# Patient Record
Sex: MALE | Race: WHITE | NOT HISPANIC OR LATINO | Employment: OTHER | URBAN - METROPOLITAN AREA
[De-identification: names, ages, dates, MRNs, and addresses within clinical notes are randomized per-mention and may not be internally consistent; named-entity substitution may affect disease eponyms.]

---

## 2020-06-11 ENCOUNTER — TRANSCRIBE ORDERS (OUTPATIENT)
Dept: ADMINISTRATIVE | Facility: HOSPITAL | Age: 85
End: 2020-06-11

## 2020-06-11 DIAGNOSIS — R29.818 TRANSIENT PARALYSIS OF LIMB: ICD-10-CM

## 2020-06-11 DIAGNOSIS — R26.9 ABNORMALITY OF GAIT: Primary | ICD-10-CM

## 2020-06-25 ENCOUNTER — HOSPITAL ENCOUNTER (OUTPATIENT)
Dept: MRI IMAGING | Facility: CLINIC | Age: 85
Discharge: HOME | End: 2020-06-25
Payer: COMMERCIAL

## 2020-06-25 DIAGNOSIS — R29.818 TRANSIENT PARALYSIS OF LIMB: ICD-10-CM

## 2020-06-25 DIAGNOSIS — R26.9 ABNORMALITY OF GAIT: ICD-10-CM

## 2020-06-25 PROCEDURE — 72148 MRI LUMBAR SPINE W/O DYE: CPT

## 2021-03-04 ENCOUNTER — TELEPHONE (OUTPATIENT)
Dept: UROLOGY | Facility: AMBULATORY SURGERY CENTER | Age: 86
End: 2021-03-04

## 2021-03-04 NOTE — TELEPHONE ENCOUNTER
Reason for appointment/Complaint/Diagnosis : gross hematuria      Insurance:[employer] medicare    History of Cancer? no         If yes, what kind?n/a    Previous urologist?  no              Records requested/where? Care everywhere  Outside testing/where? [facility]  Location Preference for office visit?  [place]

## 2021-03-04 NOTE — TELEPHONE ENCOUNTER
Call placed to patient, spoke with wife (patient in background on speaker phone)  Patient had one instance of light blood in urine  This subsided  UA was recently done by PCP and showed no evidence of hematuria  Patient has no urinary complaints at this time  Wife states that PCP did order CT scan as well  They declined to schedule at this time  They state that they will follow up with PCP first and if there is any reason to see urologist, they will call back to schedule

## 2021-03-11 NOTE — TELEPHONE ENCOUNTER
Florencio Schmitt from PCP Dr Zak Galvan office called in stating patient changed mind to schedule with a urologist  Florencio Schmitt stated she faxed over records to central scanning   Patient can be reached at [phone]

## 2021-03-11 NOTE — TELEPHONE ENCOUNTER
Patient scheduled for 3/24/21 at 230pm with [facility]QUE[facility]  Please confirm

## 2021-03-24 ENCOUNTER — CONSULT (OUTPATIENT)
Dept: UROLOGY | Facility: CLINIC | Age: 86
End: 2021-03-24
Payer: COMMERCIAL

## 2021-03-24 VITALS
HEIGHT: 72 IN | HEART RATE: 84 BPM | WEIGHT: 200 LBS | BODY MASS INDEX: 27.09 KG/M2 | SYSTOLIC BLOOD PRESSURE: 108 MMHG | DIASTOLIC BLOOD PRESSURE: 62 MMHG

## 2021-03-24 DIAGNOSIS — R31.0 GROSS HEMATURIA: Primary | ICD-10-CM

## 2021-03-24 DIAGNOSIS — N40.1 BENIGN PROSTATIC HYPERPLASIA WITH URINARY FREQUENCY: ICD-10-CM

## 2021-03-24 DIAGNOSIS — Z12.5 PROSTATE CANCER SCREENING: ICD-10-CM

## 2021-03-24 DIAGNOSIS — R35.0 BENIGN PROSTATIC HYPERPLASIA WITH URINARY FREQUENCY: ICD-10-CM

## 2021-03-24 LAB
SL AMB  POCT GLUCOSE, UA: NORMAL
SL AMB LEUKOCYTE ESTERASE,UA: NORMAL
SL AMB POCT BILIRUBIN,UA: NORMAL
SL AMB POCT BLOOD,UA: NORMAL
SL AMB POCT CLARITY,UA: CLEAR
SL AMB POCT COLOR,UA: YELLOW
SL AMB POCT KETONES,UA: NORMAL
SL AMB POCT NITRITE,UA: NORMAL
SL AMB POCT PH,UA: 5
SL AMB POCT SPECIFIC GRAVITY,UA: 1.03
SL AMB POCT URINE PROTEIN: NORMAL
SL AMB POCT UROBILINOGEN: NORMAL

## 2021-03-24 PROCEDURE — 88112 CYTOPATH CELL ENHANCE TECH: CPT | Performed by: PATHOLOGY

## 2021-03-24 PROCEDURE — 99204 OFFICE O/P NEW MOD 45 MIN: CPT | Performed by: PHYSICIAN ASSISTANT

## 2021-03-24 PROCEDURE — 87086 URINE CULTURE/COLONY COUNT: CPT | Performed by: PHYSICIAN ASSISTANT

## 2021-03-24 PROCEDURE — 81002 URINALYSIS NONAUTO W/O SCOPE: CPT | Performed by: PHYSICIAN ASSISTANT

## 2021-03-24 RX ORDER — AMOXICILLIN 500 MG/1
CAPSULE ORAL
COMMUNITY
Start: 2021-03-01 | End: 2021-08-06 | Stop reason: HOSPADM

## 2021-03-24 RX ORDER — LOSARTAN POTASSIUM 25 MG/1
25 TABLET ORAL DAILY
COMMUNITY

## 2021-03-24 RX ORDER — TERAZOSIN 10 MG/1
CAPSULE ORAL
COMMUNITY
Start: 2021-03-18 | End: 2021-08-06 | Stop reason: HOSPADM

## 2021-03-24 RX ORDER — PRAMIPEXOLE DIHYDROCHLORIDE 0.25 MG/1
0.25 TABLET ORAL 3 TIMES DAILY
COMMUNITY
End: 2021-07-21

## 2021-03-24 RX ORDER — TAMSULOSIN HYDROCHLORIDE 0.4 MG/1
0.4 CAPSULE ORAL
COMMUNITY
End: 2021-08-06 | Stop reason: HOSPADM

## 2021-03-24 RX ORDER — NAPROXEN 500 MG/1
500 TABLET ORAL 2 TIMES DAILY WITH MEALS
COMMUNITY

## 2021-03-24 NOTE — PROGRESS NOTES
3/24/2021      Chief Complaint   Patient presents with   Valdene Kimmy Hematuria       Assessment and Plan    82 y o  male new patient    1  Gross hematuria  - CT abdomen and pelvis with IV contrast from 03/09/2021 reveals bladder lithiasis with multiple large bladder stones, largest measuring 2 cm  Prostatomegaly was also noted  - Would recommend cystoscopy and TRUS evaluation  Consider surgical options such as Urolift versus TURP and open bladder stone removal   - Continue terazosin  - Last PSA 4 4 from 7/2020  Will obtain repeat PSA  DC at next office visit  - Urine sent out for cytology and culture  - F/u for cystoscopy and TRUS    History of Present Illness  Buffy Almaraz is a 82 y o  male here for evaluation of gross hematuria x 3 weeks ago  Reports this occurred 1 time, described urine as bright red  He has not had episode of hematuria since this occurrence  Denies any episodes of gross hematuria in the past    Associated symptoms include frequency, urgency, nocturia, weak stream, dribbling, and urge incontinence  Denies any flank pain  Past medical history significant for Parkinson's disease  Reports history of kidney infections and never required hospitalization  Denies history of kidney stones or other urologic issues  Denies personal or family history of cancer  Denies blood thinner use  Patient has been taking terazosin with improvements in nocturia  CT reveals bladder lithiasis with multiple large bladder stones, largest measuring 2 cm, as well as prostatomegaly  PSA from 07/2020 was 4 4  Patient denies any history of smoking  Reports only occasional alcohol use  Review of Systems   Constitutional: Negative for chills and fever  Respiratory: Negative for shortness of breath  Cardiovascular: Negative for chest pain  Gastrointestinal: Negative for abdominal pain, nausea and vomiting  Genitourinary: Positive for frequency, hematuria and urgency   Negative for difficulty urinating, dysuria and flank pain  Allergic/Immunologic: Negative for immunocompromised state  Neurological: Negative for dizziness  Hematological: Does not bruise/bleed easily  Past Medical History  History reviewed  No pertinent past medical history  Past Social History  History reviewed  No pertinent surgical history  Social History     Tobacco Use   Smoking Status Never Smoker   Smokeless Tobacco Never Used       Past Family History  History reviewed  No pertinent family history      Past Social history  Social History     Socioeconomic History    Marital status: /Civil Union     Spouse name: Not on file    Number of children: Not on file    Years of education: Not on file    Highest education level: Not on file   Occupational History    Not on file   Social Needs    Financial resource strain: Not on file    Food insecurity     Worry: Not on file     Inability: Not on file   [employer] needs     Medical: Not on file     Non-medical: Not on file   Tobacco Use    Smoking status: Never Smoker    Smokeless tobacco: Never Used   Substance and Sexual Activity    Alcohol use: Yes     Frequency: Monthly or less    Drug use: Never    Sexual activity: Not on file   Lifestyle    Physical activity     Days per week: Not on file     Minutes per session: Not on file    Stress: Not on file   Relationships    Social connections     Talks on phone: Not on file     Gets together: Not on file     Attends Rastafari service: Not on file     Active member of club or organization: Not on file     Attends meetings of clubs or organizations: Not on file     Relationship status: Not on file    Intimate partner violence     Fear of current or ex partner: Not on file     Emotionally abused: Not on file     Physically abused: Not on file     Forced sexual activity: Not on file   Other Topics Concern    Not on file   Social History Narrative    Not on file       Current Medications  Current Outpatient Medications   Medication Sig Dispense Refill    losartan (COZAAR) 25 mg tablet Take 25 mg by mouth daily      naproxen (NAPROSYN) 500 mg tablet Take 500 mg by mouth 2 (two) times a day with meals      pramipexole (MIRAPEX) 0 25 mg tablet Take 0 25 mg by mouth 3 (three) times a day      tamsulosin (Flomax) 0 4 mg Take 0 4 mg by mouth daily with dinner      terazosin (HYTRIN) 10 MG capsule       amoxicillin (AMOXIL) 500 mg capsule TAKE 1 CAPSULE BY MOUTH THREE TIMES A DAY FOR 10 DAYS       No current facility-administered medications for this visit  Allergies  No Known Allergies      The following portions of the patient's history were reviewed and updated as appropriate: allergies, current medications, past medical history, past social history, past surgical history and problem list       Vitals  Vitals:    03/24/21 1417   BP: 108/62   BP Location: Left arm   Patient Position: Sitting   Cuff Size: Standard   Pulse: 84   Weight: 90 7 kg (200 lb)   Height: 6' (1 829 m)           Physical Exam  Physical Exam  Constitutional:       Appearance: Normal appearance  He is normal weight  HENT:      Head: Normocephalic and atraumatic  Eyes:      General: No scleral icterus  Conjunctiva/sclera: Conjunctivae normal    Neck:      Musculoskeletal: Normal range of motion  Pulmonary:      Effort: Pulmonary effort is normal    Abdominal:      General: Abdomen is flat  There is no distension  Palpations: Abdomen is soft  There is no mass  Tenderness: There is no abdominal tenderness  There is no right CVA tenderness or left CVA tenderness  Musculoskeletal: Normal range of motion  Neurological:      General: No focal deficit present  Mental Status: He is alert and oriented to person, place, and time  Psychiatric:         Mood and Affect: Mood normal          Behavior: Behavior normal          Thought Content:  Thought content normal          Judgment: Judgment normal            Results  Recent Results (from the past 1 hour(s))   POCT urine dip    Collection Time: 03/24/21  2:18 PM   Result Value Ref Range    LEUKOCYTE ESTERASE,UA -     NITRITE,UA -     SL AMB POCT UROBILINOGEN -     POCT URINE PROTEIN ++      PH,UA 5 0     BLOOD,UA +++     SPECIFIC GRAVITY,UA 1 030     KETONES,UA -     BILIRUBIN,UA -     GLUCOSE, UA -      COLOR,UA yellow     CLARITY,UA clear    ]  No results found for: PSA  No results found for: GLUCOSE, CALCIUM, NA, K, CO2, CL, BUN, CREATININE  No results found for: WBC, HGB, HCT, MCV, PLT        Orders  Orders Placed This Encounter   Procedures    PSA, Total Screen     This is a patient instruction: This test is non-fasting  Please drink two glasses of water morning of bloodwork          Standing Status:   Future     Standing Expiration Date:   3/24/2022    POCT urine dip       Ayaz Horvath PA-C

## 2021-03-25 LAB — BACTERIA UR CULT: NORMAL

## 2021-04-09 ENCOUNTER — TELEPHONE (OUTPATIENT)
Dept: NEUROLOGY | Facility: CLINIC | Age: 86
End: 2021-04-09

## 2021-04-09 NOTE — TELEPHONE ENCOUNTER
LMOM to c/b to sched consult w Dr Aparna Bruner  Need to find next avail in [facility]  Nothing in [place]      appt notes would be:    Kimmie Dhillon ([facility] PCP) / JUAN FRANCISCO / Nic Hong

## 2021-04-09 NOTE — TELEPHONE ENCOUNTER
Best contact number for patient:        Confirmed    Emergency Contact name and number:    Referring provider and telephone number:    Primary Care Provider Name and if affiliated with   [facility]:        PCP Quita Curran     Reason for Appointment/Dx:      PD  Have you seen and followed up with a pediatric Neurologist for this disease in the past?         NO   (If yes ok to schedule with Dr Roseline Adkins)    Neurology Location patient would like to be seen:     [place] ONLY    Order received? Yes                                                Records Received? Not sure    Have you ever seen another Neurologist?         Not sure    Insurance Information    Insurance Name:  [employer] [facility]    ID/Policy #:    Secondary Insurance:    ID/Policy#: Workman's Comp/ Accident/ School  Information      Workman's Comp/Accident/School related?              NO    If yes name of Insurance company:    Date of Injury:    Type of Injury:    [address] Name and Telephone Number:    Notes:                   Appointment date:     Tuesday 7/13/21  10am  Stanley HALLMAN    Sent NP - PD packet    Put on [facility] for   [facility]

## 2021-05-25 PROBLEM — R31.0 GROSS HEMATURIA: Status: ACTIVE | Noted: 2021-05-25

## 2021-05-25 PROBLEM — N21.0 BLADDER STONES: Status: ACTIVE | Noted: 2021-05-25

## 2021-05-25 PROBLEM — Z71.2 ENCOUNTER TO DISCUSS TEST RESULTS: Status: ACTIVE | Noted: 2021-05-25

## 2021-05-25 PROBLEM — N40.1 ENLARGED PROSTATE WITH LOWER URINARY TRACT SYMPTOMS (LUTS): Status: ACTIVE | Noted: 2021-05-25

## 2021-05-25 NOTE — PROGRESS NOTES
Problem List Items Addressed This Visit        Genitourinary    Bladder stones - Primary    Relevant Orders    POCT urine dip (Completed)    Case request operating room: CYSTOLITHOTOMY OPEN, TRANSURETHRAL RESECTION OF PROSTATE (TURP) (Completed)    Gross hematuria    Enlarged prostate with lower urinary tract symptoms (LUTS)    Relevant Orders    Case request operating room: CYSTOLITHOTOMY OPEN, TRANSURETHRAL RESECTION OF PROSTATE (TURP) (Completed)       Other    Encounter to discuss test results          Discussion:    Raven Kelly and I had a productive consultation today  His prostate measures 68 82 grams  He has over 6 bladder stones some of which are larger than 3 centimeters  I spoke with him about the proposed surgery of transurethral resection of prostate with concomitant open cystolithotomy given at laser destruction of such a large stone burden is not prudent  He does understand the risk of bladder neck contracture, urethral stricture, need for Aviles catheter drainage of the bladder for 2 weeks after open bladder surgery, and the risks of infection, bleeding, pain, damage to surrounding structures, need for additional procedures, risk of failure of the procedure, risk of anesthesia, risk of positioning complications including neurapraxia, chronic pain, and paralysis as well as risk of rhabdomyolysis and compartment syndrome  Risk of deep venous thrombosis and venous thromboembolism as well as pneumonia and other potential unpredictable complications were also discussed with the patient       He has an excellent performance status, no history of heart attack or stroke, he is able to walk a number of blocks and climb stairs without troubles  We will proceed to transurethral resection of prostate, likely to be performed 1st, followed by open bladder stone removal       He does provide verbal informed consent, he will sign a written copy of informed consent on the day of surgery  Assessment and plan:       Please see problem oriented charting for the assessment plan of today's urological complaints    Mary Beach MD      Chief Complaint     Chief Complaint   Patient presents with    Cystoscopy     TRUS    Benign Prostatic Hypertrophy    Gross Hematuria    Bladder Stones         History of Present Illness     Juanjose Nelson is a 82 y o  Gentleman with gross hematuria and difficulty urinating, this is likely due to his large prostate and multiple bladder stones  Cystoscopy today does confirm bladder outlet obstruction and multiple bladder stones, transrectal ultrasound shows a 68 82 gram gland, please see separate procedure note  He is very motivated to undergo surgery, he would also like to  Eventually discontinue his medical therapies  No new urologic complaints  The following portions of the patient's history were reviewed and updated as appropriate: allergies, current medications, past family history, past medical history, past social history, past surgical history and problem list     Detailed Urologic History     - please refer to HPI    Review of Systems     Review of Systems   Constitutional: Negative  HENT: Negative  Eyes: Negative  Respiratory: Negative  Cardiovascular: Negative  Gastrointestinal: Negative  Endocrine: Negative  Genitourinary: Positive for difficulty urinating and hematuria  Musculoskeletal: Negative  Skin: Negative  Allergic/Immunologic: Negative  Neurological: Negative  Hematological: Negative  Psychiatric/Behavioral: Negative  Allergies     No Known Allergies    Physical Exam     Physical Exam  Vitals signs reviewed  Constitutional:       General: He is in acute distress  Appearance: Normal appearance  He is ill-appearing, toxic-appearing and diaphoretic  HENT:      Head: Normocephalic and atraumatic  Eyes:      General: No scleral icterus  Right eye: No discharge  Left eye: No discharge  Cardiovascular:      Pulses: Normal pulses  Pulmonary:      Effort: Pulmonary effort is normal    Abdominal:      General: There is no distension  Palpations: There is no mass  Tenderness: There is no abdominal tenderness  Hernia: No hernia is present  Genitourinary:     Penis: Normal     Musculoskeletal:         General: No swelling or tenderness  Skin:     General: Skin is warm  Neurological:      General: No focal deficit present  Mental Status: He is alert and oriented to person, place, and time  Cranial Nerves: No cranial nerve deficit  Sensory: No sensory deficit  Motor: No weakness  Coordination: Coordination normal    Psychiatric:         Mood and Affect: Mood normal          Behavior: Behavior normal          Thought Content: Thought content normal          Judgment: Judgment normal              Vital Signs  Vitals:    05/27/21 1042   BP: 118/62   Pulse: 70   Weight: 88 1 kg (194 lb 3 2 oz)   Height: 6' (1 829 m)         Current Medications       Current Outpatient Medications:     losartan (COZAAR) 25 mg tablet, Take 25 mg by mouth daily, Disp: , Rfl:     naproxen (NAPROSYN) 500 mg tablet, Take 500 mg by mouth 2 (two) times a day with meals, Disp: , Rfl:     pramipexole (MIRAPEX) 0 25 mg tablet, Take 0 25 mg by mouth 3 (three) times a day, Disp: , Rfl:     tamsulosin (Flomax) 0 4 mg, Take 0 4 mg by mouth daily with dinner, Disp: , Rfl:     terazosin (HYTRIN) 10 MG capsule, , Disp: , Rfl:     amoxicillin (AMOXIL) 500 mg capsule, TAKE 1 CAPSULE BY MOUTH THREE TIMES A DAY FOR 10 DAYS, Disp: , Rfl:       Active Problems     Patient Active Problem List   Diagnosis    Bladder stones    Gross hematuria    Enlarged prostate with lower urinary tract symptoms (LUTS)    Encounter to discuss test results         Past Medical History     History reviewed  No pertinent past medical history        Surgical History     History reviewed  No pertinent surgical history  Family History     History reviewed  No pertinent family history        Social History     Social History     Social History     Tobacco Use   Smoking Status Never Smoker   Smokeless Tobacco Never Used         Pertinent Lab Values     No results found for: CREATININE    No results found for: PSA          Pertinent Imaging        Real-time review of transrectal ultrasound

## 2021-05-25 NOTE — PATIENT INSTRUCTIONS

## 2021-05-27 ENCOUNTER — PROCEDURE VISIT (OUTPATIENT)
Dept: UROLOGY | Facility: CLINIC | Age: 86
End: 2021-05-27
Payer: COMMERCIAL

## 2021-05-27 VITALS
WEIGHT: 194.2 LBS | HEIGHT: 72 IN | DIASTOLIC BLOOD PRESSURE: 62 MMHG | HEART RATE: 70 BPM | SYSTOLIC BLOOD PRESSURE: 118 MMHG | BODY MASS INDEX: 26.3 KG/M2

## 2021-05-27 DIAGNOSIS — N40.1 BENIGN PROSTATIC HYPERPLASIA WITH URINARY HESITANCY: ICD-10-CM

## 2021-05-27 DIAGNOSIS — N21.0 BLADDER STONES: Primary | ICD-10-CM

## 2021-05-27 DIAGNOSIS — R39.11 BENIGN PROSTATIC HYPERPLASIA WITH URINARY HESITANCY: ICD-10-CM

## 2021-05-27 DIAGNOSIS — Z71.2 ENCOUNTER TO DISCUSS TEST RESULTS: ICD-10-CM

## 2021-05-27 DIAGNOSIS — R31.0 GROSS HEMATURIA: ICD-10-CM

## 2021-05-27 LAB
SL AMB  POCT GLUCOSE, UA: NORMAL
SL AMB LEUKOCYTE ESTERASE,UA: NORMAL
SL AMB POCT BILIRUBIN,UA: NORMAL
SL AMB POCT BLOOD,UA: NORMAL
SL AMB POCT CLARITY,UA: CLEAR
SL AMB POCT COLOR,UA: YELLOW
SL AMB POCT KETONES,UA: NORMAL
SL AMB POCT NITRITE,UA: NORMAL
SL AMB POCT PH,UA: 6.5
SL AMB POCT SPECIFIC GRAVITY,UA: 1.01
SL AMB POCT URINE PROTEIN: NORMAL
SL AMB POCT UROBILINOGEN: 0.2

## 2021-05-27 PROCEDURE — 99214 OFFICE O/P EST MOD 30 MIN: CPT | Performed by: UROLOGY

## 2021-05-27 PROCEDURE — 87186 SC STD MICRODIL/AGAR DIL: CPT | Performed by: UROLOGY

## 2021-05-27 PROCEDURE — 87086 URINE CULTURE/COLONY COUNT: CPT | Performed by: UROLOGY

## 2021-05-27 PROCEDURE — 76872 US TRANSRECTAL: CPT | Performed by: UROLOGY

## 2021-05-27 PROCEDURE — 52000 CYSTOURETHROSCOPY: CPT | Performed by: UROLOGY

## 2021-05-27 PROCEDURE — 81002 URINALYSIS NONAUTO W/O SCOPE: CPT | Performed by: UROLOGY

## 2021-05-27 RX ORDER — GABAPENTIN 100 MG/1
300 CAPSULE ORAL ONCE
Status: CANCELLED | OUTPATIENT
Start: 2021-05-27 | End: 2021-05-27

## 2021-05-27 RX ORDER — ACETAMINOPHEN 325 MG/1
975 TABLET ORAL ONCE
Status: CANCELLED | OUTPATIENT
Start: 2021-05-27 | End: 2021-05-27

## 2021-05-27 NOTE — LETTER
May 27, 2021     Hiram Lawson, DO  [address] [facility] [address]    Patient: Claudell Like   YOB: 1935   Date of Visit: 5/27/2021       Dear Dr Giselle Cordero: Thank you for referring Claudell Like to me for evaluation  Below are my notes for this consultation  If you have questions, please do not hesitate to call me  I look forward to following your patient along with you  Sincerely,        Fer Gold MD        CC: MARCUS Fritz MD  5/27/2021 11:11 AM  Sign when Signing Visit  Office TRUS    Indication    Lower urinary tract symptoms, gross hematuria    Informed consent   The risks, benefits and alternatives to TRUS  Discussed with patient, informed consent obtained      Transrectal ultrasonography  The patient was placed in the left lateral decubitus position  After an attentive digital rectal examination, a 7 5 mHz sidefire ultrasound probe was gently inserted into the rectum and biplanar imaging of the prostate was done with the findings noted below  Images were taken of any abnormal findings and also to document prostate size  Bladder  The bladder base appeared normal     Prostate      Ultrasound size measurements:  -Volume:   68 8  cm3    Ultrasound findings:  -Cysts: None  -Masses: None  -Median lobe:  none    Complications  There were no procedural complications  Disposition  The patient was dismissed to home     Post-procedure instructions: Today he underwent an uncomplicated transrectal ultrasound  Showing him to be a candidate for TURP and open cystolithotomy          Biopsy prostate     Date/Time 5/27/2021 11:11 AM     Performed by  Fer Gold MD     Authorized by Fer Gold MD      Universal Protocol   Consent: Verbal consent obtained  Written consent obtained    Risks and benefits: risks, benefits and alternatives were discussed  Consent given by: patient  Patient understanding: patient states understanding of the procedure being performed  Patient consent: the patient's understanding of the procedure matches consent given  Procedure consent: procedure consent matches procedure scheduled  Relevant documents: relevant documents present and verified  Test results: test results available and properly labeled  Site marked: the operative site was not marked  Radiology Images displayed and confirmed  If images not available, report reviewed: imaging studies available  Required items: required blood products, implants, devices, and special equipment available  Patient identity confirmed: verbally with patient and provided demographic data        Local anesthesia used: no     Anesthesia   Local anesthesia used: no     Sedation   Patient sedated: no        Specimen: no    Culture: no   Procedure Details   Procedure Notes: Office TRUS    Indication    Lower urinary tract symptoms, gross hematuria    Informed consent   The risks, benefits and alternatives to TRUS  Discussed with patient, informed consent obtained      Transrectal ultrasonography  The patient was placed in the left lateral decubitus position  After an attentive digital rectal examination, a 7 5 mHz sidefire ultrasound probe was gently inserted into the rectum and biplanar imaging of the prostate was done with the findings noted below  Images were taken of any abnormal findings and also to document prostate size  Bladder  The bladder base appeared normal     Prostate      Ultrasound size measurements:  -Volume:   68 8  cm3    Ultrasound findings:  -Cysts: None  -Masses: None  -Median lobe:  none    Complications  There were no procedural complications  Disposition  The patient was dismissed to home     Post-procedure instructions:      Today he underwent an uncomplicated transrectal ultrasound  Showing him to be a candidate for TURP and open cystolithotomy  Patient Transportation: confirmed  Patient tolerance: patient tolerated the procedure well with no immediate complications             Magno Bartlett MD  5/27/2021 11:09 AM  Sign when Signing Visit  Office Cystoscopy Procedure Note    Indication:    Hematuria    Informed consent   The risks, benefits, complications, treatment options, and expected outcomes were discussed with the patient  The patient concurred with the proposed plan and provided informed consent  Anesthesia  Lidocaine jelly 2%    Antibiotic prophylaxis   None    Procedure  The patient was placed in the supineposition, was prepped and draped in the usual manner using sterile technique, and 2% lidocaine jelly instilled into the urethra  A 17 F flexible cystoscope was then inserted into the urethra and the urethra and bladder carefully examined  The following findings were noted:    Findings:  Urethra:   no stricture, intact sphincter  Prostate:   lateral lobe hypertrophy, multiple bladder stones noted  Bladder:   trabeculated bladder, stones present, no urothelial lesions  Ureteral orifices:   orthotopic  Other findings:   none, retroflexed view confirms    Specimens: None                 Complications:    None; patient tolerated the procedure well           Disposition: To home           Condition: Stable    Plan:  proceed to transurethral resection of prostate with open cystolithotomy         Cystoscopy     Date/Time 5/27/2021 11:09 AM     Performed by  Magno Bartlett MD     Authorized by Magno Bartlett MD      Universal Protocol:  Consent: Verbal consent obtained  Written consent obtained    Risks and benefits: risks, benefits and alternatives were discussed  Consent given by: patient  Patient understanding: patient states understanding of the procedure being performed  Patient consent: the patient's understanding of the procedure matches consent given  Procedure consent: procedure consent matches procedure scheduled  Relevant documents: relevant documents present and verified  Test results: test results available and properly labeled  Site marked: the operative site was not marked  Radiology Images displayed and confirmed  If images not available, report reviewed: imaging studies available  Required items: required blood products, implants, devices, and special equipment available  Patient identity confirmed: verbally with patient and provided demographic data        Procedure Details:  Procedure type: cystoscopy    Patient tolerance: Patient tolerated the procedure well with no immediate complications    Additional Procedure Details: Office Cystoscopy Procedure Note    Indication:    Hematuria    Informed consent   The risks, benefits, complications, treatment options, and expected outcomes were discussed with the patient  The patient concurred with the proposed plan and provided informed consent  Anesthesia  Lidocaine jelly 2%    Antibiotic prophylaxis   None    Procedure  The patient was placed in the supineposition, was prepped and draped in the usual manner using sterile technique, and 2% lidocaine jelly instilled into the urethra  A 17 F flexible cystoscope was then inserted into the urethra and the urethra and bladder carefully examined    The following findings were noted:    Findings:  Urethra:   no stricture, intact sphincter  Prostate:   lateral lobe hypertrophy, multiple bladder stones noted  Bladder:   trabeculated bladder, stones present, no urothelial lesions  Ureteral orifices:   orthotopic  Other findings:   none, retroflexed view confirms    Specimens: None                 Complications:    None; patient tolerated the procedure well           Disposition: To home           Condition: Stable    Plan:  proceed to transurethral resection of prostate with open cystolithotomy              Sasha Flannery MD  5/27/2021 11:08 AM  Sign when Signing Visit       Problem List Items Addressed This Visit        Genitourinary    Bladder stones - Primary    Relevant Orders    POCT urine dip (Completed)    Case request operating room: CYSTOLITHOTOMY OPEN, TRANSURETHRAL RESECTION OF PROSTATE (TURP) (Completed)    Gross hematuria    Enlarged prostate with lower urinary tract symptoms (LUTS)    Relevant Orders    Case request operating room: CYSTOLITHOTOMY OPEN, TRANSURETHRAL RESECTION OF PROSTATE (TURP) (Completed)       Other    Encounter to discuss test results          Discussion:    Timbo Frazier and I had a productive consultation today  His prostate measures 68 82 grams  He has over 6 bladder stones some of which are larger than 3 centimeters  I spoke with him about the proposed surgery of transurethral resection of prostate with concomitant open cystolithotomy given at laser destruction of such a large stone burden is not prudent  He does understand the risk of bladder neck contracture, urethral stricture, need for Aviles catheter drainage of the bladder for 2 weeks after open bladder surgery, and the risks of infection, bleeding, pain, damage to surrounding structures, need for additional procedures, risk of failure of the procedure, risk of anesthesia, risk of positioning complications including neurapraxia, chronic pain, and paralysis as well as risk of rhabdomyolysis and compartment syndrome  Risk of deep venous thrombosis and venous thromboembolism as well as pneumonia and other potential unpredictable complications were also discussed with the patient       He has an excellent performance status, no history of heart attack or stroke, he is able to walk a number of blocks and climb stairs without troubles  We will proceed to transurethral resection of prostate, likely to be performed 1st, followed by open bladder stone removal       He does provide verbal informed consent, he will sign a written copy of informed consent on the day of surgery              Assessment and plan:       Please see problem oriented charting for the assessment plan of today's urological complaints    Ray Frausto MD      Chief Complaint Chief Complaint   Patient presents with    Cystoscopy     TRUS    Benign Prostatic Hypertrophy    Gross Hematuria    Bladder Stones         History of Present Illness     Shelton Gonzalez is a 82 y o  Gentleman with gross hematuria and difficulty urinating, this is likely due to his large prostate and multiple bladder stones  Cystoscopy today does confirm bladder outlet obstruction and multiple bladder stones, transrectal ultrasound shows a 68 82 gram gland, please see separate procedure note  He is very motivated to undergo surgery, he would also like to  Eventually discontinue his medical therapies  No new urologic complaints  The following portions of the patient's history were reviewed and updated as appropriate: allergies, current medications, past family history, past medical history, past social history, past surgical history and problem list     Detailed Urologic History     - please refer to HPI    Review of Systems     Review of Systems   Constitutional: Negative  HENT: Negative  Eyes: Negative  Respiratory: Negative  Cardiovascular: Negative  Gastrointestinal: Negative  Endocrine: Negative  Genitourinary: Positive for difficulty urinating and hematuria  Musculoskeletal: Negative  Skin: Negative  Allergic/Immunologic: Negative  Neurological: Negative  Hematological: Negative  Psychiatric/Behavioral: Negative  Allergies     No Known Allergies    Physical Exam     Physical Exam  Vitals signs reviewed  Constitutional:       General: He is in acute distress  Appearance: Normal appearance  He is ill-appearing, toxic-appearing and diaphoretic  HENT:      Head: Normocephalic and atraumatic  Eyes:      General: No scleral icterus  Right eye: No discharge  Left eye: No discharge  Cardiovascular:      Pulses: Normal pulses     Pulmonary:      Effort: Pulmonary effort is normal    Abdominal:      General: There is no distension  Palpations: There is no mass  Tenderness: There is no abdominal tenderness  Hernia: No hernia is present  Genitourinary:     Penis: Normal     Musculoskeletal:         General: No swelling or tenderness  Skin:     General: Skin is warm  Neurological:      General: No focal deficit present  Mental Status: He is alert and oriented to person, place, and time  Cranial Nerves: No cranial nerve deficit  Sensory: No sensory deficit  Motor: No weakness  Coordination: Coordination normal    Psychiatric:         Mood and Affect: Mood normal          Behavior: Behavior normal          Thought Content: Thought content normal          Judgment: Judgment normal              Vital Signs  Vitals:    05/27/21 1042   BP: 118/62   Pulse: 70   Weight: 88 1 kg (194 lb 3 2 oz)   Height: 6' (1 829 m)         Current Medications       Current Outpatient Medications:     losartan (COZAAR) 25 mg tablet, Take 25 mg by mouth daily, Disp: , Rfl:     naproxen (NAPROSYN) 500 mg tablet, Take 500 mg by mouth 2 (two) times a day with meals, Disp: , Rfl:     pramipexole (MIRAPEX) 0 25 mg tablet, Take 0 25 mg by mouth 3 (three) times a day, Disp: , Rfl:     tamsulosin (Flomax) 0 4 mg, Take 0 4 mg by mouth daily with dinner, Disp: , Rfl:     terazosin (HYTRIN) 10 MG capsule, , Disp: , Rfl:     amoxicillin (AMOXIL) 500 mg capsule, TAKE 1 CAPSULE BY MOUTH THREE TIMES A DAY FOR 10 DAYS, Disp: , Rfl:       Active Problems     Patient Active Problem List   Diagnosis    Bladder stones    Gross hematuria    Enlarged prostate with lower urinary tract symptoms (LUTS)    Encounter to discuss test results         Past Medical History     History reviewed  No pertinent past medical history  Surgical History     History reviewed  No pertinent surgical history  Family History     History reviewed  No pertinent family history        Social History     Social History     Social History Tobacco Use   Smoking Status Never Smoker   Smokeless Tobacco Never Used         Pertinent Lab Values     No results found for: CREATININE    No results found for: PSA          Pertinent Imaging        Real-time review of transrectal ultrasound

## 2021-05-27 NOTE — PROGRESS NOTES
Office TRUS    Indication    Lower urinary tract symptoms, gross hematuria    Informed consent   The risks, benefits and alternatives to TRUS  Discussed with patient, informed consent obtained      Transrectal ultrasonography  The patient was placed in the left lateral decubitus position  After an attentive digital rectal examination, a 7 5 mHz sidefire ultrasound probe was gently inserted into the rectum and biplanar imaging of the prostate was done with the findings noted below  Images were taken of any abnormal findings and also to document prostate size  Bladder  The bladder base appeared normal     Prostate      Ultrasound size measurements:  -Volume:   68 8  cm3    Ultrasound findings:  -Cysts: None  -Masses: None  -Median lobe:  none    Complications  There were no procedural complications  Disposition  The patient was dismissed to home     Post-procedure instructions: Today he underwent an uncomplicated transrectal ultrasound  Showing him to be a candidate for TURP and open cystolithotomy          Biopsy prostate     Date/Time 5/27/2021 11:11 AM     Performed by  Franci Toussaint MD     Authorized by Franci Toussaint MD      [place] Protocol   Consent: Verbal consent obtained  Written consent obtained  Risks and benefits: risks, benefits and alternatives were discussed  Consent given by: patient  Patient understanding: patient states understanding of the procedure being performed  Patient consent: the patient's understanding of the procedure matches consent given  Procedure consent: procedure consent matches procedure scheduled  Relevant documents: relevant documents present and verified  Test results: test results available and properly labeled  Site marked: the operative site was not marked  Radiology Images displayed and confirmed   If images not available, report reviewed: imaging studies available  Required items: required blood products, implants, devices, and special equipment available  Patient identity confirmed: verbally with patient and provided demographic data        Local anesthesia used: no     Anesthesia   Local anesthesia used: no     Sedation   Patient sedated: no        Specimen: no    Culture: no   Procedure Details   Procedure Notes: Office TRUS    Indication    Lower urinary tract symptoms, gross hematuria    Informed consent   The risks, benefits and alternatives to TRUS  Discussed with patient, informed consent obtained      Transrectal ultrasonography  The patient was placed in the left lateral decubitus position  After an attentive digital rectal examination, a 7 5 mHz sidefire ultrasound probe was gently inserted into the rectum and biplanar imaging of the prostate was done with the findings noted below  Images were taken of any abnormal findings and also to document prostate size  Bladder  The bladder base appeared normal     Prostate      Ultrasound size measurements:  -Volume:   68 8  cm3    Ultrasound findings:  -Cysts: None  -Masses: None  -Median lobe:  none    Complications  There were no procedural complications  Disposition  The patient was dismissed to home     Post-procedure instructions: Today he underwent an uncomplicated transrectal ultrasound  Showing him to be a candidate for TURP and open cystolithotomy  Patient Transportation: confirmed  Patient tolerance: patient tolerated the procedure well with no immediate complications

## 2021-05-27 NOTE — PROGRESS NOTES
Office Cystoscopy Procedure Note    Indication:    Hematuria    Informed consent   The risks, benefits, complications, treatment options, and expected outcomes were discussed with the patient  The patient concurred with the proposed plan and provided informed consent  Anesthesia  Lidocaine jelly 2%    Antibiotic prophylaxis   None    Procedure  The patient was placed in the supineposition, was prepped and draped in the usual manner using sterile technique, and 2% lidocaine jelly instilled into the urethra  A 17 F flexible cystoscope was then inserted into the urethra and the urethra and bladder carefully examined  The following findings were noted:    Findings:  Urethra:   no stricture, intact sphincter  Prostate:   lateral lobe hypertrophy, multiple bladder stones noted  Bladder:   trabeculated bladder, stones present, no urothelial lesions  Ureteral orifices:   orthotopic  Other findings:   none, retroflexed view confirms    Specimens: None                 Complications:    None; patient tolerated the procedure well           Disposition: To home           Condition: Stable    Plan:  proceed to transurethral resection of prostate with open cystolithotomy         Cystoscopy     Date/Time 5/27/2021 11:09 AM     Performed by  Devon Corral MD     Authorized by Devon Corral MD      Universal Protocol:  Consent: Verbal consent obtained  Written consent obtained  Risks and benefits: risks, benefits and alternatives were discussed  Consent given by: patient  Patient understanding: patient states understanding of the procedure being performed  Patient consent: the patient's understanding of the procedure matches consent given  Procedure consent: procedure consent matches procedure scheduled  Relevant documents: relevant documents present and verified  Test results: test results available and properly labeled  Site marked: the operative site was not marked  Radiology Images displayed and confirmed   If images not available, report reviewed: imaging studies available  Required items: required blood products, implants, devices, and special equipment available  Patient identity confirmed: verbally with patient and provided demographic data        Procedure Details:  Procedure type: cystoscopy    Patient tolerance: Patient tolerated the procedure well with no immediate complications    Additional Procedure Details: Office Cystoscopy Procedure Note    Indication:    Hematuria    Informed consent   The risks, benefits, complications, treatment options, and expected outcomes were discussed with the patient  The patient concurred with the proposed plan and provided informed consent  Anesthesia  Lidocaine jelly 2%    Antibiotic prophylaxis   None    Procedure  The patient was placed in the supineposition, was prepped and draped in the usual manner using sterile technique, and 2% lidocaine jelly instilled into the urethra  A 17 F flexible cystoscope was then inserted into the urethra and the urethra and bladder carefully examined    The following findings were noted:    Findings:  Urethra:   no stricture, intact sphincter  Prostate:   lateral lobe hypertrophy, multiple bladder stones noted  Bladder:   trabeculated bladder, stones present, no urothelial lesions  Ureteral orifices:   orthotopic  Other findings:   none, retroflexed view confirms    Specimens: None                 Complications:    None; patient tolerated the procedure well           Disposition: To home           Condition: Stable    Plan:  proceed to transurethral resection of prostate with open cystolithotomy

## 2021-05-28 ENCOUNTER — TELEPHONE (OUTPATIENT)
Dept: UROLOGY | Facility: CLINIC | Age: 86
End: 2021-05-28

## 2021-05-28 ENCOUNTER — PREP FOR PROCEDURE (OUTPATIENT)
Dept: UROLOGY | Facility: CLINIC | Age: 86
End: 2021-05-28

## 2021-05-28 DIAGNOSIS — R39.11 BENIGN PROSTATIC HYPERPLASIA WITH URINARY HESITANCY: Primary | ICD-10-CM

## 2021-05-28 DIAGNOSIS — N40.1 BENIGN PROSTATIC HYPERPLASIA WITH URINARY HESITANCY: Primary | ICD-10-CM

## 2021-05-28 NOTE — TELEPHONE ENCOUNTER
I spoke w patient and he is sched 7/8 at the [facility]  He is aware he will stay for observation, needs a  and the hospital will contact him the day prior w time of arrival  He will go for PATs 6/17 and have advised 7 day hold of any aspirin products, multivitamins and fish oils  I will mail him a surgical packet w all of this info  He is aware to contact us w any questions or concerns

## 2021-06-01 LAB — BACTERIA UR CULT: ABNORMAL

## 2021-06-02 ENCOUNTER — TELEPHONE (OUTPATIENT)
Dept: UROLOGY | Facility: CLINIC | Age: 86
End: 2021-06-02

## 2021-06-02 DIAGNOSIS — R82.71 BACTERIURIA: Primary | ICD-10-CM

## 2021-06-02 RX ORDER — SULFAMETHOXAZOLE AND TRIMETHOPRIM 400; 80 MG/1; MG/1
1 TABLET ORAL EVERY 12 HOURS SCHEDULED
Qty: 10 TABLET | Refills: 0 | Status: SHIPPED | OUTPATIENT
Start: 2021-06-02 | End: 2021-06-07

## 2021-06-02 NOTE — TELEPHONE ENCOUNTER
Attempted to call patient at this time  No answer  Per communication consent left detailed message advising    Per Dr Ana Rosa Obregon he has sent a culture specific renally dosed bactrim in preparation for his surgery  Medication was sent to [facility]  It is a 5 day course   Left office number for call back

## 2021-06-03 NOTE — TELEPHONE ENCOUNTER
Call placed to patient, patient states that he did receive our message yesterday and has picked up the Bactrim

## 2021-06-11 NOTE — TELEPHONE ENCOUNTER
Spoke w patient and wife and he is RS for 8/5 and they are aware to go for PATs on July 16th and we have RS postop to 8/18  They are aware to contact me w any issues or concerns

## 2021-06-29 ENCOUNTER — TELEPHONE (OUTPATIENT)
Dept: NEUROLOGY | Facility: CLINIC | Age: 86
End: 2021-06-29

## 2021-07-07 ENCOUNTER — ANESTHESIA EVENT (OUTPATIENT)
Dept: PERIOP | Facility: HOSPITAL | Age: 86
End: 2021-07-07
Payer: COMMERCIAL

## 2021-07-13 ENCOUNTER — CONSULT (OUTPATIENT)
Dept: NEUROLOGY | Facility: CLINIC | Age: 86
End: 2021-07-13
Payer: COMMERCIAL

## 2021-07-13 VITALS
BODY MASS INDEX: 25.73 KG/M2 | HEART RATE: 68 BPM | WEIGHT: 190 LBS | DIASTOLIC BLOOD PRESSURE: 82 MMHG | SYSTOLIC BLOOD PRESSURE: 136 MMHG | HEIGHT: 72 IN

## 2021-07-13 DIAGNOSIS — G62.9 PERIPHERAL NEUROPATHY: ICD-10-CM

## 2021-07-13 DIAGNOSIS — G20 PARKINSON DISEASE (HCC): Primary | ICD-10-CM

## 2021-07-13 DIAGNOSIS — M48.061 SPINAL STENOSIS OF LUMBAR REGION WITHOUT NEUROGENIC CLAUDICATION: ICD-10-CM

## 2021-07-13 DIAGNOSIS — R20.2 PARESTHESIA: ICD-10-CM

## 2021-07-13 PROCEDURE — 99205 OFFICE O/P NEW HI 60 MIN: CPT | Performed by: PSYCHIATRY & NEUROLOGY

## 2021-07-13 RX ORDER — PRAMIPEXOLE DIHYDROCHLORIDE 0.5 MG/1
TABLET ORAL
COMMUNITY
Start: 2021-06-12 | End: 2021-07-21

## 2021-07-13 RX ORDER — RASAGILINE 1 MG/1
0.5 TABLET ORAL DAILY
Qty: 30 TABLET | Refills: 5 | Status: SHIPPED | OUTPATIENT
Start: 2021-07-13 | End: 2021-09-07 | Stop reason: SDUPTHER

## 2021-07-13 RX ORDER — RASAGILINE 0.5 MG/1
0.5 TABLET ORAL DAILY
Qty: 7 TABLET | Refills: 0 | Status: SHIPPED | OUTPATIENT
Start: 2021-07-13 | End: 2021-09-08

## 2021-07-13 NOTE — PROGRESS NOTES
Stacey Bernard is a 82 y o  male  Presents today with history of Parkinson's disease with secondary gait difficulty    Assessment:  1  Parkinson disease ([address] )    2  Peripheral neuropathy    3  Spinal stenosis of lumbar region without neurogenic claudication    4  Paresthesia        Plan:   initiate Azilect 0 5 mg titrating to 1 mg  Continue current dose of Mirapex   Blood work  Follow-up 6 weeks    Discussion:  Veronia Phalen has Parkinson's disease manifest predominantly as gait disturbance and bradykinesia  Unfortunately was not able to tolerate Sinemet due to GI upset and thus far has not been able to increase his dose of Mirapex due to symptoms of lethargy  He also has findings consistent with peripheral neuropathy which may be contributing to some of his gait disturbance  He does have lumbar stenosis but does not sound like he has neurogenic claudication associated with it  Have recommended blood work to rule out secondary etiologies of his neuropathy and will initiate Azilect titrating 0 5 mg to 1 mg over a week's time  We did discuss potential adverse effects  Depending on how he is doing with that we may consider changing his Mirapex to extended release formulation and hopes to be able to titrate the dose upward without amplifying the side effects  We discussed ways of minimizing fall risk and have recommended considering using a Rollator  I will see him back in follow-up in 6 weeks      Subjective:    HPI  Veronia Phalen is a right-handed man accompanied by his wife with the above complaints  He reports that he has had issues with Parkinson's disease diagnosed about a year ago with his main complaint being problems with his walking  He has noted some slowing down of movements, some softening of his voice volume as well as changes in his handwriting which are much smaller    He reports that he was initially placed on Sinemet which was effective in controlling his symptoms, however he had significant GI upset with this causing abdominal pain nausea and vomiting which went away when he stopped the medication  He was later placed on Mirapex and has been able to titrate up to the 0 25 mg 3 times a day, however doses higher than that caused too much lethargy which he does not tolerate  He finds when he walks sometimes his feet get stuck and he has a difficult time changing direction  He notes issues with his balance but states he is able to cut his grass with a walk behind  and does well when he uses a shopping cart in the store  He does have a history of lumbar stenosis and has received some injections  He denies any pain in his legs when he ambulates  He does exercise regularly using a rowing machine in a bicycle in addition to doing his yd work  He anticipates having surgical procedure for bladder stones in the near future      Past Medical History:   Diagnosis Date    Arthritis     Bladder stones     Hypertension     Parkinson disease ([facility] [facility] )        Family History:  Family History   Problem Relation Age of Onset    No Known Problems Mother     Lung cancer Father     No Known Problems Sister     No Known Problems Sister     Lung cancer Sister        Past Surgical History:  Past Surgical History:   Procedure Laterality Date    TONSILLECTOMY         Social History:   reports that he has never smoked  He has never used smokeless tobacco  He reports previous alcohol use  He reports that he does not use drugs      Allergies:  Carbidopa w-levodopa, Oxycodone, and Vicodin [hydrocodone-acetaminophen]      Current Outpatient Medications:     losartan (COZAAR) 25 mg tablet, Take 25 mg by mouth daily, Disp: , Rfl:     Multiple Vitamins-Minerals (CENTRUM SILVER PO), Take by mouth daily, Disp: , Rfl:     naproxen (NAPROSYN) 500 mg tablet, Take 500 mg by mouth 2 (two) times a day with meals, Disp: , Rfl:     pramipexole (MIRAPEX) 0 25 mg tablet, Take 0 25 mg by mouth 3 (three) times a day, Disp: , Rfl:     tamsulosin (Flomax) 0 4 mg, Take 0 4 mg by mouth daily with dinner, Disp: , Rfl:     terazosin (HYTRIN) 10 MG capsule, , Disp: , Rfl:     amoxicillin (AMOXIL) 500 mg capsule, TAKE 1 CAPSULE BY MOUTH THREE TIMES A DAY FOR 10 DAYS, Disp: , Rfl:     pramipexole (MIRAPEX) 0 5 mg tablet, , Disp: , Rfl:     rasagiline (AZILECT) 0 5 mg, Take 1 tablet (0 5 mg total) by mouth daily, Disp: 7 tablet, Rfl: 0    rasagiline (AZILECT) 1 MG, Take 0 5 tablets (0 5 mg total) by mouth daily, Disp: 30 tablet, Rfl: 5     I have reviewed the past medical, social and family history, current medications, allergies, vitals, review of systems and updated this information as appropriate today     Objective:    Vitals:  Blood pressure 136/82, pulse 68, height 5' 11 5" (1 816 m), weight 86 2 kg (190 lb)  Physical Exam    Neurological Exam    GENERAL:  Cooperative in no acute distress  Well-developed and well-nourished    HEAD and NECK   Head is atraumatic normocephalic with no lesions or masses  Neck is supple with full range of motion    CARDIOVASCULAR  Carotid Arteries-no carotid bruits  NEUROLOGIC:  Mental Status-the patient is awake alert and oriented without aphasia or apraxia  Speech volume was low  Cranial Nerves: Visual fields are full to confrontation  Extraocular movements are full without nystagmus  Pupils are 2-1/2 mm and reactive  Face is symmetrical to light touch  Movements of facial expression move symmetrically  Hearing is normal to finger rub bilaterally  Soft palate lifts symmetrically  Shoulder shrug is symmetrical  Tongue is midline without atrophy  Motor: No drift is noted on arm extension  Strength is full in the upper and lower extremities with normal bulk  cogwheeling rigidity noted in the upper extremities bilaterally  Decreased rapid early movements were noted on the left relative to the right  Sensory: absent temperature and vibratory sensation in the distal lower extremities bilaterally   Cortical function Remarkable that he extinguish the left to double simultaneous stimulation  Coordination: Finger to nose testing is performed accurately  Romberg is   Positive with eyes closed  Gait reveals a  Short-stepped shuffling type gait with a stooped posture and decreased arm swing  Tandem walk could not be performed  Was able to get up out of a chair with arms folded across his chest on the 3rd attempt  There was posture instability with retropulsion testing  Reflexes:  1/4 in the biceps, triceps brachioradialis and knee jerk regions, absent at the ankles  Toes are downgoing  Myerson sign was positive            ROS:    Review of Systems   Constitutional: Positive for fatigue  Negative for appetite change and fever  HENT: Positive for drooling and voice change  Negative for hearing loss, tinnitus and trouble swallowing  Eyes: Negative  Negative for photophobia and pain  Respiratory: Negative  Negative for shortness of breath  Cardiovascular: Negative  Negative for palpitations  Gastrointestinal: Negative  Negative for nausea and vomiting  Endocrine: Negative  Negative for cold intolerance  Genitourinary: Positive for difficulty urinating, enuresis, frequency and hematuria  Negative for dysuria and urgency  Musculoskeletal: Positive for back pain and gait problem (onset 2019)  Negative for myalgias and neck pain  Skin: Negative  Negative for rash  Neurological: Positive for tremors (legs), weakness and numbness  Negative for dizziness, seizures, syncope, facial asymmetry, speech difficulty, light-headedness and headaches  Hematological: Negative  Does not bruise/bleed easily  Psychiatric/Behavioral: Negative  Negative for confusion, hallucinations and sleep disturbance

## 2021-07-13 NOTE — LETTER
July 13, 2021     Jesus Joshua DO  [address] [facility] [address]    Patient: Leonard Green   YOB: 1935   Date of Visit: 7/13/2021       Dear Dr Oleg Alanis: Thank you for referring Leonard Green to me for evaluation  Below are my notes for this consultation  If you have questions, please do not hesitate to call me  I look forward to following your patient along with you  Sincerely,        Elvin Garber MD        CC: No Recipients  Elvin Garber MD  7/13/2021 11:00 AM  Sign when Signing Visit  Leonard Green is a 82 y o  male  Presents today with history of Parkinson's disease with secondary gait difficulty    Assessment:  1  Parkinson disease ([address] )    2  Peripheral neuropathy    3  Spinal stenosis of lumbar region without neurogenic claudication    4  Paresthesia        Plan:   initiate Azilect 0 5 mg titrating to 1 mg  Continue current dose of Mirapex   Blood work  Follow-up 6 weeks    Discussion:  Luna Cortez has Parkinson's disease manifest predominantly as gait disturbance and bradykinesia  Unfortunately was not able to tolerate Sinemet due to GI upset and thus far has not been able to increase his dose of Mirapex due to symptoms of lethargy  He also has findings consistent with peripheral neuropathy which may be contributing to some of his gait disturbance  He does have lumbar stenosis but does not sound like he has neurogenic claudication associated with it  Have recommended blood work to rule out secondary etiologies of his neuropathy and will initiate Azilect titrating 0 5 mg to 1 mg over a week's time  We did discuss potential adverse effects  Depending on how he is doing with that we may consider changing his Mirapex to extended release formulation and hopes to be able to titrate the dose upward without amplifying the side effects  We discussed ways of minimizing fall risk and have recommended considering using a Rollator    I will see him back in follow-up in 6 weeks      Subjective:    HPI  Pattie Leach is a right-handed man accompanied by his wife with the above complaints  He reports that he has had issues with Parkinson's disease diagnosed about a year ago with his main complaint being problems with his walking  He has noted some slowing down of movements, some softening of his voice volume as well as changes in his handwriting which are much smaller  He reports that he was initially placed on Sinemet which was effective in controlling his symptoms, however he had significant GI upset with this causing abdominal pain nausea and vomiting which went away when he stopped the medication  He was later placed on Mirapex and has been able to titrate up to the 0 25 mg 3 times a day, however doses higher than that caused too much lethargy which he does not tolerate  He finds when he walks sometimes his feet get stuck and he has a difficult time changing direction  He notes issues with his balance but states he is able to cut his grass with a walk behind  and does well when he uses a shopping cart in the store  He does have a history of lumbar stenosis and has received some injections  He denies any pain in his legs when he ambulates  He anticipates having surgical procedure for bladder stones in the near future      Past Medical History:   Diagnosis Date    Arthritis     Bladder stones     Hypertension     Parkinson disease ([facility] [address] )        Family History:  Family History   Problem Relation Age of Onset    No Known Problems Mother     Lung cancer Father     No Known Problems Sister     No Known Problems Sister     Lung cancer Sister        Past Surgical History:  Past Surgical History:   Procedure Laterality Date    TONSILLECTOMY         Social History:   reports that he has never smoked  He has never used smokeless tobacco  He reports previous alcohol use  He reports that he does not use drugs      Allergies:  Carbidopa w-levodopa, Oxycodone, and Vicodin [hydrocodone-acetaminophen]      Current Outpatient Medications:     losartan (COZAAR) 25 mg tablet, Take 25 mg by mouth daily, Disp: , Rfl:     Multiple Vitamins-Minerals (CENTRUM SILVER PO), Take by mouth daily, Disp: , Rfl:     naproxen (NAPROSYN) 500 mg tablet, Take 500 mg by mouth 2 (two) times a day with meals, Disp: , Rfl:     pramipexole (MIRAPEX) 0 25 mg tablet, Take 0 25 mg by mouth 3 (three) times a day, Disp: , Rfl:     tamsulosin (Flomax) 0 4 mg, Take 0 4 mg by mouth daily with dinner, Disp: , Rfl:     terazosin (HYTRIN) 10 MG capsule, , Disp: , Rfl:     amoxicillin (AMOXIL) 500 mg capsule, TAKE 1 CAPSULE BY MOUTH THREE TIMES A DAY FOR 10 DAYS, Disp: , Rfl:     pramipexole (MIRAPEX) 0 5 mg tablet, , Disp: , Rfl:     rasagiline (AZILECT) 0 5 mg, Take 1 tablet (0 5 mg total) by mouth daily, Disp: 7 tablet, Rfl: 0    rasagiline (AZILECT) 1 MG, Take 0 5 tablets (0 5 mg total) by mouth daily, Disp: 30 tablet, Rfl: 5     I have reviewed the past medical, social and family history, current medications, allergies, vitals, review of systems and updated this information as appropriate today     Objective:    Vitals:  Blood pressure 136/82, pulse 68, height 5' 11 5" (1 816 m), weight 86 2 kg (190 lb)  Physical Exam    Neurological Exam    GENERAL:  Cooperative in no acute distress  Well-developed and well-nourished    HEAD and NECK   Head is atraumatic normocephalic with no lesions or masses  Neck is supple with full range of motion    CARDIOVASCULAR  Carotid Arteries-no carotid bruits  NEUROLOGIC:  Mental Status-the patient is awake alert and oriented without aphasia or apraxia  Speech volume was low  Cranial Nerves: Visual fields are full to confrontation  Extraocular movements are full without nystagmus  Pupils are 2-1/2 mm and reactive  Face is symmetrical to light touch  Movements of facial expression move symmetrically  Hearing is normal to finger rub bilaterally   Soft palate lifts symmetrically  Shoulder shrug is symmetrical  Tongue is midline without atrophy  Motor: No drift is noted on arm extension  Strength is full in the upper and lower extremities with normal bulk  cogwheeling rigidity noted in the upper extremities bilaterally  Decreased rapid early movements were noted on the left relative to the right  Sensory: absent temperature and vibratory sensation in the distal lower extremities bilaterally  Cortical function  Remarkable that he extinguish the left to double simultaneous stimulation  Coordination: Finger to nose testing is performed accurately  Romberg is   Positive with eyes closed  Gait reveals a  Short-stepped shuffling type gait with a stooped posture and decreased arm swing  Tandem walk could not be performed  Was able to get up out of a chair with arms folded across his chest on the 3rd attempt  There was posture instability with retropulsion testing  Reflexes:  1/4 in the biceps, triceps brachioradialis and knee jerk regions, absent at the ankles  Toes are downgoing  Myerson sign was positive            ROS:    Review of Systems   Constitutional: Positive for fatigue  Negative for appetite change and fever  HENT: Positive for drooling and voice change  Negative for hearing loss, tinnitus and trouble swallowing  Eyes: Negative  Negative for photophobia and pain  Respiratory: Negative  Negative for shortness of breath  Cardiovascular: Negative  Negative for palpitations  Gastrointestinal: Negative  Negative for nausea and vomiting  Endocrine: Negative  Negative for cold intolerance  Genitourinary: Positive for difficulty urinating, enuresis, frequency and hematuria  Negative for dysuria and urgency  Musculoskeletal: Positive for back pain and gait problem (onset 2019)  Negative for myalgias and neck pain  Skin: Negative  Negative for rash  Neurological: Positive for tremors (legs), weakness and numbness   Negative for dizziness, seizures, syncope, facial asymmetry, speech difficulty, light-headedness and headaches  Hematological: Negative  Does not bruise/bleed easily  Psychiatric/Behavioral: Negative  Negative for confusion, hallucinations and sleep disturbance

## 2021-07-16 ENCOUNTER — OFFICE VISIT (OUTPATIENT)
Dept: LAB | Facility: HOSPITAL | Age: 86
End: 2021-07-16
Attending: UROLOGY
Payer: COMMERCIAL

## 2021-07-16 ENCOUNTER — APPOINTMENT (OUTPATIENT)
Dept: LAB | Facility: HOSPITAL | Age: 86
End: 2021-07-16
Attending: UROLOGY
Payer: COMMERCIAL

## 2021-07-16 DIAGNOSIS — N40.1 BENIGN PROSTATIC HYPERPLASIA WITH URINARY HESITANCY: ICD-10-CM

## 2021-07-16 DIAGNOSIS — R20.2 PARESTHESIA: ICD-10-CM

## 2021-07-16 DIAGNOSIS — R39.11 BENIGN PROSTATIC HYPERPLASIA WITH URINARY HESITANCY: ICD-10-CM

## 2021-07-16 DIAGNOSIS — G62.9 PERIPHERAL NEUROPATHY: ICD-10-CM

## 2021-07-16 LAB
ABO GROUP BLD: NORMAL
ANION GAP SERPL CALCULATED.3IONS-SCNC: 11 MMOL/L (ref 4–13)
APTT PPP: 29 SECONDS (ref 23–37)
ATRIAL RATE: 70 BPM
BASOPHILS # BLD AUTO: 0.02 THOUSANDS/ΜL (ref 0–0.1)
BASOPHILS NFR BLD AUTO: 1 % (ref 0–1)
BLD GP AB SCN SERPL QL: NEGATIVE
BUN SERPL-MCNC: 21 MG/DL (ref 5–25)
CALCIUM SERPL-MCNC: 9.1 MG/DL (ref 8.3–10.1)
CHLORIDE SERPL-SCNC: 108 MMOL/L (ref 100–108)
CO2 SERPL-SCNC: 25 MMOL/L (ref 21–32)
CREAT SERPL-MCNC: 0.97 MG/DL (ref 0.6–1.3)
EOSINOPHIL # BLD AUTO: 0.16 THOUSAND/ΜL (ref 0–0.61)
EOSINOPHIL NFR BLD AUTO: 4 % (ref 0–6)
ERYTHROCYTE [DISTWIDTH] IN BLOOD BY AUTOMATED COUNT: 12.8 % (ref 11.6–15.1)
ERYTHROCYTE [SEDIMENTATION RATE] IN BLOOD: <1 MM/HOUR (ref 0–19)
FOLATE SERPL-MCNC: >20 NG/ML (ref 3.1–17.5)
GFR SERPL CREATININE-BSD FRML MDRD: 71 ML/MIN/1.73SQ M
GLUCOSE P FAST SERPL-MCNC: 100 MG/DL (ref 65–99)
HCT VFR BLD AUTO: 41.7 % (ref 36.5–49.3)
HGB BLD-MCNC: 13.9 G/DL (ref 12–17)
IMM GRANULOCYTES # BLD AUTO: 0.01 THOUSAND/UL (ref 0–0.2)
IMM GRANULOCYTES NFR BLD AUTO: 0 % (ref 0–2)
INR PPP: 1.05 (ref 0.84–1.19)
LYMPHOCYTES # BLD AUTO: 0.88 THOUSANDS/ΜL (ref 0.6–4.47)
LYMPHOCYTES NFR BLD AUTO: 22 % (ref 14–44)
MCH RBC QN AUTO: 32.2 PG (ref 26.8–34.3)
MCHC RBC AUTO-ENTMCNC: 33.3 G/DL (ref 31.4–37.4)
MCV RBC AUTO: 97 FL (ref 82–98)
MONOCYTES # BLD AUTO: 0.25 THOUSAND/ΜL (ref 0.17–1.22)
MONOCYTES NFR BLD AUTO: 6 % (ref 4–12)
NEUTROPHILS # BLD AUTO: 2.64 THOUSANDS/ΜL (ref 1.85–7.62)
NEUTS SEG NFR BLD AUTO: 67 % (ref 43–75)
NRBC BLD AUTO-RTO: 0 /100 WBCS
P AXIS: 63 DEGREES
PLATELET # BLD AUTO: 129 THOUSANDS/UL (ref 149–390)
PMV BLD AUTO: 11.6 FL (ref 8.9–12.7)
POTASSIUM SERPL-SCNC: 4.3 MMOL/L (ref 3.5–5.3)
PR INTERVAL: 174 MS
PROTHROMBIN TIME: 13.1 SECONDS (ref 11.6–14.5)
QRS AXIS: 36 DEGREES
QRSD INTERVAL: 92 MS
QT INTERVAL: 422 MS
QTC INTERVAL: 455 MS
RBC # BLD AUTO: 4.32 MILLION/UL (ref 3.88–5.62)
RH BLD: POSITIVE
SODIUM SERPL-SCNC: 144 MMOL/L (ref 136–145)
SPECIMEN EXPIRATION DATE: NORMAL
T WAVE AXIS: 45 DEGREES
VENTRICULAR RATE: 70 BPM
VIT B12 SERPL-MCNC: 471 PG/ML (ref 100–900)
WBC # BLD AUTO: 3.96 THOUSAND/UL (ref 4.31–10.16)

## 2021-07-16 PROCEDURE — 85730 THROMBOPLASTIN TIME PARTIAL: CPT

## 2021-07-16 PROCEDURE — 36415 COLL VENOUS BLD VENIPUNCTURE: CPT

## 2021-07-16 PROCEDURE — 93005 ELECTROCARDIOGRAM TRACING: CPT

## 2021-07-16 PROCEDURE — 86618 LYME DISEASE ANTIBODY: CPT

## 2021-07-16 PROCEDURE — 80048 BASIC METABOLIC PNL TOTAL CA: CPT

## 2021-07-16 PROCEDURE — 82746 ASSAY OF FOLIC ACID SERUM: CPT

## 2021-07-16 PROCEDURE — 93010 ELECTROCARDIOGRAM REPORT: CPT | Performed by: INTERNAL MEDICINE

## 2021-07-16 PROCEDURE — 84165 PROTEIN E-PHORESIS SERUM: CPT | Performed by: PATHOLOGY

## 2021-07-16 PROCEDURE — 82607 VITAMIN B-12: CPT

## 2021-07-16 PROCEDURE — 86901 BLOOD TYPING SEROLOGIC RH(D): CPT

## 2021-07-16 PROCEDURE — 85025 COMPLETE CBC W/AUTO DIFF WBC: CPT

## 2021-07-16 PROCEDURE — 86900 BLOOD TYPING SEROLOGIC ABO: CPT

## 2021-07-16 PROCEDURE — 84165 PROTEIN E-PHORESIS SERUM: CPT

## 2021-07-16 PROCEDURE — 86850 RBC ANTIBODY SCREEN: CPT

## 2021-07-16 PROCEDURE — 85652 RBC SED RATE AUTOMATED: CPT

## 2021-07-16 PROCEDURE — 87186 SC STD MICRODIL/AGAR DIL: CPT

## 2021-07-16 PROCEDURE — 85610 PROTHROMBIN TIME: CPT

## 2021-07-16 PROCEDURE — 86038 ANTINUCLEAR ANTIBODIES: CPT

## 2021-07-16 PROCEDURE — 87086 URINE CULTURE/COLONY COUNT: CPT

## 2021-07-17 LAB — B BURGDOR IGG+IGM SER-ACNC: 41

## 2021-07-18 LAB
BACTERIA UR CULT: ABNORMAL
BACTERIA UR CULT: ABNORMAL

## 2021-07-19 ENCOUNTER — TELEPHONE (OUTPATIENT)
Dept: UROLOGY | Facility: CLINIC | Age: 86
End: 2021-07-19

## 2021-07-19 DIAGNOSIS — G20 PARKINSON DISEASE (HCC): Primary | ICD-10-CM

## 2021-07-19 DIAGNOSIS — R82.71 BACTERIURIA: Primary | ICD-10-CM

## 2021-07-19 LAB — RYE IGE QN: NEGATIVE

## 2021-07-19 RX ORDER — PRAMIPEXOLE 1.5 MG/1
TABLET, EXTENDED RELEASE ORAL
Qty: 30 TABLET | Refills: 5 | Status: SHIPPED | OUTPATIENT
Start: 2021-07-19 | End: 2021-07-21

## 2021-07-19 RX ORDER — SULFAMETHOXAZOLE AND TRIMETHOPRIM 400; 80 MG/1; MG/1
1 TABLET ORAL EVERY 12 HOURS SCHEDULED
Qty: 10 TABLET | Refills: 0 | Status: SHIPPED | OUTPATIENT
Start: 2021-07-19 | End: 2021-07-24

## 2021-07-19 NOTE — TELEPHONE ENCOUNTER
The following has been sent to our team:    Please let the patient know I have sent him culture specific bactrim to be started 5 days prior to surgery, thank you (sent to [facility] [place])

## 2021-07-19 NOTE — TELEPHONE ENCOUNTER
Call placed to patient, advised per provider of positive urine culture and order for bactrim to be started five days prior to surgery  Patient verbalized understanding and agrees with plan

## 2021-07-20 LAB
ALBUMIN SERPL ELPH-MCNC: 4.45 G/DL (ref 3.5–5)
ALBUMIN SERPL ELPH-MCNC: 69.5 % (ref 52–65)
ALPHA1 GLOB SERPL ELPH-MCNC: 0.26 G/DL (ref 0.1–0.4)
ALPHA1 GLOB SERPL ELPH-MCNC: 4 % (ref 2.5–5)
ALPHA2 GLOB SERPL ELPH-MCNC: 0.56 G/DL (ref 0.4–1.2)
ALPHA2 GLOB SERPL ELPH-MCNC: 8.7 % (ref 7–13)
BETA GLOB ABNORMAL SERPL ELPH-MCNC: 0.35 G/DL (ref 0.4–0.8)
BETA1 GLOB SERPL ELPH-MCNC: 5.5 % (ref 5–13)
BETA2 GLOB SERPL ELPH-MCNC: 3.7 % (ref 2–8)
BETA2+GAMMA GLOB SERPL ELPH-MCNC: 0.24 G/DL (ref 0.2–0.5)
GAMMA GLOB ABNORMAL SERPL ELPH-MCNC: 0.55 G/DL (ref 0.5–1.6)
GAMMA GLOB SERPL ELPH-MCNC: 8.6 % (ref 12–22)
IGG/ALB SER: 2.28 {RATIO} (ref 1.1–1.8)
PROT PATTERN SERPL ELPH-IMP: ABNORMAL
PROT SERPL-MCNC: 6.4 G/DL (ref 6.4–8.2)

## 2021-07-21 ENCOUNTER — TELEPHONE (OUTPATIENT)
Dept: NEUROLOGY | Facility: CLINIC | Age: 86
End: 2021-07-21

## 2021-07-21 DIAGNOSIS — G20 PARKINSON DISEASE (HCC): Primary | ICD-10-CM

## 2021-07-21 RX ORDER — ROPINIROLE 2 MG/1
2 TABLET, FILM COATED, EXTENDED RELEASE ORAL
Qty: 30 TABLET | Refills: 5 | Status: SHIPPED | OUTPATIENT
Start: 2021-07-21 | End: 2021-09-07 | Stop reason: SDUPTHER

## 2021-07-21 NOTE — TELEPHONE ENCOUNTER
Called and spoke to pharmacy  They stated that the patient actually used a GoodRx coupon for the medication and picked up the medication  Called patient, he confirmed he is okay with continuing to use the GoodRx discount and not running it through the insurance because it would cost more money  Nothing further needed at this time

## 2021-07-21 NOTE — TELEPHONE ENCOUNTER
Patient has not been able to tolerate immediate release Mirapex due to lethargy    Request exception

## 2021-07-21 NOTE — TELEPHONE ENCOUNTER
Received a vm from [facility]  They informed us that ropinirole ER is not formulary and not covered, however the formulary alternative, regular ropinirole is covered  Requesting regular ropinirole rx if appropriate  They are placing this rx on hold

## 2021-08-03 NOTE — PRE-PROCEDURE INSTRUCTIONS
Pre-Surgery Instructions:    Have you had / have a sore throat? no  have you had / have a cough less than 1 week? no  Have you had / have a fever greater than 100 0 - 100  4? no  Are you experiencing any shortness of breath? No    Pt is fully vaccinated for covid  All questions answered at this time awaiting TOS phone call on 8/4       Medication Instructions    losartan (COZAAR) 25 mg tablet Instructed patient per Anesthesia Guidelines  Holding on DOS    rasagiline (AZILECT) 1 MG Instructed patient per Anesthesia Guidelines  taking on DOS    rOPINIRole (REQUIP XL) 2 MG 24 hr tablet Instructed patient per Anesthesia Guidelines  Takes in pm 8/4    tamsulosin (Flomax) 0 4 mg Instructed patient per Anesthesia Guidelines  taking on 8/4

## 2021-08-05 ENCOUNTER — HOSPITAL ENCOUNTER (OUTPATIENT)
Facility: HOSPITAL | Age: 86
Setting detail: OUTPATIENT SURGERY
Discharge: HOME | End: 2021-08-06
Attending: UROLOGY | Admitting: UROLOGY
Payer: COMMERCIAL

## 2021-08-05 ENCOUNTER — ANESTHESIA (OUTPATIENT)
Dept: PERIOP | Facility: HOSPITAL | Age: 86
End: 2021-08-05
Payer: COMMERCIAL

## 2021-08-05 ENCOUNTER — TELEPHONE (OUTPATIENT)
Dept: UROLOGY | Facility: CLINIC | Age: 86
End: 2021-08-05

## 2021-08-05 DIAGNOSIS — N21.0 BLADDER STONES: ICD-10-CM

## 2021-08-05 DIAGNOSIS — Z71.2 ENCOUNTER TO DISCUSS TEST RESULTS: Primary | ICD-10-CM

## 2021-08-05 DIAGNOSIS — R39.11 BENIGN PROSTATIC HYPERPLASIA WITH URINARY HESITANCY: ICD-10-CM

## 2021-08-05 DIAGNOSIS — N40.1 BENIGN PROSTATIC HYPERPLASIA WITH URINARY HESITANCY: ICD-10-CM

## 2021-08-05 LAB
ABO GROUP BLD: NORMAL
RH BLD: POSITIVE

## 2021-08-05 PROCEDURE — 88305 TISSUE EXAM BY PATHOLOGIST: CPT | Performed by: PATHOLOGY

## 2021-08-05 PROCEDURE — 82360 CALCULUS ASSAY QUANT: CPT | Performed by: UROLOGY

## 2021-08-05 PROCEDURE — 51050 REMOVAL OF BLADDER STONE: CPT | Performed by: UROLOGY

## 2021-08-05 PROCEDURE — NC001 PR NO CHARGE: Performed by: UROLOGY

## 2021-08-05 PROCEDURE — 52601 PROSTATECTOMY (TURP): CPT | Performed by: UROLOGY

## 2021-08-05 RX ORDER — DIPHENHYDRAMINE HYDROCHLORIDE 50 MG/ML
12.5 INJECTION INTRAMUSCULAR; INTRAVENOUS ONCE AS NEEDED
Status: DISCONTINUED | OUTPATIENT
Start: 2021-08-05 | End: 2021-08-05 | Stop reason: HOSPADM

## 2021-08-05 RX ORDER — ONDANSETRON 4 MG/1
4 TABLET, ORALLY DISINTEGRATING ORAL EVERY 6 HOURS PRN
Qty: 20 TABLET | Refills: 0 | Status: SHIPPED | OUTPATIENT
Start: 2021-08-05 | End: 2021-08-14 | Stop reason: HOSPADM

## 2021-08-05 RX ORDER — LIDOCAINE HYDROCHLORIDE 10 MG/ML
INJECTION, SOLUTION EPIDURAL; INFILTRATION; INTRACAUDAL; PERINEURAL AS NEEDED
Status: DISCONTINUED | OUTPATIENT
Start: 2021-08-05 | End: 2021-08-05

## 2021-08-05 RX ORDER — HYDROMORPHONE HCL/PF 1 MG/ML
0.5 SYRINGE (ML) INJECTION EVERY 2 HOUR PRN
Status: DISCONTINUED | OUTPATIENT
Start: 2021-08-05 | End: 2021-08-06 | Stop reason: HOSPADM

## 2021-08-05 RX ORDER — OXYCODONE HYDROCHLORIDE 5 MG/1
10 TABLET ORAL EVERY 4 HOURS PRN
Status: DISCONTINUED | OUTPATIENT
Start: 2021-08-05 | End: 2021-08-06 | Stop reason: HOSPADM

## 2021-08-05 RX ORDER — GLYCOPYRROLATE 0.2 MG/ML
INJECTION INTRAMUSCULAR; INTRAVENOUS AS NEEDED
Status: DISCONTINUED | OUTPATIENT
Start: 2021-08-05 | End: 2021-08-05

## 2021-08-05 RX ORDER — NAPROXEN 250 MG/1
500 TABLET ORAL 2 TIMES DAILY WITH MEALS
Status: DISCONTINUED | OUTPATIENT
Start: 2021-08-05 | End: 2021-08-06 | Stop reason: HOSPADM

## 2021-08-05 RX ORDER — DOCUSATE SODIUM 100 MG/1
100 CAPSULE, LIQUID FILLED ORAL 2 TIMES DAILY
Status: DISCONTINUED | OUTPATIENT
Start: 2021-08-05 | End: 2021-08-06 | Stop reason: HOSPADM

## 2021-08-05 RX ORDER — PHENAZOPYRIDINE HYDROCHLORIDE 100 MG/1
200 TABLET, FILM COATED ORAL
Status: DISCONTINUED | OUTPATIENT
Start: 2021-08-05 | End: 2021-08-06 | Stop reason: HOSPADM

## 2021-08-05 RX ORDER — SODIUM CHLORIDE 9 MG/ML
INJECTION, SOLUTION INTRAVENOUS AS NEEDED
Status: DISCONTINUED | OUTPATIENT
Start: 2021-08-05 | End: 2021-08-05 | Stop reason: HOSPADM

## 2021-08-05 RX ORDER — MAGNESIUM HYDROXIDE 1200 MG/15ML
LIQUID ORAL AS NEEDED
Status: DISCONTINUED | OUTPATIENT
Start: 2021-08-05 | End: 2021-08-05 | Stop reason: HOSPADM

## 2021-08-05 RX ORDER — LOSARTAN POTASSIUM 25 MG/1
25 TABLET ORAL DAILY
Status: DISCONTINUED | OUTPATIENT
Start: 2021-08-05 | End: 2021-08-06 | Stop reason: HOSPADM

## 2021-08-05 RX ORDER — ROPINIROLE 1 MG/1
2 TABLET, FILM COATED ORAL
Status: DISCONTINUED | OUTPATIENT
Start: 2021-08-05 | End: 2021-08-06 | Stop reason: HOSPADM

## 2021-08-05 RX ORDER — BUPIVACAINE HYDROCHLORIDE 5 MG/ML
INJECTION, SOLUTION EPIDURAL; INTRACAUDAL AS NEEDED
Status: DISCONTINUED | OUTPATIENT
Start: 2021-08-05 | End: 2021-08-05 | Stop reason: HOSPADM

## 2021-08-05 RX ORDER — PROPOFOL 10 MG/ML
INJECTION, EMULSION INTRAVENOUS AS NEEDED
Status: DISCONTINUED | OUTPATIENT
Start: 2021-08-05 | End: 2021-08-05

## 2021-08-05 RX ORDER — SODIUM CHLORIDE, SODIUM LACTATE, POTASSIUM CHLORIDE, CALCIUM CHLORIDE 600; 310; 30; 20 MG/100ML; MG/100ML; MG/100ML; MG/100ML
125 INJECTION, SOLUTION INTRAVENOUS CONTINUOUS
Status: DISCONTINUED | OUTPATIENT
Start: 2021-08-05 | End: 2021-08-06 | Stop reason: HOSPADM

## 2021-08-05 RX ORDER — FENTANYL CITRATE/PF 50 MCG/ML
50 SYRINGE (ML) INJECTION
Status: DISCONTINUED | OUTPATIENT
Start: 2021-08-05 | End: 2021-08-05 | Stop reason: HOSPADM

## 2021-08-05 RX ORDER — DEXAMETHASONE SODIUM PHOSPHATE 4 MG/ML
INJECTION, SOLUTION INTRA-ARTICULAR; INTRALESIONAL; INTRAMUSCULAR; INTRAVENOUS; SOFT TISSUE AS NEEDED
Status: DISCONTINUED | OUTPATIENT
Start: 2021-08-05 | End: 2021-08-05

## 2021-08-05 RX ORDER — SELEGILINE HYDROCHLORIDE 5 MG/1
5 TABLET ORAL
Status: DISCONTINUED | OUTPATIENT
Start: 2021-08-06 | End: 2021-08-06 | Stop reason: HOSPADM

## 2021-08-05 RX ORDER — NEOSTIGMINE METHYLSULFATE 1 MG/ML
INJECTION INTRAVENOUS AS NEEDED
Status: DISCONTINUED | OUTPATIENT
Start: 2021-08-05 | End: 2021-08-05

## 2021-08-05 RX ORDER — DOCUSATE SODIUM 100 MG/1
100 CAPSULE, LIQUID FILLED ORAL 3 TIMES DAILY
Qty: 21 CAPSULE | Refills: 0 | Status: SHIPPED | OUTPATIENT
Start: 2021-08-05 | End: 2021-08-14 | Stop reason: HOSPADM

## 2021-08-05 RX ORDER — PROMETHAZINE HYDROCHLORIDE 25 MG/ML
25 INJECTION, SOLUTION INTRAMUSCULAR; INTRAVENOUS ONCE AS NEEDED
Status: DISCONTINUED | OUTPATIENT
Start: 2021-08-05 | End: 2021-08-05 | Stop reason: HOSPADM

## 2021-08-05 RX ORDER — ROCURONIUM BROMIDE 10 MG/ML
INJECTION, SOLUTION INTRAVENOUS AS NEEDED
Status: DISCONTINUED | OUTPATIENT
Start: 2021-08-05 | End: 2021-08-05

## 2021-08-05 RX ORDER — ONDANSETRON 2 MG/ML
4 INJECTION INTRAMUSCULAR; INTRAVENOUS EVERY 6 HOURS PRN
Status: DISCONTINUED | OUTPATIENT
Start: 2021-08-05 | End: 2021-08-06 | Stop reason: HOSPADM

## 2021-08-05 RX ORDER — OXYCODONE HYDROCHLORIDE 5 MG/1
5 TABLET ORAL EVERY 4 HOURS PRN
Status: DISCONTINUED | OUTPATIENT
Start: 2021-08-05 | End: 2021-08-06 | Stop reason: HOSPADM

## 2021-08-05 RX ORDER — OXYCODONE HYDROCHLORIDE 5 MG/1
5 TABLET ORAL EVERY 4 HOURS PRN
Qty: 8 TABLET | Refills: 0 | Status: SHIPPED | OUTPATIENT
Start: 2021-08-05 | End: 2021-08-10

## 2021-08-05 RX ORDER — CEFAZOLIN SODIUM 2 G/50ML
2000 SOLUTION INTRAVENOUS ONCE
Status: COMPLETED | OUTPATIENT
Start: 2021-08-05 | End: 2021-08-05

## 2021-08-05 RX ORDER — ATROPA BELLADONNA AND OPIUM 16.2; 3 MG/1; MG/1
1 SUPPOSITORY RECTAL EVERY 6 HOURS PRN
Status: DISCONTINUED | OUTPATIENT
Start: 2021-08-05 | End: 2021-08-06 | Stop reason: HOSPADM

## 2021-08-05 RX ORDER — CEFAZOLIN SODIUM 1 G/50ML
1000 SOLUTION INTRAVENOUS EVERY 8 HOURS
Status: COMPLETED | OUTPATIENT
Start: 2021-08-05 | End: 2021-08-06

## 2021-08-05 RX ORDER — SENNOSIDES 8.8 MG/5ML
8.8 LIQUID ORAL
Status: DISCONTINUED | OUTPATIENT
Start: 2021-08-05 | End: 2021-08-06 | Stop reason: HOSPADM

## 2021-08-05 RX ORDER — BACITRACIN, NEOMYCIN, POLYMYXIN B 400; 3.5; 5 [USP'U]/G; MG/G; [USP'U]/G
1 OINTMENT TOPICAL 2 TIMES DAILY
Status: DISCONTINUED | OUTPATIENT
Start: 2021-08-05 | End: 2021-08-06 | Stop reason: HOSPADM

## 2021-08-05 RX ORDER — FENTANYL CITRATE 50 UG/ML
INJECTION, SOLUTION INTRAMUSCULAR; INTRAVENOUS AS NEEDED
Status: DISCONTINUED | OUTPATIENT
Start: 2021-08-05 | End: 2021-08-05

## 2021-08-05 RX ORDER — GABAPENTIN 300 MG/1
300 CAPSULE ORAL ONCE
Status: COMPLETED | OUTPATIENT
Start: 2021-08-05 | End: 2021-08-05

## 2021-08-05 RX ORDER — TERAZOSIN 5 MG/1
10 CAPSULE ORAL
Status: DISCONTINUED | OUTPATIENT
Start: 2021-08-05 | End: 2021-08-06 | Stop reason: HOSPADM

## 2021-08-05 RX ORDER — ACETAMINOPHEN 325 MG/1
975 TABLET ORAL ONCE
Status: COMPLETED | OUTPATIENT
Start: 2021-08-05 | End: 2021-08-05

## 2021-08-05 RX ORDER — ACETAMINOPHEN 325 MG/1
650 TABLET ORAL EVERY 6 HOURS SCHEDULED
Status: DISCONTINUED | OUTPATIENT
Start: 2021-08-05 | End: 2021-08-06 | Stop reason: HOSPADM

## 2021-08-05 RX ORDER — MAGNESIUM HYDROXIDE 1200 MG/15ML
3000 LIQUID ORAL CONTINUOUS
Status: DISCONTINUED | OUTPATIENT
Start: 2021-08-05 | End: 2021-08-06 | Stop reason: HOSPADM

## 2021-08-05 RX ORDER — MAGNESIUM HYDROXIDE/ALUMINUM HYDROXICE/SIMETHICONE 120; 1200; 1200 MG/30ML; MG/30ML; MG/30ML
30 SUSPENSION ORAL EVERY 6 HOURS PRN
Status: DISCONTINUED | OUTPATIENT
Start: 2021-08-05 | End: 2021-08-06 | Stop reason: HOSPADM

## 2021-08-05 RX ORDER — POLYETHYLENE GLYCOL 3350 17 G/17G
17 POWDER, FOR SOLUTION ORAL DAILY
Qty: 119 G | Refills: 0 | Status: SHIPPED | OUTPATIENT
Start: 2021-08-05 | End: 2021-08-14 | Stop reason: HOSPADM

## 2021-08-05 RX ORDER — ONDANSETRON 2 MG/ML
INJECTION INTRAMUSCULAR; INTRAVENOUS AS NEEDED
Status: DISCONTINUED | OUTPATIENT
Start: 2021-08-05 | End: 2021-08-05

## 2021-08-05 RX ADMIN — DEXAMETHASONE SODIUM PHOSPHATE 4 MG: 4 INJECTION, SOLUTION INTRAMUSCULAR; INTRAVENOUS at 09:33

## 2021-08-05 RX ADMIN — PHENAZOPYRIDINE 200 MG: 100 TABLET ORAL at 17:17

## 2021-08-05 RX ADMIN — GLYCOPYRROLATE 0.6 MG: 0.2 INJECTION, SOLUTION INTRAMUSCULAR; INTRAVENOUS at 11:09

## 2021-08-05 RX ADMIN — ATROPA BELLADONNA AND OPIUM 1 SUPPOSITORY: 16.2; 3 SUPPOSITORY RECTAL at 12:38

## 2021-08-05 RX ADMIN — LIDOCAINE HYDROCHLORIDE 50 MG: 10 INJECTION, SOLUTION EPIDURAL; INFILTRATION; INTRACAUDAL; PERINEURAL at 09:29

## 2021-08-05 RX ADMIN — LOSARTAN POTASSIUM 25 MG: 25 TABLET, FILM COATED ORAL at 17:16

## 2021-08-05 RX ADMIN — FENTANYL CITRATE 25 MCG: 50 INJECTION, SOLUTION INTRAMUSCULAR; INTRAVENOUS at 10:10

## 2021-08-05 RX ADMIN — TERAZOSIN HYDROCHLORIDE 10 MG: 5 CAPSULE ORAL at 22:01

## 2021-08-05 RX ADMIN — SODIUM CHLORIDE 3000 ML: 900 IRRIGANT IRRIGATION at 12:30

## 2021-08-05 RX ADMIN — ROPINIROLE HYDROCHLORIDE 2 MG: 1 TABLET, FILM COATED ORAL at 22:07

## 2021-08-05 RX ADMIN — GABAPENTIN 300 MG: 300 CAPSULE ORAL at 09:15

## 2021-08-05 RX ADMIN — SENNOSIDES 8.8 MG: 8.8 SYRUP ORAL at 22:07

## 2021-08-05 RX ADMIN — PHENYLEPHRINE HYDROCHLORIDE 50 MCG/MIN: 10 INJECTION INTRAVENOUS at 09:46

## 2021-08-05 RX ADMIN — SODIUM CHLORIDE, SODIUM LACTATE, POTASSIUM CHLORIDE, AND CALCIUM CHLORIDE 125 ML/HR: .6; .31; .03; .02 INJECTION, SOLUTION INTRAVENOUS at 09:16

## 2021-08-05 RX ADMIN — CEFAZOLIN SODIUM 2000 MG: 2 SOLUTION INTRAVENOUS at 09:18

## 2021-08-05 RX ADMIN — FENTANYL CITRATE 25 MCG: 50 INJECTION, SOLUTION INTRAMUSCULAR; INTRAVENOUS at 10:43

## 2021-08-05 RX ADMIN — NAPROXEN 500 MG: 250 TABLET ORAL at 17:16

## 2021-08-05 RX ADMIN — ONDANSETRON 4 MG: 2 INJECTION INTRAMUSCULAR; INTRAVENOUS at 11:09

## 2021-08-05 RX ADMIN — SODIUM CHLORIDE 3000 ML: 900 IRRIGANT IRRIGATION at 13:40

## 2021-08-05 RX ADMIN — PROPOFOL 100 MG: 10 INJECTION, EMULSION INTRAVENOUS at 09:29

## 2021-08-05 RX ADMIN — CEFAZOLIN SODIUM 1000 MG: 1 SOLUTION INTRAVENOUS at 17:12

## 2021-08-05 RX ADMIN — SODIUM CHLORIDE, SODIUM LACTATE, POTASSIUM CHLORIDE, AND CALCIUM CHLORIDE 125 ML/HR: .6; .31; .03; .02 INJECTION, SOLUTION INTRAVENOUS at 20:33

## 2021-08-05 RX ADMIN — PHENAZOPYRIDINE 200 MG: 100 TABLET ORAL at 12:16

## 2021-08-05 RX ADMIN — NEOSTIGMINE METHYLSULFATE 3 MG: 1 INJECTION INTRAVENOUS at 11:09

## 2021-08-05 RX ADMIN — ROCURONIUM BROMIDE 10 MG: 10 INJECTION, SOLUTION INTRAVENOUS at 10:05

## 2021-08-05 RX ADMIN — CEFAZOLIN SODIUM 1000 MG: 1 SOLUTION INTRAVENOUS at 22:07

## 2021-08-05 RX ADMIN — BACITRACIN ZINC, NEOMYCIN, POLYMYXIN B 1 LARGE APPLICATION: 400; 3.5; 5 OINTMENT TOPICAL at 17:26

## 2021-08-05 RX ADMIN — SODIUM CHLORIDE 3000 ML: 900 IRRIGANT IRRIGATION at 20:44

## 2021-08-05 RX ADMIN — SODIUM CHLORIDE, SODIUM LACTATE, POTASSIUM CHLORIDE, AND CALCIUM CHLORIDE: .6; .31; .03; .02 INJECTION, SOLUTION INTRAVENOUS at 11:22

## 2021-08-05 RX ADMIN — SODIUM CHLORIDE 3000 ML: 900 IRRIGANT IRRIGATION at 21:58

## 2021-08-05 RX ADMIN — FENTANYL CITRATE 50 MCG: 50 INJECTION, SOLUTION INTRAMUSCULAR; INTRAVENOUS at 09:29

## 2021-08-05 RX ADMIN — ROCURONIUM BROMIDE 40 MG: 10 INJECTION, SOLUTION INTRAVENOUS at 09:29

## 2021-08-05 RX ADMIN — DOCUSATE SODIUM 100 MG: 100 CAPSULE, LIQUID FILLED ORAL at 17:26

## 2021-08-05 RX ADMIN — ACETAMINOPHEN 975 MG: 325 TABLET, FILM COATED ORAL at 09:15

## 2021-08-05 NOTE — ANESTHESIA POSTPROCEDURE EVALUATION
Post-Op Assessment Note    CV Status:  Stable  Pain Score: 0    Pain management: adequate     Mental Status:  Alert and awake   Hydration Status:  Euvolemic   PONV Controlled:  Controlled   Airway Patency:  Patent      Post Op Vitals Reviewed: Yes      Staff: CRNA         No complications documented      /79 (08/05/21 1140)    Temp 99 2 °F (37 3 °C) (08/05/21 1140)    Pulse 75 (08/05/21 1140)   Resp 14 (08/05/21 1140)    SpO2 96 % (08/05/21 1140)

## 2021-08-05 NOTE — ANESTHESIA PREPROCEDURE EVALUATION
Procedure:  CYSTOLITHOTOMY OPEN (N/A Bladder)  TRANSURETHRAL RESECTION OF PROSTATE (TURP) (N/A Abdomen)    Relevant Problems   /RENAL   (+) Enlarged prostate with lower urinary tract symptoms (LUTS)      Gross hematuria  - CT abdomen and pelvis with IV contrast from 03/09/2021 reveals bladder lithiasis with multiple large bladder stones, largest measuring 2 cm  Prostatomegaly was also noted  - Would recommend cystoscopy and TRUS evaluation  Consider surgical options such as Urolift versus TURP and open bladder stone removal   - Continue terazosin  - Last PSA 4 4 from 7/2020  Will obtain repeat PSA  DC at next office visit  - Urine sent out for cytology and culture       - F/u for cystoscopy and TRUS     History of Present Illness  Mohamud Yung is a 82 y o  male here for evaluation of gross hematuria x 3 weeks ago  Reports this occurred 1 time, described urine as bright red  He has not had episode of hematuria since this occurrence  Denies any episodes of gross hematuria in the past    Associated symptoms include frequency, urgency, nocturia, weak stream, dribbling, and urge incontinence  Denies any flank pain  Past medical history significant for Parkinson's disease  Reports history of kidney infections and never required hospitalization  Denies history of kidney stones or other urologic issues  Denies personal or family history of cancer  Denies blood thinner use  Patient has been taking terazosin with improvements in nocturia         CT reveals bladder lithiasis with multiple large bladder stones, largest measuring 2 cm, as well as prostatomegaly  PSA from 07/2020 was 4 4        Patient denies any history of smoking  Reports only occasional alcohol use    Physical Exam    Airway    Mallampati score: III  TM Distance: >3 FB  Neck ROM: full     Dental       Cardiovascular  Cardiovascular exam normal    Pulmonary  Pulmonary exam normal     Other Findings        Anesthesia Plan  ASA Score- 3 Anesthesia Type- general with ASA Monitors  Additional Monitors:   Airway Plan: LMA  Plan Factors-    Chart reviewed  EKG reviewed  Existing labs reviewed  Patient summary reviewed  Patient is not a current smoker  Induction- intravenous  Postoperative Plan- Plan for postoperative opioid use  Planned trial extubation    Informed Consent- Anesthetic plan and risks discussed with patient  I personally reviewed this patient with the CRNA  Discussed and agreed on the Anesthesia Plan with the CRNA  Richie Foreman History Comments History Comments   Parkinson disease ([facility] )  Arthritis    Hypertension  Bladder stones    Surgical History     Current as of 08/05/21 0702  TONSILLECTOMY FOOT SURGERY   COLONOSCOPY    Substance History     Current as of 08/05/21 0702  Smoking Status: Never Smoker   Smokeless Tobacco Status: Never Used   Alcohol use: Not Currently   Drug use: Never   Problem List     Current as of 08/05/21 0702  Bladder stones   Gross hematuria   Enlarged prostate with lower urinary tract symptoms (LUTS)   Encounter to discuss test results     1  Parkinson disease ([facility] )    2  Peripheral neuropathy    3  Spinal stenosis of lumbar region without neurogenic claudication    4  Paresthesia          Plan:   initiate Azilect 0 5 mg titrating to 1 mg  Continue current dose of Mirapex   Blood work  Follow-up 6 weeks     Discussion:  Prisca Burleson has Parkinson's disease manifest predominantly as gait disturbance and bradykinesia  Unfortunately was not able to tolerate Sinemet due to GI upset and thus far has not been able to increase his dose of Mirapex due to symptoms of lethargy  He also has findings consistent with peripheral neuropathy which may be contributing to some of his gait disturbance  He does have lumbar stenosis but does not sound like he has neurogenic claudication associated with it    Have recommended blood work to rule out secondary etiologies of his neuropathy and will initiate Azilect titrating 0 5 mg to 1 mg over a week's time  We did discuss potential adverse effects  Depending on how he is doing with that we may consider changing his Mirapex to extended release formulation and hopes to be able to titrate the dose upward without amplifying the side effects  We discussed ways of minimizing fall risk and have recommended considering using a Rollator    I will see him back in follow-up in 6 weeks

## 2021-08-05 NOTE — DISCHARGE INSTRUCTIONS
Transurethral Prostatectomy   WHAT YOU NEED TO KNOW:     Maurice Chambers,    Today you had a transurethral section of prostate an open bladder stone removal, this went very well  I would like you to keep your catheter for 2 weeks  We will likely remove your drain before you go home  I think you will notice that your urination is much better after today's procedure  After surgery such as that be performed today it is common to have urgency and frequency of urination and some blood in the urine, this will go away and then will return in 4 weeks and will then go away again  If you have questions or concerns as you recover, please let us know  Thank you for allowing us to take care of you today,  Dr James Brown      A transurethral prostatectomy is surgery that is done to remove part or all of your prostate gland  This surgery is also called transurethral resection of the prostate (TURP)  DISCHARGE INSTRUCTIONS:   Medicines:   · Pain medicine: You may be given a prescription medicine to decrease pain  Do not wait until the pain is severe before you take this medicine  · Antibiotics: This medicine is given to fight or prevent an infection caused by bacteria  Always take your antibiotics exactly as ordered by your healthcare provider  Do not stop taking your medicine unless directed by your healthcare provider  Never save antibiotics or take leftover antibiotics that were given to you for another illness  · Take your medicine as directed  Contact your healthcare provider if you think your medicine is not helping or if you have side effects  Tell him or her if you are allergic to any medicine  Keep a list of the medicines, vitamins, and herbs you take  Include the amounts, and when and why you take them  Bring the list or the pill bottles to follow-up visits  Carry your medicine list with you in case of an emergency  Follow up with your healthcare provider or urologist as directed:   You may need to return to make sure you do not have an infection, or to have your Aviles catheter removed  Write down your questions so you remember to ask them during your visits  Aviles catheter care: A Aviles catheter is a tube put into your bladder to drain your urine into a bag  Keep the bag of urine well below your waist  Lifting the urine bag higher will make the urine flow back into your bladder, which can cause an infection  Do not pull on the catheter  This may cause pain and bleeding, and the catheter may come out  Do not let the catheter tubing kink, because this will block the flow of urine  Ask for more information about how to care for yourself when you have a Aviles catheter in place  Bladder control:  After surgery, you may leak urine and have trouble controlling when you urinate  Ask for more information about the following ways to help decrease urine leakage:  · Avoid caffeine:  Caffeine can cause problems with bladder control and increase your need to urinate  · Do pelvic floor muscle exercises:  Pelvic floor muscle exercises may help improve your bladder control, if you leak urine  These exercises are done by tightening and relaxing your pelvic muscles  Ask how to do pelvic floor muscle exercises, and how often to do them  · Limit your liquids:  Drink smaller amounts of liquid throughout the day  Do not drink before bedtime  Ask if you should decrease the amount of liquid you drink each day  This may help you control your bladder  · Wear a pad or adult diapers: These may help to absorb leaking urine and decrease the odor  Activity guidelines:  Ask when it is okay for you to return to work and activities, or to have sex  Contact your healthcare provider or urologist if:   · You have a fever  · You have new or more blood in your urine  · You have trouble starting to urinate, or have a weak stream of urine when you urinate  · You feel like you have a full bladder, even after you urinate   You may also leak urine  · You often wake up during the night to urinate  You may also feel the need to urinate right away  · You feel pain and burning when you urinate  · You feel pain or pressure in your lower abdomen  · Your urine looks cloudy, and smells bad  · You have trouble getting an erection or ejaculating  · You have questions or concerns about your condition or care  Seek care immediately or call 911 if:   · You urinate little or not at all  · You have severe abdominal or back pain  · You are dizzy or confused  · You have abdominal pain, nausea, and vomiting  · Your heartbeat is slower than usual     © Copyright [employer] 2018 Information is for End User's use only and may not be sold, redistributed or otherwise used for commercial purposes  All illustrations and images included in CareNotes® are the copyrighted property of [employer]  or [facility]   The above information is an  only  It is not intended as medical advice for individual conditions or treatments  Talk to your doctor, nurse or pharmacist before following any medical regimen to see if it is safe and effective for you

## 2021-08-05 NOTE — OP NOTE
OPERATIVE REPORT  PATIENT NAME: Baldomero Mccabe    :  1935  MRN: [id number]  Pt Location: [facility] OR ROOM 04    SURGERY DATE: 2021    Surgeon(s) and Role:     * Shaw Aragon MD - Primary    Preop Diagnosis:  Bladder stones [N21 0]  Benign prostatic hyperplasia with urinary hesitancy [N40 1, R39 11]    Post-Op Diagnosis Codes: * Bladder stones [N21 0]     * Benign prostatic hyperplasia with urinary hesitancy [N40 1, R39 11]    Procedure(s) (LRB):  CYSTOLITHOTOMY OPEN (N/A)  TRANSURETHRAL RESECTION OF PROSTATE (TURP) (N/A)    Specimen(s):  ID Type Source Tests Collected by Time Destination   1 : prostate chips Tissue Prostate TISSUE EXAM Shaw Aragon MD 2021 1037    A : bladder stones Calculus Urinary Bladder STONE ANALYSIS Shaw Aragon MD 2021 1100        Estimated Blood Loss:   Minimal    Drains:  Closed/Suction Drain Anterior;Right Hip Bulb 10 Fr  (Active)   Number of days: 0       Urethral Catheter Non-latex;Straight-tip; Three way 24 Fr  (Active)   Number of days: 0       Continuous Bladder Irrigation Three-way (Active)   Number of days: 0       Anesthesia Type:   General    Operative Indications:  Bladder stones [N21 0]  Benign prostatic hyperplasia with urinary hesitancy [N40 1, R39 11]      Operative Findings:  Large bladder stones noted, successful TURP with removal of the central/transition zone component of the prostate with an excellent TUR defect with meticulous hemostasis  Open bladder stone removal then performed a multiple layer bladder closure and drain placement  No complications  Complications:   None    Procedure and Technique:    INDICATIONS:    Mr Josef Frias is a pleasant 82y o  year old man whose outpatient urologic workup revealed bladder outlet obstruction and bladder stones  Indications for Transurethral resection of prostate were discussed with him at length including all risks, benefits, and alternatives of this procedure    He also consented to open bladder stone removal      Specific risks relevant to this procedure are infection, bleeding, pain, risk of failure of procedure, risk of the need for secondary procedures, risk of urethral or bladder neck stricture or contraction, risk of ejaculatory dysfunction with subsequent fertility issues, risk of tissue regrowth, and risks of anesthesia  Benefits of this procedure include treatment of urinary retention, removal of vascular prostatic adenomatous tissue, potential decreased risk of recurrent urinary tract infection, and treatment of bladder outlet obstruction  Alternatives to this procedure include suprapubic catheter placement, clean intermittent catheterization, and continued medical therapy  After weighing the above information, the patient wished to proceed with the procedure, and witnessed informed consent was obtained  PROCEDURE SUMMARY:    The patient was brought to the operating room and placed in the supine position for the induction of anesthesia  The patient was then placed in the lithotomy position and prepped and draped using standard sterile technique  All pressure points were carefully padded  A full surgical time-out occurred during which the proper patient, position, and procedure were verified  Intravenous antibiotics were then administered as per the guidelines, and thromboembolism prophylaxis was administered in the form of sequential compression devices  A 27 F resectoscope was inserted into the urethra after gentle dilation of the urethral meatus to 28 F using standard urethral sounds  Cystoscopy revealed high-grade bladder outlet obstruction and multiple bladder stones  The location of the ureteral orifices was identified and respected  Resection of the prostatic adenomatous tissue was carried down to the floor of the bladder  It was then extended out laterally towards the prostatic capsule which was visualized but not violated   The distal margin of the resection was the veru montanum which was also left intact  Hemostasis was obtained using electrocautery after ensuring that the patient's blood pressure was not artificially low due to general anesthesia    All prostate chips were evacuated from the bladder using an Ellick evacuator, and a clean bladder was confirmed under direct vision  Each ureteral orifice was again visualized and remained intact at the end of the case  The specimen was submitted for permanent pathology  A 24 Filipino three-way Aviles catheter was inserted per urethra and was connected to bladder irrigation, which was running clear prior to terminating the procedure  Following this, a incision was made in the suprapubic region in a midline fashion, we dissected through the skin, Camper's and Jacques's fascia, and down to the muscular fascia into the space of Retzius  We were able to place stay sutures into the bladder, this was then opened vertically  We then used a empty sponge stick to remove the large bladder stones, a finger was inserted into the prostate fossa showing no stones within this area  The bladder was then closed in multiple layer fashion with 3-0 Vicryl mucosal layer and 2-0 Vicryl on the seromuscular layer  A closed suction drain was then placed  The fascia was then closed with a running PDS suture  Subcutaneous tissues then approximated with 2-0 Vicryl, the skin was closed with staples given that this patient will have a higher than usual chance for postoperative wound infection given that he has had troubles with infections prior to surgery although he was treated with appropriate prophylactic antibiotics and therapeutic antibiotics to decrease the infection risk  The patient was awakened and then transported to PACU in stable condition  There were no complications and all instrument and sponge counts were correct  DISPOSITION:   PACU to the floor      SPECIMENS:  Prostate Chips    Plan:  The plan going forward will be for overnight admission with continuous bladder irrigation    Pending a smooth postoperative course the patient's CBI will be discontinued tomorrow, he will ambulate and mobilize, his diet will be advanced, and he will be discharged home with Aviles catheter to leg bag with the plan for Aviles catheter removal in 2 weeks           I was present for the entire procedure and A qualified resident physician was not available    Patient Disposition:  PACU     SIGNATURE: Jarrett Richardson MD  DATE: August 5, 2021  TIME: 11:26 AM

## 2021-08-05 NOTE — H&P
H&P Exam - Urology   Jenae Walker 82 y o  male MRN: [id number]  Unit/Bed#: OR [facility] Encounter: [id number]    Assessment/Plan     Assessment:  82-year-old gentleman with gross hematuria, trouble urinating, and bladder stones, here today for transurethral section of prostate in over bladder stone removal, ready for surgery today  Informed consent signed  Plan:  Proceed to Transurethral resection of prostate an open bladder stone removal    History of Present Illness   HPI:  Jenae Walker is a 82 y o  male who presents with gross hematuria, bladder stones, troubles urinating, here today for bladder outlet reduction surgery as well as open bladder stone removal   No changes state of health since the last time we saw him  No new complaints  The following portions of the patient's history were reviewed and updated as appropriate: allergies, current medications, past family history, past medical history, past social history, past surgical history and problem list     Review of Systems   Constitutional: Negative  HENT: Negative  Eyes: Negative  Respiratory: Negative  Cardiovascular: Negative  Gastrointestinal: Negative  Endocrine: Negative  Genitourinary: Positive for difficulty urinating, hematuria and urgency  Musculoskeletal: Negative  Skin: Negative  Allergic/Immunologic: Negative  Neurological: Negative  Hematological: Negative  Psychiatric/Behavioral: Negative          Historical Information   Past Medical History:   Diagnosis Date    Arthritis     Bladder stones     Hypertension     Parkinson disease ([facility] [address] )      Past Surgical History:   Procedure Laterality Date    COLONOSCOPY      FOOT SURGERY      TONSILLECTOMY       Social History   Social History     Substance and Sexual Activity   Alcohol Use Not Currently     Social History     Substance and Sexual Activity   Drug Use Never     Social History     Tobacco Use   Smoking Status Never Smoker   Smokeless Tobacco Never Used     E-Cigarette/Vaping    E-Cigarette Use Never User      E-Cigarette/Vaping Substances    Nicotine No     THC No     CBD No     Flavoring No     Other No     Unknown No      Family History:   Family History   Problem Relation Age of Onset    No Known Problems Mother     Lung cancer Father     No Known Problems Sister     No Known Problems Sister     Lung cancer Sister        Meds/Allergies   all medications and allergies reviewed  Allergies   Allergen Reactions    Carbidopa W-Levodopa GI Intolerance    Oxycodone Vomiting    Vicodin [Hydrocodone-Acetaminophen] Vomiting       Objective   Vitals: Blood pressure 147/73, pulse 73, temperature (!) 97 1 °F (36 2 °C), temperature source Temporal, resp  rate 16, height 5' 11" (1 803 m), weight 83 3 kg (183 lb 10 3 oz), SpO2 97 %  No intake/output data recorded  Invasive Devices     Peripheral Intravenous Line            Peripheral IV 08/05/21 Dorsal (posterior); Left Forearm <1 day                Physical Exam  Vitals reviewed  Constitutional:       General: He is not in acute distress  Appearance: Normal appearance  He is not ill-appearing, toxic-appearing or diaphoretic  HENT:      Head: Normocephalic and atraumatic  Eyes:      General: No scleral icterus  Right eye: No discharge  Left eye: No discharge  Cardiovascular:      Pulses: Normal pulses  Pulmonary:      Effort: Pulmonary effort is normal    Abdominal:      General: There is no distension  Palpations: There is no mass  Tenderness: There is no abdominal tenderness  Hernia: No hernia is present  Genitourinary:     Comments: Deferred for the operating room  Musculoskeletal:         General: No swelling  Skin:     General: Skin is warm  Neurological:      General: No focal deficit present  Mental Status: He is alert and oriented to person, place, and time  Cranial Nerves: Cranial nerve deficit present     Psychiatric: Mood and Affect: Mood normal          Behavior: Behavior normal          Thought Content: Thought content normal          Judgment: Judgment normal          Lab Results: I have personally reviewed pertinent reports  Imaging: I have personally reviewed pertinent reports  EKG, Pathology, and Other Studies: I have personally reviewed pertinent reports  VTE Prophylaxis: Sequential compression device Violetta Patrizia)     Code Status: No Order  Advance Directive and Living Will:      Power of :    POLST:      Counseling / Coordination of Care  Total floor / unit time spent today 15 minutes  Greater than 50% of total time was spent with the patient and / or family counseling and / or coordination of care  A description of the counseling / coordination of care:  Review of the pre, raimundo, and postoperative care for today's procedure

## 2021-08-05 NOTE — TELEPHONE ENCOUNTER
Patient scheduled for void trial with RN for 8/16 at 0900 and 1400  Patient scheduled with Mariya [facility] on 8/18 at 56 for post op with PVR  Patient currently IP, will monitor for d/c and make aware during post op call

## 2021-08-05 NOTE — TELEPHONE ENCOUNTER
The following message has been sent to our clinical team:    The patient is status post open bladder stone removal and TURP, please arrange for Aviles removal and trial of void in 2 weeks, he can then follow-up with an advanced practitioner around that time for a PVR and symptoms check as well

## 2021-08-06 VITALS
TEMPERATURE: 97.2 F | BODY MASS INDEX: 25.48 KG/M2 | WEIGHT: 182 LBS | RESPIRATION RATE: 17 BRPM | SYSTOLIC BLOOD PRESSURE: 125 MMHG | OXYGEN SATURATION: 93 % | DIASTOLIC BLOOD PRESSURE: 65 MMHG | HEIGHT: 71 IN | HEART RATE: 76 BPM

## 2021-08-06 LAB
ANION GAP SERPL CALCULATED.3IONS-SCNC: 9 MMOL/L (ref 4–13)
BUN SERPL-MCNC: 17 MG/DL (ref 5–25)
CALCIUM SERPL-MCNC: 8 MG/DL (ref 8.3–10.1)
CHLORIDE SERPL-SCNC: 104 MMOL/L (ref 100–108)
CO2 SERPL-SCNC: 26 MMOL/L (ref 21–32)
CREAT SERPL-MCNC: 1.08 MG/DL (ref 0.6–1.3)
ERYTHROCYTE [DISTWIDTH] IN BLOOD BY AUTOMATED COUNT: 12.4 % (ref 11.6–15.1)
GFR SERPL CREATININE-BSD FRML MDRD: 63 ML/MIN/1.73SQ M
GLUCOSE SERPL-MCNC: 116 MG/DL (ref 65–140)
HCT VFR BLD AUTO: 36.5 % (ref 36.5–49.3)
HGB BLD-MCNC: 12.7 G/DL (ref 12–17)
MCH RBC QN AUTO: 32.8 PG (ref 26.8–34.3)
MCHC RBC AUTO-ENTMCNC: 34.8 G/DL (ref 31.4–37.4)
MCV RBC AUTO: 94 FL (ref 82–98)
PLATELET # BLD AUTO: 129 THOUSANDS/UL (ref 149–390)
PMV BLD AUTO: 11.7 FL (ref 8.9–12.7)
POTASSIUM SERPL-SCNC: 4 MMOL/L (ref 3.5–5.3)
RBC # BLD AUTO: 3.87 MILLION/UL (ref 3.88–5.62)
SODIUM SERPL-SCNC: 139 MMOL/L (ref 136–145)
WBC # BLD AUTO: 8.36 THOUSAND/UL (ref 4.31–10.16)

## 2021-08-06 PROCEDURE — 85027 COMPLETE CBC AUTOMATED: CPT | Performed by: UROLOGY

## 2021-08-06 PROCEDURE — 99024 POSTOP FOLLOW-UP VISIT: CPT | Performed by: NURSE PRACTITIONER

## 2021-08-06 PROCEDURE — 80048 BASIC METABOLIC PNL TOTAL CA: CPT | Performed by: UROLOGY

## 2021-08-06 RX ORDER — CEPHALEXIN 500 MG/1
500 CAPSULE ORAL EVERY 12 HOURS SCHEDULED
Qty: 10 CAPSULE | Refills: 0 | Status: SHIPPED | OUTPATIENT
Start: 2021-08-06 | End: 2021-08-14 | Stop reason: HOSPADM

## 2021-08-06 RX ADMIN — BACITRACIN ZINC, NEOMYCIN, POLYMYXIN B 1 LARGE APPLICATION: 400; 3.5; 5 OINTMENT TOPICAL at 08:00

## 2021-08-06 RX ADMIN — NAPROXEN 500 MG: 250 TABLET ORAL at 08:00

## 2021-08-06 RX ADMIN — PHENAZOPYRIDINE 200 MG: 100 TABLET ORAL at 08:00

## 2021-08-06 RX ADMIN — SODIUM CHLORIDE 3000 ML: 900 IRRIGANT IRRIGATION at 00:46

## 2021-08-06 RX ADMIN — LOSARTAN POTASSIUM 25 MG: 25 TABLET, FILM COATED ORAL at 08:00

## 2021-08-06 RX ADMIN — SODIUM CHLORIDE, SODIUM LACTATE, POTASSIUM CHLORIDE, AND CALCIUM CHLORIDE 125 ML/HR: .6; .31; .03; .02 INJECTION, SOLUTION INTRAVENOUS at 05:30

## 2021-08-06 RX ADMIN — SODIUM CHLORIDE 3000 ML: 900 IRRIGANT IRRIGATION at 06:29

## 2021-08-06 RX ADMIN — SODIUM CHLORIDE 3000 ML: 900 IRRIGANT IRRIGATION at 03:47

## 2021-08-06 RX ADMIN — SODIUM CHLORIDE 3000 ML: 900 IRRIGANT IRRIGATION at 07:45

## 2021-08-06 RX ADMIN — SELEGILINE HYDROCHLORIDE 5 MG: 5 TABLET ORAL at 08:00

## 2021-08-06 RX ADMIN — DOCUSATE SODIUM 100 MG: 100 CAPSULE, LIQUID FILLED ORAL at 08:53

## 2021-08-06 RX ADMIN — ONDANSETRON 4 MG: 2 INJECTION INTRAMUSCULAR; INTRAVENOUS at 05:48

## 2021-08-06 RX ADMIN — PHENAZOPYRIDINE 200 MG: 100 TABLET ORAL at 12:46

## 2021-08-06 NOTE — PLAN OF CARE
Problem: MOBILITY - ADULT  Goal: Maintain or return to baseline ADL function  Description: INTERVENTIONS:  -  Assess patient's ability to carry out ADLs; assess patient's baseline for ADL function and identify physical deficits which impact ability to perform ADLs (bathing, care of mouth/teeth, toileting, grooming, dressing, etc )  - Assess/evaluate cause of self-care deficits   - Assess range of motion  - Assess patient's mobility; develop plan if impaired  - Assess patient's need for assistive devices and provide as appropriate  - Encourage maximum independence but intervene and supervise when necessary  - Involve family in performance of ADLs  - Assess for home care needs following discharge   - Consider OT consult to assist with ADL evaluation and planning for discharge  - Provide patient education as appropriate  Outcome: Progressing     Problem: SAFETY ADULT  Goal: Maintain or return to baseline ADL function  Description: INTERVENTIONS:  -  Assess patient's ability to carry out ADLs; assess patient's baseline for ADL function and identify physical deficits which impact ability to perform ADLs (bathing, care of mouth/teeth, toileting, grooming, dressing, etc )  - Assess/evaluate cause of self-care deficits   - Assess range of motion  - Assess patient's mobility; develop plan if impaired  - Assess patient's need for assistive devices and provide as appropriate  - Encourage maximum independence but intervene and supervise when necessary  - Involve family in performance of ADLs  - Assess for home care needs following discharge   - Consider OT consult to assist with ADL evaluation and planning for discharge  - Provide patient education as appropriate  Outcome: Progressing     Problem: GASTROINTESTINAL - ADULT  Goal: Minimal or absence of nausea and/or vomiting  Description: INTERVENTIONS:  - Administer IV fluids if ordered to ensure adequate hydration  - Maintain NPO status until nausea and vomiting are resolved  - Nasogastric tube if ordered  - Administer ordered antiemetic medications as needed  - Provide nonpharmacologic comfort measures as appropriate  - Advance diet as tolerated, if ordered  - Consider nutrition services referral to assist patient with adequate nutrition and appropriate food choices  Outcome: Progressing     Problem: PAIN - ADULT  Goal: Verbalizes/displays adequate comfort level or baseline comfort level  Description: Interventions:  - Encourage patient to monitor pain and request assistance  - Assess pain using appropriate pain scale  - Administer analgesics based on type and severity of pain and evaluate response  - Implement non-pharmacological measures as appropriate and evaluate response  - Consider cultural and social influences on pain and pain management  - Notify physician/advanced practitioner if interventions unsuccessful or patient reports new pain  Outcome: Adequate for Discharge     Problem: GASTROINTESTINAL - ADULT  Goal: Maintains adequate nutritional intake  Description: INTERVENTIONS:  - Monitor percentage of each meal consumed  - Identify factors contributing to decreased intake, treat as appropriate  - Assist with meals as needed  - Monitor I&O, weight, and lab values if indicated  - Obtain nutrition services referral as needed  Outcome: Adequate for Discharge

## 2021-08-06 NOTE — DISCHARGE SUMMARY
DISCHARGE SUMMARY    Patient Name: Nathanael Mustafa  Patient MRN: [id number]  Admitting Provider: Jaison Winslow MD  Discharging Provider: Jaison Winslow MD  Primary Care Physician at Discharge: Micki [facility] [place] [phone]   Admission Date: 8/5/2021       Discharge Date: 08/06/21      Admission Diagnosis   Bladder stones [N21 0]  Benign prostatic hyperplasia with urinary hesitancy [N40 1, R39 11]    Discharge Diagnoses  Patient Active Problem List   Diagnosis    Bladder stones    Gross hematuria    Enlarged prostate with lower urinary tract symptoms (LUTS)    Encounter to discuss test results         Germaine is an 82-year-old male  known to group for BPH/ with LUTs, urinary obstruction with formation of bladder calculi  Unfortunately, patient failed medical management  After explanation of Risks vs  Benefits and potential complications; patient was agreeable and furnished informed consent for transurethral resection of the prostate with open cysto litholapaxy  Refer to operative report for details  There were no complications  Patient was admitted for overnight observation and continuous bladder irrigation  Patient remained afebrile, HD stable and pain controlled  Breakfast was tolerated  CBI was clamped  Urine was clear to mild without clots  Patient therefore deemed  stable for discharge  Office will contact patient with date & time for marley catheter removal within 2 weeks  Office appointment pre scheduled for 8/16        Medications  Current Discharge Medication List      CONTINUE these medications which have NOT CHANGED    Details   losartan (COZAAR) 25 mg tablet Take 25 mg by mouth daily      Multiple Vitamins-Minerals (CENTRUM SILVER PO) Take by mouth daily      !! rasagiline (AZILECT) 1 MG Take 0 5 tablets (0 5 mg total) by mouth daily  Qty: 30 tablet, Refills: 5    Associated Diagnoses: Parkinson disease (HCC)      rOPINIRole (REQUIP XL) 2 MG 24 hr tablet Take 1 tablet (2 mg total) by mouth daily at bedtime  Qty: 30 tablet, Refills: 5    Associated Diagnoses: Parkinson disease (HCC)      amoxicillin (AMOXIL) 500 mg capsule TAKE 1 CAPSULE BY MOUTH THREE TIMES A DAY FOR 10 DAYS      naproxen (NAPROSYN) 500 mg tablet Take 500 mg by mouth 2 (two) times a day with meals      !! rasagiline (AZILECT) 0 5 mg Take 1 tablet (0 5 mg total) by mouth daily  Qty: 7 tablet, Refills: 0    Associated Diagnoses: Parkinson disease ([address] )       ! ! - Potential duplicate medications found  Please discuss with provider          Current Discharge Medication List      START taking these medications    Details   docusate sodium (COLACE) 100 mg capsule Take 1 capsule (100 mg total) by mouth 3 (three) times a day for 7 days  Qty: 21 capsule, Refills: 0    Associated Diagnoses: Benign prostatic hyperplasia with urinary hesitancy      ondansetron (ZOFRAN-ODT) 4 mg disintegrating tablet Take 1 tablet (4 mg total) by mouth every 6 (six) hours as needed for nausea or vomiting  Qty: 20 tablet, Refills: 0    Associated Diagnoses: Benign prostatic hyperplasia with urinary hesitancy      oxyCODONE (ROXICODONE) 5 mg immediate release tablet Take 1 tablet (5 mg total) by mouth every 4 (four) hours as needed for moderate pain for up to 5 daysMax Daily Amount: 30 mg  Qty: 8 tablet, Refills: 0    Associated Diagnoses: Benign prostatic hyperplasia with urinary hesitancy      polyethylene glycol (MIRALAX) 17 g packet Take 17 g by mouth daily for 7 days  Qty: 119 g, Refills: 0    Associated Diagnoses: Benign prostatic hyperplasia with urinary hesitancy               Allergies  Allergies   Allergen Reactions    Carbidopa W-Levodopa GI Intolerance    Oxycodone Vomiting    Vicodin [Hydrocodone-Acetaminophen] Vomiting       Outpatient Follow-Up  Future Appointments   Date Time Provider Kimmy Polk   8/16/2021  9:00 AM UROLOGY NURSE [facility]   8/16/2021  2:00 PM UROLOGY NURSE [facility]   8/18/2021 10:30 AM Debbie Evans PA-C [facility]   9/7/2021  1:20 PM Jacque Castillo MD [facility]       Active Issues Requiring Follow-Up  Catheter removal    Test Results Pending at Discharge  Pending Labs     Order Current Status    Stone analysis In process    Tissue Exam In process            Discharge Disposition  Home     Treatments   Aviles catheter     Consults   none    Procedures   CBI--clamped prior to discharge    Operative Procedures Performed  Procedure(s):  CYSTOLITHOTOMY OPEN  TRANSURETHRAL RESECTION OF PROSTATE (TURP)    Pertinent Test Results   Lab Results   Component Value Date    WBC 8 36 08/06/2021    HGB 12 7 08/06/2021    HCT 36 5 08/06/2021    MCV 94 08/06/2021     (L) 08/06/2021     Lab Results   Component Value Date    SODIUM 139 08/06/2021    K 4 0 08/06/2021     08/06/2021    CO2 26 08/06/2021    BUN 17 08/06/2021    CREATININE 1 08 08/06/2021    GLUC 116 08/06/2021    CALCIUM 8 0 (L) 08/06/2021       Pathology Report Pending    Physical Exam at Discharge  Condition of Patient on Discharge: good  /65   Pulse 76   Temp (!) 97 2 °F (36 2 °C)   Resp 17   Ht 5' 11" (1 803 m)   Wt 82 6 kg (182 lb)   SpO2 93%   BMI 25 38 kg/m²   General appearance: alert and oriented, in no acute distress, appears stated age, cooperative and no distress  Head: Normocephalic, without obvious abnormality, atraumatic  Neck: no adenopathy, no carotid bruit, no JVD, supple, symmetrical, trachea midline and thyroid not enlarged, symmetric, no tenderness/mass/nodules  Lungs: clear to auscultation bilaterally  Heart: regular rate and rhythm, S1, S2 normal, no murmur, click, rub or gallop  Abdomen: abnormal findings:  mild tenderness in the lower abdomen and Incision--clean dry and intact  dressing removed    Open to air  Extremities: extremities normal, warm and well-perfused; no cyanosis, clubbing, or edema  Pulses: 2+ and symmetric  Neurologic: Grossly normal  Aviles--clear margaret  ZAHRA drain--removed secondary to low output  Serosanguineous  I/O last 3 completed shifts: In: 1 [P O :600;  I V :3500]  Out: 15849 [Urine:14148; Drains:80]  I/O this shift:  In: 240 [P O :240]  Out: 15[address]

## 2021-08-06 NOTE — PLAN OF CARE
Problem: MOBILITY - ADULT  Goal: Maintain or return to baseline ADL function  Description: INTERVENTIONS:  -  Assess patient's ability to carry out ADLs; assess patient's baseline for ADL function and identify physical deficits which impact ability to perform ADLs (bathing, care of mouth/teeth, toileting, grooming, dressing, etc )  - Assess/evaluate cause of self-care deficits   - Assess range of motion  - Assess patient's mobility; develop plan if impaired  - Assess patient's need for assistive devices and provide as appropriate  - Encourage maximum independence but intervene and supervise when necessary  - Involve family in performance of ADLs  - Assess for home care needs following discharge   - Consider OT consult to assist with ADL evaluation and planning for discharge  - Provide patient education as appropriate  8/6/2021 0129 by Henry Elmore RN  Outcome: Progressing  8/6/2021 0128 by Henry Elmore RN  Outcome: Progressing  Goal: Maintains/Returns to pre admission functional level  Description: INTERVENTIONS:  - Perform BMAT or MOVE assessment daily    - Set and communicate daily mobility goal to care team and patient/family/caregiver     - Collaborate with rehabilitation services on mobility goals if consulted  - Record patient progress and toleration of activity level   8/6/2021 0129 by Henry Elmore RN  Outcome: Progressing  8/6/2021 0128 by Henry Elmore RN  Outcome: Progressing     Problem: PAIN - ADULT  Goal: Verbalizes/displays adequate comfort level or baseline comfort level  Description: Interventions:  - Encourage patient to monitor pain and request assistance  - Assess pain using appropriate pain scale  - Administer analgesics based on type and severity of pain and evaluate response  - Implement non-pharmacological measures as appropriate and evaluate response  - Consider cultural and social influences on pain and pain management  - Notify physician/advanced practitioner if interventions unsuccessful or patient reports new pain  8/6/2021 0129 by Carolin Pena RN  Outcome: Progressing  8/6/2021 0128 by Carolin Pena RN  Outcome: Progressing     Problem: INFECTION - ADULT  Goal: Absence or prevention of progression during hospitalization  Description: INTERVENTIONS:  - Assess and monitor for signs and symptoms of infection  - Monitor lab/diagnostic results  - Monitor all insertion sites, i e  indwelling lines, tubes, and drains  - Monitor endotracheal if appropriate and nasal secretions for changes in amount and color  - [place] appropriate cooling/warming therapies per order  - Administer medications as ordered  - Instruct and encourage patient and family to use good hand hygiene technique  - Identify and instruct in appropriate isolation precautions for identified infection/condition  8/6/2021 0129 by Carolin Pena RN  Outcome: Progressing  8/6/2021 0128 by Carolin Pena RN  Outcome: Progressing     Problem: SAFETY ADULT  Goal: Maintain or return to baseline ADL function  Description: INTERVENTIONS:  -  Assess patient's ability to carry out ADLs; assess patient's baseline for ADL function and identify physical deficits which impact ability to perform ADLs (bathing, care of mouth/teeth, toileting, grooming, dressing, etc )  - Assess/evaluate cause of self-care deficits   - Assess range of motion  - Assess patient's mobility; develop plan if impaired  - Assess patient's need for assistive devices and provide as appropriate  - Encourage maximum independence but intervene and supervise when necessary  - Involve family in performance of ADLs  - Assess for home care needs following discharge   - Consider OT consult to assist with ADL evaluation and planning for discharge  - Provide patient education as appropriate  8/6/2021 0129 by Carolin Pena RN  Outcome: Progressing  8/6/2021 0128 by Carolin Pena RN  Outcome: Progressing  Goal: Maintains/Returns to pre admission functional level  Description: INTERVENTIONS:  - Perform BMAT or MOVE assessment daily    - Set and communicate daily mobility goal to care team and patient/family/caregiver     - Collaborate with rehabilitation services on mobility goals if consulted  - Out of bed for toileting  - Record patient progress and toleration of activity level   8/6/2021 0129 by Jd Cadena RN  Outcome: Progressing  8/6/2021 0128 by Jd Cadena RN  Outcome: Progressing  Goal: Patient will remain free of falls  Description: INTERVENTIONS:  - Educate patient/family on patient safety including physical limitations  - Instruct patient to call for assistance with activity   - Consult OT/PT to assist with strengthening/mobility   - Keep Call bell within reach  - Keep bed low and locked with side rails adjusted as appropriate  - Keep care items and personal belongings within reach  - Initiate and maintain comfort rounds  - Make Fall Risk Sign visible to staff  - Apply yellow socks and bracelet for high fall risk patients  - Consider moving patient to room near nurses station  8/6/2021 0129 by Jd Cadena RN  Outcome: Progressing  8/6/2021 0128 by Jd Cadena RN  Outcome: Progressing     Problem: DISCHARGE PLANNING  Goal: Discharge to home or other facility with appropriate resources  Description: INTERVENTIONS:  - Identify barriers to discharge w/patient and caregiver  - Arrange for needed discharge resources and transportation as appropriate  - Identify discharge learning needs (meds, wound care, etc )  - Arrange for interpretive services to assist at discharge as needed  - Refer to Case Management Department for coordinating discharge planning if the patient needs post-hospital services based on physician/advanced practitioner order or complex needs related to functional status, cognitive ability, or social support system  8/6/2021 0129 by Jerome Brewer RN  Outcome: Progressing  8/6/2021 0128 by Jerome Brewer RN  Outcome: Progressing     Problem: Knowledge Deficit  Goal: Patient/family/caregiver demonstrates understanding of disease process, treatment plan, medications, and discharge instructions  Description: Complete learning assessment and assess knowledge base    Interventions:  - Provide teaching at level of understanding  - Provide teaching via preferred learning methods  8/6/2021 0129 by Jerome Brewer RN  Outcome: Progressing  8/6/2021 0128 by Jerome Brewer RN  Outcome: Progressing     Problem: GASTROINTESTINAL - ADULT  Goal: Minimal or absence of nausea and/or vomiting  Description: INTERVENTIONS:  - Administer IV fluids if ordered to ensure adequate hydration  - Maintain NPO status until nausea and vomiting are resolved  - Nasogastric tube if ordered  - Administer ordered antiemetic medications as needed  - Provide nonpharmacologic comfort measures as appropriate  - Advance diet as tolerated, if ordered  - Consider nutrition services referral to assist patient with adequate nutrition and appropriate food choices  Outcome: Progressing  Goal: Maintains or returns to baseline bowel function  Description: INTERVENTIONS:  - Assess bowel function  - Encourage oral fluids to ensure adequate hydration  - Administer IV fluids if ordered to ensure adequate hydration  - Administer ordered medications as needed  - Encourage mobilization and activity  - Consider nutritional services referral to assist patient with adequate nutrition and appropriate food choices  Outcome: Progressing  Goal: Maintains adequate nutritional intake  Description: INTERVENTIONS:  - Monitor percentage of each meal consumed  - Identify factors contributing to decreased intake, treat as appropriate  - Assist with meals as needed  - Monitor I&O, weight, and lab values if indicated  - Obtain nutrition services referral as needed  Outcome: Progressing  Goal: Establish and maintain optimal ostomy function  Description: INTERVENTIONS:  - Assess bowel function  - Encourage oral fluids to ensure adequate hydration  - Administer IV fluids if ordered to ensure adequate hydration   - Administer ordered medications as needed  - Encourage mobilization and activity  - Nutrition services referral to assist patient with appropriate food choices  - Assess stoma site  - Consider wound care consult   Outcome: Progressing  Goal: Oral mucous membranes remain intact  Description: INTERVENTIONS  - Assess oral mucosa and hygiene practices  - Implement preventative oral hygiene regimen  - Implement oral medicated treatments as ordered  - Initiate Nutrition services referral as needed  Outcome: Progressing     Problem: GENITOURINARY - ADULT  Goal: Maintains or returns to baseline urinary function  Description: INTERVENTIONS:  - Assess urinary function  - Encourage oral fluids to ensure adequate hydration if ordered  - Administer IV fluids as ordered to ensure adequate hydration  - Administer ordered medications as needed  - Offer frequent toileting  - Follow urinary retention protocol if ordered  Outcome: Progressing  Goal: Absence of urinary retention  Description: INTERVENTIONS:  - Assess patients ability to void and empty bladder  - Monitor I/O  - Bladder scan as needed  - Discuss with physician/AP medications to alleviate retention as needed  - Discuss catheterization for long term situations as appropriate  Outcome: Progressing  Goal: Urinary catheter remains patent  Description: INTERVENTIONS:  - Assess patency of urinary catheter  - If patient has a chronic marley, consider changing catheter if non-functioning  - Follow guidelines for intermittent irrigation of non-functioning urinary catheter  Outcome: Progressing     Problem: METABOLIC, FLUID AND ELECTROLYTES - ADULT  Goal: Electrolytes maintained within normal limits  Description: INTERVENTIONS:  - Monitor labs and assess patient for signs and symptoms of electrolyte imbalances  - Administer electrolyte replacement as ordered  - Monitor response to electrolyte replacements, including repeat lab results as appropriate  - Instruct patient on fluid and nutrition as appropriate  Outcome: Progressing  Goal: Fluid balance maintained  Description: INTERVENTIONS:  - Monitor labs   - Monitor I/O and WT  - Instruct patient on fluid and nutrition as appropriate  - Assess for signs & symptoms of volume excess or deficit  Outcome: Progressing  Goal: Glucose maintained within target range  Description: INTERVENTIONS:  - Monitor Blood Glucose as ordered  - Assess for signs and symptoms of hyperglycemia and hypoglycemia  - Administer ordered medications to maintain glucose within target range  - Assess nutritional intake and initiate nutrition service referral as needed  Outcome: Progressing

## 2021-08-09 NOTE — TELEPHONE ENCOUNTER
Post Op Note    Earnestine Reed is a 82 y o  male s/p CYSTOLITHOTOMY OPEN (N/A Bladder) TRANSURETHRAL RESECTION OF PROSTATE (TURP)  Performed 08/05/2021  Earnestine Reed is a patient of Dr Jeri Robin and is seen at the [facility] office  How would you rate your pain on a scale from 1 to 10, 10 being the worst pain ever? 0   Have your bowel movements been regular? Yes  Do you have any difficulty urinating? No, has marley in place  If the patient has an incision- do you have any redness around the incision or any drainage from the incision? No  If the patient has a drain- are you having any issues with the drain? No  If the patient has a marley- are you comfortable caring for your marley? Yes   Is it draining urine? Yes   Reviewed AVS, medications and upcoming appointments  Patients wife, Jeri Zurita voices displeasure in two appointments in one day  Made Jeri Zurita aware of reasons/rational of returning in afternoon as well as risks if patient starts to retain urine  Jeri Zurita remains unhappy about returning in afternoon but verbalizes they will do what they have to stating "no reason to bitch about it, it doesn't change anything"  Do you have any other questions or concerns that I can address at this time? No, not at this time

## 2021-08-09 NOTE — TELEPHONE ENCOUNTER
Patients wife Bonny Aldridge called in stating it takes them 40mins to get to the office and they are not waiting all day for the afternoon  Bonny Aldridge wants to know what else can be done   Bonny Aldridge can be reached at [phone]

## 2021-08-11 ENCOUNTER — HOSPITAL ENCOUNTER (INPATIENT)
Facility: HOSPITAL | Age: 86
LOS: 2 days | Discharge: HOME | DRG: 862 | End: 2021-08-14
Attending: EMERGENCY MEDICINE | Admitting: INTERNAL MEDICINE
Payer: COMMERCIAL

## 2021-08-11 DIAGNOSIS — N39.0 URINARY TRACT INFECTION: ICD-10-CM

## 2021-08-11 DIAGNOSIS — R53.81 PHYSICAL DECONDITIONING: ICD-10-CM

## 2021-08-11 DIAGNOSIS — K59.04 CHRONIC IDIOPATHIC CONSTIPATION: ICD-10-CM

## 2021-08-11 DIAGNOSIS — A41.9 SEPSIS (HCC): Primary | ICD-10-CM

## 2021-08-11 LAB
ALBUMIN SERPL BCP-MCNC: 3.6 G/DL (ref 3.5–5)
ALP SERPL-CCNC: 63 U/L (ref 46–116)
ALT SERPL W P-5'-P-CCNC: 25 U/L (ref 12–78)
ANION GAP SERPL CALCULATED.3IONS-SCNC: 10 MMOL/L (ref 4–13)
APTT PPP: 28 SECONDS (ref 23–37)
AST SERPL W P-5'-P-CCNC: 16 U/L (ref 5–45)
BASE EX.OXY STD BLDV CALC-SCNC: 75.1 % (ref 60–80)
BASE EXCESS BLDV CALC-SCNC: 0.7 MMOL/L
BASOPHILS # BLD AUTO: 0.02 THOUSANDS/ΜL (ref 0–0.1)
BASOPHILS NFR BLD AUTO: 0 % (ref 0–1)
BILIRUB SERPL-MCNC: 0.6 MG/DL (ref 0.2–1)
BUN SERPL-MCNC: 19 MG/DL (ref 5–25)
CALCIUM SERPL-MCNC: 8.6 MG/DL (ref 8.3–10.1)
CHLORIDE SERPL-SCNC: 104 MMOL/L (ref 100–108)
CO2 SERPL-SCNC: 26 MMOL/L (ref 21–32)
CREAT SERPL-MCNC: 1.17 MG/DL (ref 0.6–1.3)
EOSINOPHIL # BLD AUTO: 0.06 THOUSAND/ΜL (ref 0–0.61)
EOSINOPHIL NFR BLD AUTO: 0 % (ref 0–6)
ERYTHROCYTE [DISTWIDTH] IN BLOOD BY AUTOMATED COUNT: 12.3 % (ref 11.6–15.1)
GFR SERPL CREATININE-BSD FRML MDRD: 57 ML/MIN/1.73SQ M
GLUCOSE SERPL-MCNC: 117 MG/DL (ref 65–140)
HCO3 BLDV-SCNC: 24.8 MMOL/L (ref 24–30)
HCT VFR BLD AUTO: 38.3 % (ref 36.5–49.3)
HGB BLD-MCNC: 13.1 G/DL (ref 12–17)
IMM GRANULOCYTES # BLD AUTO: 0.1 THOUSAND/UL (ref 0–0.2)
IMM GRANULOCYTES NFR BLD AUTO: 1 % (ref 0–2)
INR PPP: 1.08 (ref 0.84–1.19)
LACTATE SERPL-SCNC: 2.6 MMOL/L (ref 0.5–2)
LYMPHOCYTES # BLD AUTO: 0.37 THOUSANDS/ΜL (ref 0.6–4.47)
LYMPHOCYTES NFR BLD AUTO: 2 % (ref 14–44)
MCH RBC QN AUTO: 32.4 PG (ref 26.8–34.3)
MCHC RBC AUTO-ENTMCNC: 34.2 G/DL (ref 31.4–37.4)
MCV RBC AUTO: 95 FL (ref 82–98)
MONOCYTES # BLD AUTO: 0.44 THOUSAND/ΜL (ref 0.17–1.22)
MONOCYTES NFR BLD AUTO: 3 % (ref 4–12)
NEUTROPHILS # BLD AUTO: 15.82 THOUSANDS/ΜL (ref 1.85–7.62)
NEUTS SEG NFR BLD AUTO: 94 % (ref 43–75)
NRBC BLD AUTO-RTO: 0 /100 WBCS
O2 CT BLDV-SCNC: 14.9 ML/DL
PCO2 BLDV: 38.1 MM HG (ref 42–50)
PH BLDV: 7.43 [PH] (ref 7.3–7.4)
PLATELET # BLD AUTO: 212 THOUSANDS/UL (ref 149–390)
PMV BLD AUTO: 11.2 FL (ref 8.9–12.7)
PO2 BLDV: 39.2 MM HG (ref 35–45)
POTASSIUM SERPL-SCNC: 3.9 MMOL/L (ref 3.5–5.3)
PROT SERPL-MCNC: 6.6 G/DL (ref 6.4–8.2)
PROTHROMBIN TIME: 13.5 SECONDS (ref 11.6–14.5)
RBC # BLD AUTO: 4.04 MILLION/UL (ref 3.88–5.62)
SODIUM SERPL-SCNC: 140 MMOL/L (ref 136–145)
TROPONIN I SERPL-MCNC: <0.02 NG/ML
WBC # BLD AUTO: 16.81 THOUSAND/UL (ref 4.31–10.16)

## 2021-08-11 PROCEDURE — 99285 EMERGENCY DEPT VISIT HI MDM: CPT

## 2021-08-11 PROCEDURE — 84484 ASSAY OF TROPONIN QUANT: CPT | Performed by: EMERGENCY MEDICINE

## 2021-08-11 PROCEDURE — 82805 BLOOD GASES W/O2 SATURATION: CPT | Performed by: EMERGENCY MEDICINE

## 2021-08-11 PROCEDURE — 83605 ASSAY OF LACTIC ACID: CPT | Performed by: EMERGENCY MEDICINE

## 2021-08-11 PROCEDURE — 99285 EMERGENCY DEPT VISIT HI MDM: CPT | Performed by: EMERGENCY MEDICINE

## 2021-08-11 PROCEDURE — 80053 COMPREHEN METABOLIC PANEL: CPT | Performed by: EMERGENCY MEDICINE

## 2021-08-11 PROCEDURE — 85730 THROMBOPLASTIN TIME PARTIAL: CPT | Performed by: EMERGENCY MEDICINE

## 2021-08-11 PROCEDURE — 36415 COLL VENOUS BLD VENIPUNCTURE: CPT | Performed by: EMERGENCY MEDICINE

## 2021-08-11 PROCEDURE — 85025 COMPLETE CBC W/AUTO DIFF WBC: CPT | Performed by: EMERGENCY MEDICINE

## 2021-08-11 PROCEDURE — 84145 PROCALCITONIN (PCT): CPT | Performed by: EMERGENCY MEDICINE

## 2021-08-11 PROCEDURE — 87040 BLOOD CULTURE FOR BACTERIA: CPT | Performed by: EMERGENCY MEDICINE

## 2021-08-11 PROCEDURE — 85610 PROTHROMBIN TIME: CPT | Performed by: EMERGENCY MEDICINE

## 2021-08-11 PROCEDURE — 93005 ELECTROCARDIOGRAM TRACING: CPT

## 2021-08-11 RX ORDER — ONDANSETRON 2 MG/ML
1 INJECTION INTRAMUSCULAR; INTRAVENOUS ONCE
Status: COMPLETED | OUTPATIENT
Start: 2021-08-11 | End: 2021-08-11

## 2021-08-11 RX ORDER — ACETAMINOPHEN 160 MG/5ML
1 SUSPENSION, ORAL (FINAL DOSE FORM) ORAL ONCE
Status: COMPLETED | OUTPATIENT
Start: 2021-08-11 | End: 2021-08-11

## 2021-08-12 ENCOUNTER — APPOINTMENT (EMERGENCY)
Dept: CT IMAGING | Facility: HOSPITAL | Age: 86
DRG: 862 | End: 2021-08-12
Payer: COMMERCIAL

## 2021-08-12 PROBLEM — G20 PARKINSON DISEASE (HCC): Status: ACTIVE | Noted: 2021-08-12

## 2021-08-12 PROBLEM — N39.0 URINARY TRACT INFECTION: Status: ACTIVE | Noted: 2021-08-12

## 2021-08-12 PROBLEM — A41.9 SEPSIS (HCC): Status: ACTIVE | Noted: 2021-08-12

## 2021-08-12 PROBLEM — G20.A1 PARKINSON DISEASE: Status: ACTIVE | Noted: 2021-08-12

## 2021-08-12 PROBLEM — E87.20 LACTIC ACIDOSIS: Status: ACTIVE | Noted: 2021-08-12

## 2021-08-12 PROBLEM — E87.2 LACTIC ACIDOSIS: Status: ACTIVE | Noted: 2021-08-12

## 2021-08-12 LAB
ANION GAP SERPL CALCULATED.3IONS-SCNC: 9 MMOL/L (ref 4–13)
BACTERIA UR QL AUTO: ABNORMAL /HPF
BILIRUB UR QL STRIP: NEGATIVE
BUN SERPL-MCNC: 21 MG/DL (ref 5–25)
CALCIUM SERPL-MCNC: 7.8 MG/DL (ref 8.3–10.1)
CAOX CRY URNS QL MICRO: ABNORMAL /HPF
CHLORIDE SERPL-SCNC: 107 MMOL/L (ref 100–108)
CLARITY UR: ABNORMAL
CO2 SERPL-SCNC: 23 MMOL/L (ref 21–32)
COLOR UR: YELLOW
CREAT SERPL-MCNC: 1.07 MG/DL (ref 0.6–1.3)
ERYTHROCYTE [DISTWIDTH] IN BLOOD BY AUTOMATED COUNT: 12.4 % (ref 11.6–15.1)
GFR SERPL CREATININE-BSD FRML MDRD: 63 ML/MIN/1.73SQ M
GLUCOSE P FAST SERPL-MCNC: 107 MG/DL (ref 65–99)
GLUCOSE SERPL-MCNC: 107 MG/DL (ref 65–140)
GLUCOSE UR STRIP-MCNC: NEGATIVE MG/DL
HCT VFR BLD AUTO: 33.5 % (ref 36.5–49.3)
HGB BLD-MCNC: 11.5 G/DL (ref 12–17)
HGB UR QL STRIP.AUTO: ABNORMAL
KETONES UR STRIP-MCNC: NEGATIVE MG/DL
LACTATE SERPL-SCNC: 1.5 MMOL/L (ref 0.5–2)
LEUKOCYTE ESTERASE UR QL STRIP: ABNORMAL
MCH RBC QN AUTO: 32.9 PG (ref 26.8–34.3)
MCHC RBC AUTO-ENTMCNC: 34.3 G/DL (ref 31.4–37.4)
MCV RBC AUTO: 96 FL (ref 82–98)
NITRITE UR QL STRIP: POSITIVE
NON-SQ EPI CELLS URNS QL MICRO: ABNORMAL /HPF
PH UR STRIP.AUTO: 7 [PH]
PLATELET # BLD AUTO: 194 THOUSANDS/UL (ref 149–390)
PMV BLD AUTO: 11 FL (ref 8.9–12.7)
POTASSIUM SERPL-SCNC: 4.2 MMOL/L (ref 3.5–5.3)
PROCALCITONIN SERPL-MCNC: 0.13 NG/ML
PROT UR STRIP-MCNC: ABNORMAL MG/DL
RBC # BLD AUTO: 3.5 MILLION/UL (ref 3.88–5.62)
RBC #/AREA URNS AUTO: ABNORMAL /HPF
SARS-COV-2 RNA RESP QL NAA+PROBE: NEGATIVE
SODIUM SERPL-SCNC: 139 MMOL/L (ref 136–145)
SP GR UR STRIP.AUTO: 1.02 (ref 1–1.03)
UROBILINOGEN UR QL STRIP.AUTO: 2 E.U./DL
WBC # BLD AUTO: 28.53 THOUSAND/UL (ref 4.31–10.16)
WBC #/AREA URNS AUTO: ABNORMAL /HPF

## 2021-08-12 PROCEDURE — 87040 BLOOD CULTURE FOR BACTERIA: CPT | Performed by: EMERGENCY MEDICINE

## 2021-08-12 PROCEDURE — 99223 1ST HOSP IP/OBS HIGH 75: CPT | Performed by: INTERNAL MEDICINE

## 2021-08-12 PROCEDURE — 80048 BASIC METABOLIC PNL TOTAL CA: CPT | Performed by: PHYSICIAN ASSISTANT

## 2021-08-12 PROCEDURE — U0003 INFECTIOUS AGENT DETECTION BY NUCLEIC ACID (DNA OR RNA); SEVERE ACUTE RESPIRATORY SYNDROME CORONAVIRUS 2 (SARS-COV-2) (CORONAVIRUS DISEASE [COVID-19]), AMPLIFIED PROBE TECHNIQUE, MAKING USE OF HIGH THROUGHPUT TECHNOLOGIES AS DESCRIBED BY CMS-2020-01-R: HCPCS | Performed by: EMERGENCY MEDICINE

## 2021-08-12 PROCEDURE — 87086 URINE CULTURE/COLONY COUNT: CPT | Performed by: EMERGENCY MEDICINE

## 2021-08-12 PROCEDURE — 81001 URINALYSIS AUTO W/SCOPE: CPT | Performed by: EMERGENCY MEDICINE

## 2021-08-12 PROCEDURE — 83605 ASSAY OF LACTIC ACID: CPT | Performed by: EMERGENCY MEDICINE

## 2021-08-12 PROCEDURE — 85027 COMPLETE CBC AUTOMATED: CPT | Performed by: PHYSICIAN ASSISTANT

## 2021-08-12 PROCEDURE — 96367 TX/PROPH/DG ADDL SEQ IV INF: CPT

## 2021-08-12 PROCEDURE — 36415 COLL VENOUS BLD VENIPUNCTURE: CPT | Performed by: EMERGENCY MEDICINE

## 2021-08-12 PROCEDURE — U0005 INFEC AGEN DETEC AMPLI PROBE: HCPCS | Performed by: EMERGENCY MEDICINE

## 2021-08-12 PROCEDURE — 87077 CULTURE AEROBIC IDENTIFY: CPT | Performed by: EMERGENCY MEDICINE

## 2021-08-12 PROCEDURE — 74177 CT ABD & PELVIS W/CONTRAST: CPT

## 2021-08-12 PROCEDURE — 87186 SC STD MICRODIL/AGAR DIL: CPT | Performed by: EMERGENCY MEDICINE

## 2021-08-12 PROCEDURE — NC001 PR NO CHARGE: Performed by: NURSE PRACTITIONER

## 2021-08-12 PROCEDURE — 96365 THER/PROPH/DIAG IV INF INIT: CPT

## 2021-08-12 RX ORDER — ACETAMINOPHEN 325 MG/1
650 TABLET ORAL EVERY 6 HOURS PRN
Status: DISCONTINUED | OUTPATIENT
Start: 2021-08-12 | End: 2021-08-14 | Stop reason: HOSPADM

## 2021-08-12 RX ORDER — ONDANSETRON 2 MG/ML
4 INJECTION INTRAMUSCULAR; INTRAVENOUS EVERY 6 HOURS PRN
Status: DISCONTINUED | OUTPATIENT
Start: 2021-08-12 | End: 2021-08-14 | Stop reason: HOSPADM

## 2021-08-12 RX ORDER — SODIUM CHLORIDE 9 MG/ML
75 INJECTION, SOLUTION INTRAVENOUS CONTINUOUS
Status: DISCONTINUED | OUTPATIENT
Start: 2021-08-12 | End: 2021-08-13

## 2021-08-12 RX ORDER — POLYETHYLENE GLYCOL 3350 17 G/17G
17 POWDER, FOR SOLUTION ORAL DAILY PRN
Status: DISCONTINUED | OUTPATIENT
Start: 2021-08-12 | End: 2021-08-14 | Stop reason: HOSPADM

## 2021-08-12 RX ORDER — DOCUSATE SODIUM 100 MG/1
100 CAPSULE, LIQUID FILLED ORAL 2 TIMES DAILY
Status: DISCONTINUED | OUTPATIENT
Start: 2021-08-12 | End: 2021-08-14 | Stop reason: HOSPADM

## 2021-08-12 RX ORDER — LOSARTAN POTASSIUM 25 MG/1
25 TABLET ORAL DAILY
Status: DISCONTINUED | OUTPATIENT
Start: 2021-08-12 | End: 2021-08-14 | Stop reason: HOSPADM

## 2021-08-12 RX ORDER — MAGNESIUM HYDROXIDE/ALUMINUM HYDROXICE/SIMETHICONE 120; 1200; 1200 MG/30ML; MG/30ML; MG/30ML
30 SUSPENSION ORAL EVERY 6 HOURS PRN
Status: DISCONTINUED | OUTPATIENT
Start: 2021-08-12 | End: 2021-08-14 | Stop reason: HOSPADM

## 2021-08-12 RX ADMIN — CEFEPIME HYDROCHLORIDE 2000 MG: 2 INJECTION, POWDER, FOR SOLUTION INTRAVENOUS at 00:05

## 2021-08-12 RX ADMIN — ENOXAPARIN SODIUM 40 MG: 40 INJECTION SUBCUTANEOUS at 09:14

## 2021-08-12 RX ADMIN — IOHEXOL 100 ML: 350 INJECTION, SOLUTION INTRAVENOUS at 00:24

## 2021-08-12 RX ADMIN — SODIUM CHLORIDE 75 ML/HR: 0.9 INJECTION, SOLUTION INTRAVENOUS at 05:26

## 2021-08-12 RX ADMIN — DOCUSATE SODIUM 100 MG: 100 CAPSULE, LIQUID FILLED ORAL at 18:54

## 2021-08-12 RX ADMIN — SODIUM CHLORIDE 1000 ML: 0.9 INJECTION, SOLUTION INTRAVENOUS at 00:10

## 2021-08-12 RX ADMIN — CEFEPIME HYDROCHLORIDE 2000 MG: 2 INJECTION, POWDER, FOR SOLUTION INTRAVENOUS at 12:13

## 2021-08-12 RX ADMIN — LOSARTAN POTASSIUM 25 MG: 25 TABLET, FILM COATED ORAL at 09:15

## 2021-08-12 RX ADMIN — DOCUSATE SODIUM 100 MG: 100 CAPSULE, LIQUID FILLED ORAL at 09:14

## 2021-08-12 RX ADMIN — VANCOMYCIN HYDROCHLORIDE 1750 MG: 1 INJECTION, POWDER, LYOPHILIZED, FOR SOLUTION INTRAVENOUS at 00:47

## 2021-08-12 NOTE — H&P
[facility]  H&P- Eric Morris 1935, 82 y o  male MRN: [id number]  Unit/Bed#: ED 27 Encounter: [id number]  Primary Care Provider: Laure Calloway DO   Date and time admitted to hospital: 8/11/2021 10:41 PM    * Sepsis [facility])  Assessment & Plan  · Patient presents with wife for acute onset of confusion, rigors and chills in the setting of recent  manipulation for TURP and cysto lithoplaxy 8/5/2021  · Urinalysis grossly (+), CBC reveals leukocytosis, lactic acidosis, tachycardia, tachypnea, hyperthermia  · Admit for urosepsis, agree with cefepime as initiated in the ER  · Follow-up on blood and urine cultures  · Trend CBC and fever curve  · Lactic acidosis already resolved with IV fluids, continue fluids  · CT abdomen/pelvis on admit without acute findings, there is post op changes consistent with recent TURP    Urinary tract infection  Assessment & Plan  · Present on admission as evidenced by grossly (+) urinalysis, with recent GI manipulation  · See plan above, continue cefepime  · Was on Keflex postoperatively    Lactic acidosis  Assessment & Plan  · Present on admission, secondary to sepsis  · Resolved with IV fluid resuscitation    Parkinson disease ([facility] [address] )  Assessment & Plan  · Rasagiline (azilect) is non formulary, wife will need to bring in - day team to contact    VTE Pharmacologic Prophylaxis: VTE Score: 4 Moderate Risk (Score 3-4) - Pharmacological DVT Prophylaxis Ordered: enoxaparin (Lovenox)  Code Status: Prior full code  Discussion with family: Wife has just left the hospital after being here all night, defer contacting to the day team      Anticipated Length of Stay: Patient will be admitted on an inpatient basis with an anticipated length of stay of greater than 2 midnights secondary to Sepsis, urinary tract infection      Total Time for Visit, including Counseling / Coordination of Care: 30 minutes Greater than 50% of this total time spent on direct patient counseling and coordination of care  Chief Complaint:  Acute onset confusion, fever, UTI    History of Present Illness:  Demetrius Kamara is a 82 y o  male with a PMH of Parkinson's disease, lower urinary tract symptoms secondary to enlarged prostate, who underwent recent TURP and cystolitholapaxy who presents with acute onset confusion, rigors, and urinary symptoms  Patient has somewhat limited recollection of events, however he knows he is in the hospital any came here for urinary issues  Review of Systems:  Review of Systems   Constitutional: Positive for chills, diaphoresis and fever  Negative for activity change  Respiratory: Negative for shortness of breath  Cardiovascular: Negative for chest pain and palpitations  Gastrointestinal: Negative for abdominal pain  Genitourinary: Negative for decreased urine volume and penile pain  Aviles catheter since procedure   Musculoskeletal: Negative for back pain  Skin: Positive for rash ( right flank)  Psychiatric/Behavioral: Positive for confusion (Prior to hospital)  Past Medical and Surgical History:   Past Medical History:   Diagnosis Date    Arthritis     Bladder stones     Hypertension     Parkinson disease [facility])        Past Surgical History:   Procedure Laterality Date    COLONOSCOPY      FOOT SURGERY      MD OPEN BLADDER,REMV CALCULUS N/A 8/5/2021    Procedure: CYSTOLITHOTOMY OPEN;  Surgeon: Graham Garcia MD;  Location: [facility];  Service: Urology    MD TRANSURETHRAL ELEC-SURG PROSTATECTOM N/A 8/5/2021    Procedure: TRANSURETHRAL RESECTION OF PROSTATE (TURP); Surgeon: Graham Garcia MD;  Location: [facility];  Service: Urology    TONSILLECTOMY         Meds/Allergies:  Prior to Admission medications    Medication Sig Start Date End Date Taking?  Authorizing Provider   cephalexin (KEFLEX) 500 mg capsule Take 1 capsule (500 mg total) by mouth every 12 (twelve) hours for 5 days 8/6/21 8/11/21  BLAIR Wright   docusate sodium (COLACE) 100 mg capsule Take 1 capsule (100 mg total) by mouth 3 (three) times a day for 7 days 8/5/21 8/12/21  Jarrett Richardson MD   losartan (COZAAR) 25 mg tablet Take 25 mg by mouth daily    Historical Provider, MD   Multiple Vitamins-Minerals (CENTRUM SILVER PO) Take by mouth daily    Historical Provider, MD   naproxen (NAPROSYN) 500 mg tablet Take 500 mg by mouth 2 (two) times a day with meals    Historical Provider, MD   ondansetron (ZOFRAN-ODT) 4 mg disintegrating tablet Take 1 tablet (4 mg total) by mouth every 6 (six) hours as needed for nausea or vomiting 8/5/21   Jarrett Richardson MD   polyethylene glycol (MIRALAX) 17 g packet Take 17 g by mouth daily for 7 days 8/5/21 8/12/21  Jarrett Richardson MD   rasagiline (AZILECT) 0 5 mg Take 1 tablet (0 5 mg total) by mouth daily 7/13/21   Latoya Gustafson MD   rasagiline (AZILECT) 1 MG Take 0 5 tablets (0 5 mg total) by mouth daily 7/13/21   Latoya Gustafson MD   rOPINIRole (REQUIP XL) 2 MG 24 hr tablet Take 1 tablet (2 mg total) by mouth daily at bedtime 7/21/21   Latoya Gustafson MD     I have reviewed home medications with patient family member  Allergies:    Allergies   Allergen Reactions    Carbidopa W-Levodopa GI Intolerance    Oxycodone Vomiting    Vicodin [Hydrocodone-Acetaminophen] Vomiting       Social History:  Marital Status: /Civil Union   Occupation: na  Patient Pre-hospital Living Situation: Home, With spouse  Patient Pre-hospital Level of Mobility: walks  Patient Pre-hospital Diet Restrictions:  None  Substance Use History:   Social History     Substance and Sexual Activity   Alcohol Use Never     Social History     Tobacco Use   Smoking Status Never Smoker   Smokeless Tobacco Never Used     Social History     Substance and Sexual Activity   Drug Use Never       Family History:  Family History   Problem Relation Age of Onset    No Known Problems Mother     Lung cancer Father     No Known Problems Sister     No Known Problems Sister    Claire Calderon Lung cancer Sister        Physical Exam:     Vitals:   Blood Pressure: 116/58 (08/12/21 0130)  Pulse: 90 (08/12/21 0130)  Temperature: 100 5 °F (38 1 °C) (08/11/21 2245)  Temp Source: Oral (08/11/21 2245)  Respirations: 13 (08/12/21 0130)  Height: 5' 11" (180 3 cm) (08/11/21 2245)  Weight - Scale: 83 kg (182 lb 15 7 oz) (08/11/21 2245)  SpO2: 95 % (08/12/21 0130)    Physical Exam  Vitals and nursing note reviewed  Constitutional:       General: He is not in acute distress  Appearance: He is obese  He is diaphoretic  He is not toxic-appearing  Cardiovascular:      Rate and Rhythm: Normal rate and regular rhythm  Heart sounds: No murmur heard  Pulmonary:      Effort: Pulmonary effort is normal  No respiratory distress  Breath sounds: Normal breath sounds  No wheezing  Abdominal:      General: Bowel sounds are normal  There is no distension  Palpations: Abdomen is soft  Tenderness: There is no abdominal tenderness  Comments: Horizontal incision covered by dressing, no active drainage or bleeding noted   Genitourinary:     Comments: Aviles catheter in place, draining dark yellow urine  Skin:     Coloration: Skin is pale  Findings: Rash (Small area of ecchymosis? Versus rash? Right abdominal wall) present  Neurological:      Mental Status: He is alert and oriented to person, place, and time     Psychiatric:         Mood and Affect: Mood normal          Behavior: Behavior normal            Additional Data:     Lab Results:  Results from last 7 days   Lab Units 08/11/21  2316   WBC Thousand/uL 16 81*   HEMOGLOBIN g/dL 13 1   HEMATOCRIT % 38 3   PLATELETS Thousands/uL 212   NEUTROS PCT % 94*   LYMPHS PCT % 2*   MONOS PCT % 3*   EOS PCT % 0     Results from last 7 days   Lab Units 08/11/21  2316   SODIUM mmol/L 140   POTASSIUM mmol/L 3 9   CHLORIDE mmol/L 104   CO2 mmol/L 26   BUN mg/dL 19   CREATININE mg/dL 1 17   ANION GAP mmol/L 10   CALCIUM mg/dL 8 6   ALBUMIN g/dL 3 6 TOTAL BILIRUBIN mg/dL 0 60   ALK PHOS U/L 63   ALT U/L 25   AST U/L 16   GLUCOSE RANDOM mg/dL 117     Results from last 7 days   Lab Units 08/11/21  2316   INR  1 08             Results from last 7 days   Lab Units 08/12/21  0152 08/11/21  2316   LACTIC ACID mmol/L 1 5 2 6*       Imaging: Reviewed radiology reports from this admission including: abdominal/pelvic CT  CT abdomen pelvis with contrast   Final Result by Corrie Jewell MD (08/12 0106)      Postoperative changes of recent TURP procedure with a Aviles catheter with the tip within a collapsed urinary bladder, limiting evaluation  No acute intra-abdominal abnormality  Scattered colonic diverticulosis with no inflammatory changes present to suggest acute diverticulitis  Cholelithiasis with no pericholecystic inflammatory change  Workstation performed: [id number]             EKG and Other Studies Reviewed on Admission:   · EKG: EKG not immediately available to review  ** Please Note: This note has been constructed using a voice recognition system   **

## 2021-08-12 NOTE — ASSESSMENT & PLAN NOTE
· Patient presents with wife for acute onset of confusion, rigors and chills in the setting of recent  manipulation for TURP and cysto lithoplaxy 8/5/2021  · Urinalysis grossly (+), CBC reveals leukocytosis, lactic acidosis, tachycardia, tachypnea, hyperthermia  · Admit for urosepsis, agree with cefepime as initiated in the ER  · Follow-up on blood and urine cultures  · Trend CBC and fever curve  · Lactic acidosis already resolved with IV fluids, continue fluids

## 2021-08-12 NOTE — ASSESSMENT & PLAN NOTE
· Present on admission as evidenced by grossly (+) urinalysis, with recent GI manipulation  · See plan above, continue cefepime  · Was on Keflex postoperatively

## 2021-08-12 NOTE — ED PROVIDER NOTES
History  Chief Complaint   Patient presents with    Post-op Problem     pt had surgery done on thursday to his bladder and prostate where a marley was placed  Pts family called today due to AMS  Pt is A&O X4  Redness noted to both flanks bilaterally  Pt was given 650 mg tylenol for fever 103 and also given zofran to nausea  200cc of fluid given as well  22-year-old male presents status post transurethral resection of the prostate with open cysto litholapaxy at 8/5 by Urology with multiple symptoms  Per the patient's wife, patient has been asymptomatic throughout the day but starting around 2000 hours complained chills followed by progression of fatigue, asthenia, and intermittent episodes of confusion  Patient had 1 episode of nonbloody, nonbilious emesis  Patient was found to be febrile and after he was unable to sit himself up, EMS was contacted who brought him to the emergency room for further evaluation and treatment  Patient answers directed questions appropriately, is alert and oriented but does have slowed responses which per his wife is atypical for the patient  Patient denies any symptoms stating "I feel fine "    Patient has a erythematous patchy rash to the bilateral flanks that is new tonight per the patient's wife  Patient has ecchymosis to his groin that is improving per the patient's wife from the surgery  Patient's wife has been applying local wound care and states that the surgical incision has been improving  They deny any purulence from the wound  Impression and plan:  Multiple symptoms but with a broad differential however considering fever with recent surgery, concerns for potential sepsis, possibly urinary versus intra-abdominal source  Will obtain sepsis evaluation, covered patient with broad-spectrum antibiotics, monitor patient closely and reassess  Will obtain CT imaging of patient's abdomen pelvis and discussed with urology regarding disposition        History provided by:  Patient  Fever - 75 years or older  Temp source:  Oral  Severity:  Moderate  Onset quality:  Sudden  Timing:  Constant  Progression:  Partially resolved  Relieved by:  Acetaminophen  Worsened by:  Nothing  Associated symptoms: chills, confusion, nausea, rash and vomiting    Associated symptoms: no chest pain, no congestion, no cough, no diarrhea, no dysuria, no ear pain, no headaches, no myalgias, no rhinorrhea, no somnolence and no sore throat        Prior to Admission Medications   Prescriptions Last Dose Informant Patient Reported? Taking?    Multiple Vitamins-Minerals (CENTRUM SILVER PO) 8/3 Self Yes No   Sig: Take by mouth daily   cephalexin (KEFLEX) 500 mg capsule   No No   Sig: Take 1 capsule (500 mg total) by mouth every 12 (twelve) hours for 5 days   docusate sodium (COLACE) 100 mg capsule   No No   Sig: Take 1 capsule (100 mg total) by mouth 3 (three) times a day for 7 days   losartan (COZAAR) 25 mg tablet 8/5 Self Yes No   Sig: Take 25 mg by mouth daily   naproxen (NAPROSYN) 500 mg tablet 8/3 Self Yes No   Sig: Take 500 mg by mouth 2 (two) times a day with meals   ondansetron (ZOFRAN-ODT) 4 mg disintegrating tablet Not Taking at Unknown time  No No   Sig: Take 1 tablet (4 mg total) by mouth every 6 (six) hours as needed for nausea or vomiting   Patient not taking: Reported on 8/14/2021   oxyCODONE (ROXICODONE) 5 mg immediate release tablet   No No   Sig: Take 1 tablet (5 mg total) by mouth every 4 (four) hours as needed for moderate pain for up to 5 daysMax Daily Amount: 30 mg   polyethylene glycol (MIRALAX) 17 g packet   No No   Sig: Take 17 g by mouth daily for 7 days   rOPINIRole (REQUIP XL) 2 MG 24 hr tablet 8/5/2021  No No   Sig: Take 1 tablet (2 mg total) by mouth daily at bedtime   rasagiline (AZILECT) 0 5 mg 8/5  No No   Sig: Take 1 tablet (0 5 mg total) by mouth daily   rasagiline (AZILECT) 1 MG 8/5  No No   Sig: Take 0 5 tablets (0 5 mg total) by mouth daily Facility-Administered Medications: None       Past Medical History:   Diagnosis Date    Arthritis     Bladder stones     Hypertension     Parkinson disease [facility])        Past Surgical History:   Procedure Laterality Date    COLONOSCOPY      FOOT SURGERY      NH OPEN BLADDER,REMV CALCULUS N/A 8/5/2021    Procedure: CYSTOLITHOTOMY OPEN;  Surgeon: Tamy Wolf MD;  Location: [facility] OR;  Service: Urology    NH TRANSURETHRAL ELEC-SURG PROSTATECTOM N/A 8/5/2021    Procedure: TRANSURETHRAL RESECTION OF PROSTATE (TURP); Surgeon: Tamy Wolf MD;  Location: [facility];  Service: Urology    TONSILLECTOMY         Family History   Problem Relation Age of Onset    No Known Problems Mother     Lung cancer Father     No Known Problems Sister     No Known Problems Sister     Lung cancer Sister      I have reviewed and agree with the history as documented  E-Cigarette/Vaping    E-Cigarette Use Never User      E-Cigarette/Vaping Substances    Nicotine No     THC No     CBD No     Flavoring No     Other No     Unknown No      Social History     Tobacco Use    Smoking status: Never Smoker    Smokeless tobacco: Never Used   Vaping Use    Vaping Use: Never used   Substance Use Topics    Alcohol use: Never    Drug use: Never       Review of Systems   Constitutional: Positive for chills  HENT: Negative for congestion, ear pain, rhinorrhea and sore throat  Respiratory: Negative for cough  Cardiovascular: Negative for chest pain  Gastrointestinal: Positive for nausea and vomiting  Negative for diarrhea  Genitourinary: Negative for dysuria  Musculoskeletal: Negative for myalgias  Skin: Positive for rash  Neurological: Negative for headaches  Psychiatric/Behavioral: Positive for confusion  All other systems reviewed and are negative  Physical Exam  Physical Exam  Vitals reviewed  Constitutional:       Appearance: He is well-developed  HENT:      Head: Normocephalic     Eyes: General: No scleral icterus  Pupils: Pupils are equal, round, and reactive to light  Cardiovascular:      Rate and Rhythm: Regular rhythm  Tachycardia present  Comments: Borderline tachycardic  Pulmonary:      Effort: Pulmonary effort is normal  No respiratory distress  Breath sounds: Normal breath sounds  No wheezing  Abdominal:      Palpations: Abdomen is soft  Tenderness: There is no abdominal tenderness  There is no right CVA tenderness, left CVA tenderness, guarding or rebound  Genitourinary:     Comments: Ecchymosis of testicles and the penis  Musculoskeletal:      Cervical back: Neck supple  Skin:     General: Skin is warm and dry  Findings: Rash (Bilateral flanks erythematous patchy rash) present  Neurological:      General: No focal deficit present  Mental Status: He is alert and oriented to person, place, and time  Motor: No weakness     Psychiatric:         Mood and Affect: Mood normal          Speech: Speech normal          Vital Signs  ED Triage Vitals   Temperature Pulse Respirations Blood Pressure SpO2   08/11/21 2245 08/11/21 2245 08/11/21 2245 08/11/21 2245 08/11/21 2245   100 5 °F (38 1 °C) (!) 106 20 (!) 182/81 95 %      Temp Source Heart Rate Source Patient Position - Orthostatic VS BP Location FiO2 (%)   08/11/21 2245 08/11/21 2245 08/11/21 2245 08/11/21 2245 --   Oral Monitor Lying Right arm       Pain Score       08/12/21 0800       No Pain           Vitals:    08/13/21 0708 08/13/21 1544 08/13/21 2227 08/14/21 0649   BP: 132/75 136/79 139/81 142/83   Pulse: 80 83 82 71   Patient Position - Orthostatic VS:             Visual Acuity      ED Medications  Medications   ondansetron (FOR EMS ONLY) (ZOFRAN) 4 mg/2 mL injection 4 mg (0 mg Does not apply Given to EMS 8/11/21 2300)   acetaminophen (FOR EMS ONLY) (TYLENOL) oral suspension 650 mg (0 mg Does not apply Given to EMS 8/11/21 2309)   cefepime (MAXIPIME) 2,000 mg in dextrose 5 % 50 mL IVPB (0 mg Intravenous Stopped 8/12/21 0035)   vancomycin (VANCOCIN) 1,750 mg in sodium chloride 0 9 % 500 mL IVPB (0 mg/kg × 83 kg Intravenous Stopped 8/12/21 0247)   sodium chloride 0 9 % bolus 1,000 mL (0 mL Intravenous Stopped 8/12/21 0110)   iohexol (OMNIPAQUE) 350 MG/ML injection (SINGLE-DOSE) 100 mL (100 mL Intravenous Given 8/12/21 0024)       Diagnostic Studies  Results Reviewed     Procedure Component Value Units Date/Time    Blood culture #1 [[id number]] Collected: 08/12/21 0004    Lab Status: Preliminary result Specimen: Blood from Arm, Right Updated: 08/15/21 0801     Blood Culture No Growth at 72 hrs  Blood culture #2 [[id number]] Collected: 08/11/21 2316    Lab Status: Preliminary result Specimen: Blood from Arm, Right Updated: 08/15/21 0801     Blood Culture No Growth at 72 hrs      Urine culture [[id number]]  (Abnormal)  (Susceptibility) Collected: 08/12/21 0004    Lab Status: Final result Specimen: Urine, Other Updated: 08/14/21 0846     Urine Culture >100,000 cfu/ml Pseudomonas aeruginosa    Susceptibility     Pseudomonas aeruginosa (1)     Antibiotic Interpretation Microscan Method Status    ZID Performed  Yes  MARLA Final    Aztreonam ($$$)  Susceptible <=4 ug/ml MARLA Final    Cefepime ($) Susceptible <=2 00 ug/ml MARLA Final    Ceftazidime ($$) Susceptible 2 ug/ml MARLA Final    Ciprofloxacin ($)  Susceptible <=1 00 ug/ml MARLA Final    Gentamicin ($$) Susceptible 4 ug/ml MARLA Final    Imipenem Susceptible 1 ug/ml MARLA Final    Levofloxacin ($) Susceptible 0 50 ug/ml MARLA Final    Meropenem ($$) Susceptible <=1 00 ug/ml MARLA Final    Piperacillin + Tazobactam ($$$) Susceptible <=4 ug/ml MARLA Final    Tobramycin ($) Susceptible <=1 ug/ml MARLA Final                   Basic metabolic panel [[id number]] Collected: 08/14/21 0518    Lab Status: Final result Specimen: Blood from Arm, Right Updated: 08/14/21 0607     Sodium 140 mmol/L      Potassium 3 9 mmol/L      Chloride 105 mmol/L      CO2 26 mmol/L      ANION GAP 9 mmol/L BUN 18 mg/dL      Creatinine 1 00 mg/dL      Glucose 109 mg/dL      Calcium 8 4 mg/dL      eGFR 69 ml/min/1 73sq m     Narrative:      [place] guidelines for Chronic Kidney Disease (CKD):     Stage 1 with normal or high GFR (GFR > 90 mL/min/1 73 square meters)    Stage 2 Mild CKD (GFR = 60-89 mL/min/1 73 square meters)    Stage 3A Moderate CKD (GFR = 45-59 mL/min/1 73 square meters)    Stage 3B Moderate CKD (GFR = 30-44 mL/min/1 73 square meters)    Stage 4 Severe CKD (GFR = 15-29 mL/min/1 73 square meters)    Stage 5 End Stage CKD (GFR <15 mL/min/1 73 square meters)  Note: GFR calculation is accurate only with a steady state creatinine    CBC (With Platelets) [[id number]]  (Abnormal) Collected: 08/14/21 0518    Lab Status: Final result Specimen: Blood from Arm, Right Updated: 08/14/21 0545     WBC 11 06 Thousand/uL      RBC 3 76 Million/uL      Hemoglobin 12 5 g/dL      Hematocrit 35 3 %      MCV 94 fL      MCH 33 2 pg      MCHC 35 4 g/dL      RDW 12 2 %      Platelets 888 Thousands/uL      MPV 11 0 fL     Procalcitonin Reflex [[id number]]  (Abnormal) Collected: 08/13/21 0554    Lab Status: Final result Specimen: Blood from Arm, Right Updated: 08/13/21 1454     Procalcitonin 7 96 ng/ml     Basic metabolic panel [[id number]] Collected: 08/13/21 0553    Lab Status: Final result Specimen: Blood from Arm, Right Updated: 08/13/21 0626     Sodium 138 mmol/L      Potassium 4 2 mmol/L      Chloride 105 mmol/L      CO2 22 mmol/L      ANION GAP 11 mmol/L      BUN 18 mg/dL      Creatinine 1 05 mg/dL      Glucose 119 mg/dL      Calcium 8 6 mg/dL      eGFR 65 ml/min/1 73sq m     Narrative:      [place] guidelines for Chronic Kidney Disease (CKD):     Stage 1 with normal or high GFR (GFR > 90 mL/min/1 73 square meters)    Stage 2 Mild CKD (GFR = 60-89 mL/min/1 73 square meters)    Stage 3A Moderate CKD (GFR = 45-59 mL/min/1 73 square meters)    Stage 3B Moderate CKD (GFR = 30-44 mL/min/1 73 square meters)    Stage 4 Severe CKD (GFR = 15-29 mL/min/1 73 square meters)    Stage 5 End Stage CKD (GFR <15 mL/min/1 73 square meters)  Note: GFR calculation is accurate only with a steady state creatinine    CBC (With Platelets) [[id number]]  (Abnormal) Collected: 08/13/21 0553    Lab Status: Final result Specimen: Blood from Arm, Right Updated: 08/13/21 0612     WBC 18 09 Thousand/uL      RBC 3 90 Million/uL      Hemoglobin 12 7 g/dL      Hematocrit 37 1 %      MCV 95 fL      MCH 32 6 pg      MCHC 34 2 g/dL      RDW 12 5 %      Platelets 727 Thousands/uL      MPV 10 9 fL     Procalcitonin with AM Reflex [[id number]]  (Normal) Collected: 08/11/21 8891    Lab Status: Final result Specimen: Blood from Arm, Right Updated: 08/12/21 0926     Procalcitonin 0 13 ng/ml     Basic metabolic panel [[id number]]  (Abnormal) Collected: 08/12/21 0527    Lab Status: Final result Specimen: Blood from Arm, Right Updated: 08/12/21 0568     Sodium 139 mmol/L      Potassium 4 2 mmol/L      Chloride 107 mmol/L      CO2 23 mmol/L      ANION GAP 9 mmol/L      BUN 21 mg/dL      Creatinine 1 07 mg/dL      Glucose 107 mg/dL      Glucose, Fasting 107 mg/dL      Calcium 7 8 mg/dL      eGFR 63 ml/min/1 73sq m     Narrative:      [place] guidelines for Chronic Kidney Disease (CKD):     Stage 1 with normal or high GFR (GFR > 90 mL/min/1 73 square meters)    Stage 2 Mild CKD (GFR = 60-89 mL/min/1 73 square meters)    Stage 3A Moderate CKD (GFR = 45-59 mL/min/1 73 square meters)    Stage 3B Moderate CKD (GFR = 30-44 mL/min/1 73 square meters)    Stage 4 Severe CKD (GFR = 15-29 mL/min/1 73 square meters)    Stage 5 End Stage CKD (GFR <15 mL/min/1 73 square meters)  Note: GFR calculation is accurate only with a steady state creatinine    CBC (With Platelets) [[id number]]  (Abnormal) Collected: 08/12/21 0283    Lab Status: Final result Specimen: Blood from Arm, Right Updated: 08/12/21 5965 WBC 28 53 Thousand/uL      RBC 3 50 Million/uL      Hemoglobin 11 5 g/dL      Hematocrit 33 5 %      MCV 96 fL      MCH 32 9 pg      MCHC 34 3 g/dL      RDW 12 4 %      Platelets 127 Thousands/uL      MPV 11 0 fL     Lactic acid 2 Hours [[id number]]  (Normal) Collected: 08/12/21 0152    Lab Status: Final result Specimen: Blood from Arm, Left Updated: 08/12/21 4839     LACTIC ACID 1 5 mmol/L     Narrative:      Result may be elevated if tourniquet was used during collection  Novel Coronavirus [facility] [[id number]]  (Normal) Collected: 08/12/21 0013    Lab Status: Final result Specimen: Nares from Nose Updated: 08/12/21 0107     SARS-CoV-2 Negative    Narrative: The specimen collection materials, transport medium, and/or testing methodology utilized in the production of these test results have been proven to be reliable in a limited validation with an abbreviated program under the Emergency Utilization Authorization provided by the FDA  Testing reported as "Presumptive positive" will be confirmed with secondary testing to ensure result accuracy  Clinical caution and judgement should be used with the interpretation of these results with consideration of the clinical impression and other laboratory testing  Testing reported as "Positive" or "Negative" has been proven to be accurate according to standard laboratory validation requirements  All testing is performed with control materials showing appropriate reactivity at standard intervals        Urine Microscopic [[id number]]  (Abnormal) Collected: 08/12/21 0004    Lab Status: Final result Specimen: Urine, Other Updated: 08/12/21 0055     RBC, UA 20-30 /hpf      WBC, UA 20-30 /hpf      Epithelial Cells None Seen /hpf      Bacteria, UA Occasional /hpf      Ca Oxalate Anahy, UA Occasional /hpf     UA w Reflex to Microscopic w Reflex to Culture [[id number]]  (Abnormal) Collected: 08/12/21 0004    Lab Status: Final result Specimen: Urine, Other Updated: 08/12/21 0044     Color, UA Yellow     Clarity, UA Cloudy     Specific Gravity, UA 1 025     pH, UA 7 0     Leukocytes, UA Moderate     Nitrite, UA Positive     Protein,  (2+) mg/dl      Glucose, UA Negative mg/dl      Ketones, UA Negative mg/dl      Urobilinogen, UA 2 0 E U /dl      Bilirubin, UA Negative     Blood, UA Moderate    Lactic acid [[id number]]  (Abnormal) Collected: 08/11/21 2316    Lab Status: Final result Specimen: Blood from Arm, Right Updated: 08/11/21 2355     LACTIC ACID 2 6 mmol/L     Narrative:      Result may be elevated if tourniquet was used during collection      Troponin I [[id number]]  (Normal) Collected: 08/11/21 2316    Lab Status: Final result Specimen: Blood from Arm, Right Updated: 08/11/21 2343     Troponin I <0 02 ng/mL     Comprehensive metabolic panel [[id number]] Collected: 08/11/21 2316    Lab Status: Final result Specimen: Blood from Arm, Right Updated: 08/11/21 2341     Sodium 140 mmol/L      Potassium 3 9 mmol/L      Chloride 104 mmol/L      CO2 26 mmol/L      ANION GAP 10 mmol/L      BUN 19 mg/dL      Creatinine 1 17 mg/dL      Glucose 117 mg/dL      Calcium 8 6 mg/dL      AST 16 U/L      ALT 25 U/L      Alkaline Phosphatase 63 U/L      Total Protein 6 6 g/dL      Albumin 3 6 g/dL      Total Bilirubin 0 60 mg/dL      eGFR 57 ml/min/1 73sq m     Narrative:      [place] guidelines for Chronic Kidney Disease (CKD):     Stage 1 with normal or high GFR (GFR > 90 mL/min/1 73 square meters)    Stage 2 Mild CKD (GFR = 60-89 mL/min/1 73 square meters)    Stage 3A Moderate CKD (GFR = 45-59 mL/min/1 73 square meters)    Stage 3B Moderate CKD (GFR = 30-44 mL/min/1 73 square meters)    Stage 4 Severe CKD (GFR = 15-29 mL/min/1 73 square meters)    Stage 5 End Stage CKD (GFR <15 mL/min/1 73 square meters)  Note: GFR calculation is accurate only with a steady state creatinine    Protime-INR [[id number]]  (Normal) Collected: 08/11/21 2316    Lab Status: Final result Specimen: Blood from Arm, Right Updated: 08/11/21 2340     Protime 13 5 seconds      INR 1 08    APTT [[id number]]  (Normal) Collected: 08/11/21 2316    Lab Status: Final result Specimen: Blood from Arm, Right Updated: 08/11/21 2340     PTT 28 seconds     CBC and differential [[id number]]  (Abnormal) Collected: 08/11/21 2316    Lab Status: Final result Specimen: Blood from Arm, Right Updated: 08/11/21 2330     WBC 16 81 Thousand/uL      RBC 4 04 Million/uL      Hemoglobin 13 1 g/dL      Hematocrit 38 3 %      MCV 95 fL      MCH 32 4 pg      MCHC 34 2 g/dL      RDW 12 3 %      MPV 11 2 fL      Platelets 051 Thousands/uL      nRBC 0 /100 WBCs      Neutrophils Relative 94 %      Immat GRANS % 1 %      Lymphocytes Relative 2 %      Monocytes Relative 3 %      Eosinophils Relative 0 %      Basophils Relative 0 %      Neutrophils Absolute 15 82 Thousands/µL      Immature Grans Absolute 0 10 Thousand/uL      Lymphocytes Absolute 0 37 Thousands/µL      Monocytes Absolute 0 44 Thousand/µL      Eosinophils Absolute 0 06 Thousand/µL      Basophils Absolute 0 02 Thousands/µL     Blood gas, Venous [[id number]]  (Abnormal) Collected: 08/11/21 2316    Lab Status: Final result Specimen: Blood from Arm, Right Updated: 08/11/21 2327     pH, [place] 7 431     pCO2, Dewey 38 1 mm Hg      pO2, [place] 39 2 mm Hg      HCO3, [place] 24 8 mmol/L      Base Excess, De[place] 0 7 mmol/L      O2 Content, [place] 14 9 ml/dL      O2 HGB, VENOUS 75 1 %                  CT abdomen pelvis with contrast   Final Result by Vanessa Li MD (08/12 0106)      Postoperative changes of recent TURP procedure with a Aviles catheter with the tip within a collapsed urinary bladder, limiting evaluation  No acute intra-abdominal abnormality  Scattered colonic diverticulosis with no inflammatory changes present to suggest acute diverticulitis  Cholelithiasis with no pericholecystic inflammatory change              Workstation performed: [id number] Procedures  Procedures         ED Course  ED Course as of Aug 16 0456   Thu Aug 12, 2021   0001 Will check COVID swab however this is less likely based on history  Lymphocytes Absolute(!): 0 37   0056 Prior UCs staph coag neg susceptible to late en cephalosporins and vancomycin  0057 Nitrite, UA(!): Positive   0140 MCHC: 34 2                             SBIRT 22yo+      Most Recent Value   SBIRT (25 yo +)   In order to provide better care to our patients, we are screening all of our patients for alcohol and drug use  Would it be okay to ask you these screening questions? No Filed at: 08/11/2021 2308                    MDM    Disposition  Final diagnoses:   Sepsis ([facility] [address] )     Time reflects when diagnosis was documented in both MDM as applicable and the Disposition within this note     Time User Action Codes Description Comment    8/12/2021  2:05 AM Kareen Lanes Add [A41 9] Sepsis ([facility] [address] )     8/12/2021  2:52 AM Mihai Gale Add [N39 0] Urinary tract infection     8/14/2021 11:21 AM Peter Munson Add [K59 04] Chronic idiopathic constipation     8/14/2021 11:21 AM Peter Munson Add [R53 81] Physical deconditioning       ED Disposition     ED Disposition Condition Date/Time Comment    Admit Stable Thu Aug 12, 2021  2:05 AM Case was discussed with FLIP and the patient's admission status was agreed to be Admission Status: inpatient status to the service of Dr Chanda Phelps           Follow-up Information     Follow up With Specialties Details Why Contact Narayan Quiroz DO Internal Medicine Call in 1 day(s)  [address] [facility] [address]      [facility] OP THERAPY  Follow up [address] [facility]           Discharge Medication List as of 8/14/2021  1:20 PM      CONTINUE these medications which have CHANGED    Details   ciprofloxacin (CIPRO) 500 mg tablet Take 1 tablet (500 mg total) by mouth every 12 (twelve) hours for 13 doses, Starting Sat 8/14/2021, Until Sat 8/21/2021, Normal      docusate sodium (COLACE) 100 mg capsule Take 1 capsule (100 mg total) by mouth 2 (two) times a day, Starting Sat 8/14/2021, Normal      polyethylene glycol (MIRALAX) 17 g packet Take 17 g by mouth daily as needed (for constipation), Starting Sat 8/14/2021, Normal         CONTINUE these medications which have NOT CHANGED    Details   losartan (COZAAR) 25 mg tablet Take 25 mg by mouth daily, Historical Med      Multiple Vitamins-Minerals (CENTRUM SILVER PO) Take by mouth daily, Historical Med      naproxen (NAPROSYN) 500 mg tablet Take 500 mg by mouth 2 (two) times a day with meals, Historical Med      !! rasagiline (AZILECT) 0 5 mg Take 1 tablet (0 5 mg total) by mouth daily, Starting Tue 7/13/2021, Normal      !! rasagiline (AZILECT) 1 MG Take 0 5 tablets (0 5 mg total) by mouth daily, Starting Tue 7/13/2021, Normal      rOPINIRole (REQUIP XL) 2 MG 24 hr tablet Take 1 tablet (2 mg total) by mouth daily at bedtime, Starting Wed 7/21/2021, Normal       !! - Potential duplicate medications found  Please discuss with provider  STOP taking these medications       oxyCODONE (ROXICODONE) 5 mg immediate release tablet Comments:   Reason for Stopping:         cephalexin (KEFLEX) 500 mg capsule Comments:   Reason for Stopping:         ondansetron (ZOFRAN-ODT) 4 mg disintegrating tablet Comments:   Reason for Stopping:             Outpatient Discharge Orders   Ambulatory referral to Physical Therapy   Standing Status: Future Standing Exp   Date: 08/14/22      Activity as tolerated       PDMP Review       Value Time User    PDMP Reviewed  Yes 8/5/2021  9:17 AM Terra Wheatley MD          ED Provider  Electronically Signed by           Caryle Pais, MD  08/16/21 2641

## 2021-08-12 NOTE — CONSULTS
UROLOGY CONSULTATION NOTE     Patient Identifiers: Baldomero Mccabe (MRN [id number])  Service Requesting Consultation: Simona Lehman MD    Service Providing Consultation:  Urology, Raman Hill    Date of Service: 8/12/2021  Inpatient consult to Urology  Consult performed by: BLAIR Hill  Consult ordered by: Ivette Harrell PA-C          Reason for Consultation: UTI    ASSESSMENT:     82 y o  old male with  history of parkinsonism, BPH, chronic urinary retention, bladder calculi, status post TURP with cystolitholapaxy and UTI  TURP with cysto litholapaxy  CT negative for  tract obstruction, abscess or discrete fluid collection  Abdominal surgical incision is clean dry and intact with well approximated staples and no signs of infection  PLAN:   Continue medical optimization, symptom management and empiric antibiosis  Narrow antibiotics per sensitivities  Maintain Aviles catheter to straight drainage  Do not remove  Monitor WBC trend and fever curve  Discharge when medically clear per the Internal Medicine service if afebrile for 24 hours, labs normalized, symptoms managed and with culture driven antimicrobial regimen  Void trial scheduled for Monday next week  Keep appointment as scheduled   surgical intervention not indicated      History of Present Illness:     Baldomero Mccabe is a 82 y o  old with a history of BPH, with obstruction, chronic urinary retention and development of bladder calculi who underwent successful cystoscopy, transurethral resection of prostate with open cysto litholapaxy by Dr Funmilayo Johnson, 8/5  Procedure went well without immediate adverse event or complication  Patient was discharged 08/06/2021 and scheduled for trial of void 8/16 during pre scheduled office follow-up  Patient was in his normal state of health when he developed chills and acute change in mental status per spouse    He was evaluated in the emergency room and found to have leukocytosis with presenting wbc's exceeding 28 K and lactic acidosis  Urinalysis was positive  However, not significantly revealing given presence of indwelling urinary catheter  He was admitted for resuscitation and empiric antibiotics  Cefepime administered as well as aggressive IV fluids which resolved lactic acidosis fairly quickly  Renal function within normal limits  COVID panel negative  Blood in urine cultures pending  CT of the abdomen and pelvis did not demonstrate upper tract obstruction, suspicious masses lesions or obstructing nephroureteral lithiasis  Bladder was collapsed by indwelling Aviles  But no significant perivesical stranding, evidence of air/gas or pelvic fluid collections  There are normal mild inflammatory changes of the subcutaneous soft tissues in the anterior pelvic region consistent with recent surgical procedure  Past Medical, Past Surgical History:     Past Medical History:   Diagnosis Date    Arthritis     Bladder stones     Hypertension     Parkinson disease ([facility] [address] [facility]    :    Past Surgical History:   Procedure Laterality Date    COLONOSCOPY      FOOT SURGERY      OH OPEN BLADDER,REMV CALCULUS N/A 8/5/2021    Procedure: CYSTOLITHOTOMY OPEN;  Surgeon: Jaison Winslow MD;  Location: [facility];  Service: Urology    OH TRANSURETHRAL ELEC-SURG PROSTATECTOM N/A 8/5/2021    Procedure: TRANSURETHRAL RESECTION OF PROSTATE (TURP);   Surgeon: Jaison Winslow MD;  Location: [facility];  Service: Urology    TONSILLECTOMY     :    Medications, Allergies:     Current Facility-Administered Medications   Medication Dose Route Frequency    acetaminophen (TYLENOL) tablet 650 mg  650 mg Oral Q6H PRN    aluminum-magnesium hydroxide-simethicone (MYLANTA) oral suspension 30 mL  30 mL Oral Q6H PRN    cefepime (MAXIPIME) 2,000 mg in dextrose 5 % 50 mL IVPB  2,000 mg Intravenous Q12H    docusate sodium (COLACE) capsule 100 mg  100 mg Oral BID    enoxaparin (LOVENOX) subcutaneous injection 40 mg 40 mg Subcutaneous Daily    losartan (COZAAR) tablet 25 mg  25 mg Oral Daily    ondansetron (ZOFRAN) injection 4 mg  4 mg Intravenous Q6H PRN    polyethylene glycol (MIRALAX) packet 17 g  17 g Oral Daily PRN    sodium chloride 0 9 % infusion  75 mL/hr Intravenous Continuous       Allergies: Allergies   Allergen Reactions    Carbidopa W-Levodopa GI Intolerance    Oxycodone Vomiting    Vicodin [Hydrocodone-Acetaminophen] Vomiting   :    Social and Family History:   Social History:   Social History     Tobacco Use    Smoking status: Never Smoker    Smokeless tobacco: Never Used   Vaping Use    Vaping Use: Never used   Substance Use Topics    Alcohol use: Never    Drug use: Never     Social History     Tobacco Use   Smoking Status Never Smoker   Smokeless Tobacco Never Used       Family History:  Family History   Problem Relation Age of Onset    No Known Problems Mother     Lung cancer Father     No Known Problems Sister     No Known Problems Sister     Lung cancer Sister    :     Review of Systems:     Review of Systems - History obtained from spouse, chart review and the patient  General ROS: negative for - chills or fever  Respiratory ROS: no cough, shortness of breath, or wheezing  Cardiovascular ROS: no chest pain or dyspnea on exertion  Gastrointestinal ROS: no abdominal pain, change in bowel habits, or black or bloody stools  Genito-Urinary ROS: no dysuria, trouble voiding, or hematuria  Neurological ROS: positive for - confusion    All other systems queried were negative  Physical Exam:     /59   Pulse 78   Temp 97 7 °F (36 5 °C) (Oral)   Resp 20   Ht 5' 11" (1 803 m)   Wt 83 kg (182 lb 15 7 oz)   SpO2 96%   BMI 25 52 kg/m² Temp (24hrs), Av 8 °F (37 1 °C), Min:97 7 °F (36 5 °C), Max:100 5 °F (38 1 °C)  current; Temperature: 97 7 °F (36 5 °C)  I/O last 24 hours:   In: 1050 [IV Piggyback:1050]  Out: -     General appearance: alert and oriented, in no acute distress, appears stated age, cooperative and no distress  Head: Normocephalic, without obvious abnormality, atraumatic  Neck: no adenopathy, no carotid bruit, no JVD, supple, symmetrical, trachea midline and thyroid not enlarged, symmetric, no tenderness/mass/nodules  Lungs: diminished breath sounds  Heart: regular rate and rhythm, S1, S2 normal, no murmur, click, rub or gallop  Abdomen: abnormal findings:  mild tenderness in the lower abdomen and No abdominal/suprapubic incision midline  Staples well-approximated without evidence of erythema, edema, crepitus, necrosis or dehiscence  Extremities: extremities normal, warm and well-perfused; no cyanosis, clubbing, or edema  Pulses: 2+ and symmetric  Neurologic: Mental status: alertness: alert, orientation: person, place  Aviles--clear margaret/orange tinged urine    Labs:     Lab Results   Component Value Date    HGB 11 5 (L) 08/12/2021    HCT 33 5 (L) 08/12/2021    WBC 28 53 (H) 08/12/2021     08/12/2021   ]    Lab Results   Component Value Date    K 4 2 08/12/2021     08/12/2021    CO2 23 08/12/2021    BUN 21 08/12/2021    CREATININE 1 07 08/12/2021    CALCIUM 7 8 (L) 08/12/2021   ]    Imaging:   I personally reviewed the images and report of the following studies, and reviewed them with the patient:    CT Abdomen: See below    CT abdomen pelvis with contrast [[id number]] Collected: 08/12/21 0047   Order Status: Completed Updated: 08/12/21 0107   Narrative:     CT ABDOMEN AND PELVIS WITH IV CONTRAST     INDICATION:   sepsis s/p surgery  COMPARISON:  None  TECHNIQUE:  CT examination of the abdomen and pelvis was performed  Axial, sagittal, and coronal 2D reformatted images were created from the source data and submitted for interpretation       Radiation dose length product (DLP) for this visit:  982 mGy-cm    This examination, like all CT scans performed in the [facility], was performed utilizing techniques to minimize radiation dose exposure, including the use of iterative   reconstruction and automated exposure control  IV Contrast:  100 mL of iohexol (OMNIPAQUE)   Enteric Contrast:  Enteric contrast was not administered  FINDINGS:     ABDOMEN     LOWER CHEST:  Atelectatic changes are noted at the lung bases        LIVER/BILIARY TREE:  Unremarkable  GALLBLADDER:  There are gallstone(s) within the gallbladder, without pericholecystic inflammatory changes  SPLEEN:  Unremarkable  PANCREAS:  Unremarkable  ADRENAL GLANDS:  Unremarkable  KIDNEYS/URETERS:  No hydronephrosis or urinary tract calculus   One or more sharply circumscribed subcentimeter renal hypodensities are present, too small to accurately characterize, and statistically most likely benign findings  According to recent   literature (Radiology 2019) no further workup of these findings is recommended  There is trace bilateral perinephric fluid  STOMACH AND BOWEL: Mike Bentley is colonic diverticulosis without evidence of acute diverticulitis  APPENDIX:  A normal appendix was visualized  ABDOMINOPELVIC CAVITY:  No ascites   No pneumoperitoneum   No lymphadenopathy  VESSELS:  Unremarkable for patient's age  PELVIS     REPRODUCTIVE ORGANS:  Postoperative changes of recent transurethral resection of the prostate are noted  URINARY BLADDER: Mike Bentley is a Aviles catheter with the tip within a collapsed urinary bladder  ABDOMINAL WALL/INGUINAL REGIONS: Mike Bentley is mild inflammatory stranding within the subcutaneous soft tissues of the anterior pelvic region likely postoperative in nature   Surgical skin staples also noted at the midline infraumbilical region  Mike Bentley is   mild enlargement of the left anterior pelvic wall musculature when compared to the right, also likely postoperative in nature  OSSEOUS STRUCTURES:  No acute fracture or destructive osseous lesion  Mike Bentley is a T12 vertebral body hemangioma      Impression:       Postoperative changes of recent TURP procedure with a Aviles catheter with the tip within a collapsed urinary bladder, limiting evaluation  No acute intra-abdominal abnormality  Scattered colonic diverticulosis with no inflammatory changes present to suggest acute diverticulitis  Cholelithiasis with no pericholecystic inflammatory change  Workstation performed: [id number]            Thank you for allowing me to participate in this patients care  Please do not hesitate to call with any additional questions    BLAIR Ravi

## 2021-08-12 NOTE — UTILIZATION REVIEW
Initial Clinical Review    OBS order 8/12 0207 converted to IP on 8/12 @ 1202 for continued treatment of sepsis sec to UTI     Level of Care Med Surg    Estimated length of stay More than 2 Midnights    Certification I certify that inpatient services are medically necessary for this patient for a duration of greater than two midnights  See H&P and MD Progress Notes for additional information about the patient's course of treatment  08/12/21 1203  Inpatient Admission Once      08/12/21 1202   08/12/21 0207  Place in Observation Once      08/12/21 0207       ED Arrival Information     Expected Arrival Acuity    - 8/11/2021 22:41 Emergent         Means of arrival Escorted by Service Admission type    Ambulance [facility]         Arrival complaint            Chief Complaint   Patient presents with    Post-op Problem     pt had surgery done on thursday to his bladder and prostate where a marley was placed  Pts family called today due to AMS  Pt is A&O X4  Redness noted to both flanks bilaterally  Pt was given 650 mg tylenol for fever 103 and also given zofran to nausea  200cc of fluid given as well  Initial Presentation: 81 yo male to ED from home  Via EMS w/ lower UTI sx sec to enlarged prostate , who underwent recent TURP and cystolitholapaxy who presents with acute onset confusion, rigors, and urinary symptoms  Admitted OBS status w/ sepsis pt w/ confusion , rigors, chills, tachycardia  , tachypnea , leukocytosis , lactic acidosis , and hyperthermia  Plan to f/u bld and ua cx , trend cbc and ua cx , cont iv cefepime  Lactic acidosis improved w/ IVF   8/12 Urology Consult   history of parkinsonism, BPH, chronic urinary retention, bladder calculi, status post TURP with cystolitholapaxy and UTI  plan medical optimization , sx mngt , empiric abx   narrow abx per sensitivities   Maintain marley , monitor wbc and fever  8/13 IM Note   Cont IV cefepime , trend cbc and fever   Cont IVF   F u sensitivities   ua cx growing g neg rods  Mental status at baseline    8/13 Urology Note   BPH , UTI - cont abx and adjust per sensitives , cont IVF , creat 1 05, maintain marley        ED Triage Vitals   Temperature Pulse Respirations Blood Pressure SpO2   08/11/21 2245 08/11/21 2245 08/11/21 2245 08/11/21 2245 08/11/21 2245   100 5 °F (38 1 °C) (!) 106 20 (!) 182/81 95 %      Temp Source Heart Rate Source Patient Position - Orthostatic VS BP Location FiO2 (%)   08/11/21 2245 08/11/21 2245 08/11/21 2245 08/11/21 2245 --   Oral Monitor Lying Right arm       Pain Score       08/12/21 0800       No Pain          Wt Readings from Last 1 Encounters:   08/11/21 83 kg (182 lb 15 7 oz)     Additional Vital Signs:   08/13/21 0930  --  --  --  --  --  --  None (Room air)  --   08/13/21 07:08:09  97 4 °F (36 3 °C)Abnormal   80  --  132/75  94  98 %  --  --   08/12/21 22:18:26  98 °F (36 7 °C)  85  20  134/73  93  100 %  --  --   08/12/21 2130  --  --  --  --  --  --  None (Room air)  --   08/12/21 19:47:01  98 6 °F (37 °C)  89  --  136/71  93  95 %  --  --   08/12/21 1630  --  81  17  129/65  91  95 %  --  --   08/12/21 1600  --  80  20  136/64  92  94 %  --  --   08/12/21 1430  --  76  17  124/59  85  96 %  --  --   08/12/21 1400  --  78  18  124/61  88  96 %  --  --   08/12/21 1100  --  69  18  114/61  82  97 %  --  --   08/12/21 0900  --  87  20  147/63  90  95 %  --  --   08/12/21 0800  97 7 °F (36 5 °C)  78  20  111/59  82  96 %  None (Room air         08/12/21 0700  --  83  17  104/58  78  95 %  --  --   08/12/21 0600  --  74  13  106/57  74  99 %  --  --   08/12/21 0530  --  82  19  105/58  76  96 %  --  --   08/12/21 0500  --  81  15  98/56  73  95 %  --  --   08/12/21 0430  --  82  13  99/50  68  97 %  --  --   08/12/21 0300  98 3 °F (36 8 °C)  87  15  101/50  71  96 %  --  --   08/12/21 0230  --  89  18  101/54  73  95 %  --  --   08/12/21 0130  --  90  13  116/58  82  95 %  --  --   08/12/21 0100  -- 91  22  118/58  81  95 %  --  --   08/12/21 0000  --  102  25Abnormal   129/61  87  93 %  --  --   08/11/21 2300  --  104  16  154/71  102  94 %           Pertinent Labs/Diagnostic Test Results:   8/12 CT abd   Postoperative changes of recent TURP procedure with a Aviles catheter with the tip within a collapsed urinary bladder, limiting evaluation        No acute intra-abdominal abnormality        Scattered colonic diverticulosis with no inflammatory changes present to suggest acute diverticulitis        Cholelithiasis with no pericholecystic inflammatory change         Results from last 7 days   Lab Units 08/12/21  0013   SARS-COV-2  Negative     Results from last 7 days   Lab Units 08/13/21  0553 08/12/21  0527 08/11/21  2316   WBC Thousand/uL 18 09* 28 53* 16 81*   HEMOGLOBIN g/dL 12 7 11 5* 13 1   HEMATOCRIT % 37 1 33 5* 38 3   PLATELETS Thousands/uL 202 194 212   NEUTROS ABS Thousands/µL  --   --  15 82*     Results from last 7 days   Lab Units 08/13/21  0553 08/12/21  0527 08/11/21  2316   SODIUM mmol/L 138 139 140   POTASSIUM mmol/L 4 2 4 2 3 9   CHLORIDE mmol/L 105 107 104   CO2 mmol/L 22 23 26   ANION GAP mmol/L 11 9 10   BUN mg/dL 18 21 19   CREATININE mg/dL 1 05 1 07 1 17   EGFR ml/min/1 73sq m 65 63 57   CALCIUM mg/dL 8 6 7 8* 8 6     Results from last 7 days   Lab Units 08/11/21  2316   AST U/L 16   ALT U/L 25   ALK PHOS U/L 63   TOTAL PROTEIN g/dL 6 6   ALBUMIN g/dL 3 6   TOTAL BILIRUBIN mg/dL 0 60     Results from last 7 days   Lab Units 08/13/21  0553 08/12/21  0527 08/11/21  2316   GLUCOSE RANDOM mg/dL 119 107 117     Results from last 7 days   Lab Units 08/11/21  2316   PH ARDEN  7 431*   PCO2 ARDEN mm Hg 38 1*   PO2 ARDEN mm Hg 39 2   HCO3 ARDEN mmol/L 24 8   BASE EXC ARDEN mmol/L 0 7   O2 CONTENT ARDEN ml/dL 14 9   O2 HGB, VENOUS % 75 1       Results from last 7 days   Lab Units 08/11/21  2316   TROPONIN I ng/mL <0 02     Results from last 7 days   Lab Units 08/11/21  2316   PROTIME seconds 13 5   INR  1 08 PTT seconds 28     Results from last 7 days   Lab Units 08/11/21  2316   PROCALCITONIN ng/ml 0 13     Results from last 7 days   Lab Units 08/12/21  0152 08/11/21  2316   LACTIC ACID mmol/L 1 5 2 6*       Results from last 7 days   Lab Units 08/12/21  0004   CLARITY UA  Cloudy   COLOR UA  Yellow   SPEC GRAV UA  1 025   PH UA  7 0   GLUCOSE UA mg/dl Negative   KETONES UA mg/dl Negative   BLOOD UA  Moderate*   PROTEIN UA mg/dl 100 (2+)*   NITRITE UA  Positive*   BILIRUBIN UA  Negative   UROBILINOGEN UA E U /dl 2 0*   LEUKOCYTES UA  Moderate*   WBC UA /hpf 20-30*   RBC UA /hpf 20-30*   BACTERIA UA /hpf Occasional   EPITHELIAL CELLS WET PREP /hpf None Seen     ED Treatment:   Medication Administration from 08/11/2021 2240 to 08/12/2021 0741       Date/Time Order Dose Route Action     08/11/2021 2300 ondansetron (FOR EMS ONLY) (ZOFRAN) 4 mg/2 mL injection 4 mg 0 mg Does not apply Given to EMS     08/11/2021 2309 acetaminophen (FOR EMS ONLY) (TYLENOL) oral suspension 650 mg 0 mg Does not apply Given to EMS     08/12/2021 0005 cefepime (MAXIPIME) 2,000 mg in dextrose 5 % 50 mL IVPB 2,000 mg Intravenous New Bag     08/12/2021 0047 vancomycin (VANCOCIN) 1,750 mg in sodium chloride 0 9 % 500 mL IVPB 1,750 mg Intravenous New Bag     08/12/2021 0010 sodium chloride 0 9 % bolus 1,000 mL 1,000 mL Intravenous New Bag     08/12/2021 0526 sodium chloride 0 9 % infusion 75 mL/hr Intravenous New Bag        Past Medical History:   Diagnosis Date    Arthritis     Bladder stones     Hypertension     [facility] [address] [facility]      Present on Admission:   [facility]   Enlarged prostate with lower urinary tract symptoms (LUTS)   Urinary tract infection   Lactic acidosis   Parkinson disease ([facility] [address] [facility]      Admitting Diagnosis: Urinary tract infection [N39 0]  Post-operative complication [C49  9XXA]  Sepsis ([facility] [address] ) [A41 9]  Age/Sex: 82 y o  male  Admission Orders:  Scheduled Medications:  cefepime, 2,000 mg, Intravenous, Q12H  docusate sodium, 100 mg, Oral, BID  enoxaparin, 40 mg, Subcutaneous, Daily  losartan, 25 mg, Oral, Daily      Continuous IV Infusions:  sodium chloride, 75 mL/hr, Intravenous, Continuous      PRN Meds:  acetaminophen, 650 mg, Oral, Q6H PRN  aluminum-magnesium hydroxide-simethicone, 30 mL, Oral, Q6H PRN  ondansetron, 4 mg, Intravenous, Q6H PRN  polyethylene glycol, 17 g, Oral, Daily PRN    Tele       IP CONSULT TO UROLOGY    Network Utilization Review Department  ATTENTION: Please call with any questions or concerns to [phone] and carefully listen to the prompts so that you are directed to the right person  All voicemails are confidential   Fitz Manuel all requests for admission clinical reviews, approved or denied determinations and any other requests to dedicated fax number below belonging to the campus where the patient is receiving treatment   List of dedicated fax numbers for the Facilities:  [address] DENIALS (Administrative/Medical Necessity) [phone]   [address] (Maternity/NICU/Pediatrics) [phone]   [address] [place] [address] [address] [place] [address] [id number] [place] [address] [address] [facility] [place] [address] [phone]

## 2021-08-13 PROBLEM — G93.41 METABOLIC ENCEPHALOPATHY: Status: ACTIVE | Noted: 2021-08-13

## 2021-08-13 LAB
ANION GAP SERPL CALCULATED.3IONS-SCNC: 11 MMOL/L (ref 4–13)
BUN SERPL-MCNC: 18 MG/DL (ref 5–25)
CALCIUM SERPL-MCNC: 8.6 MG/DL (ref 8.3–10.1)
CHLORIDE SERPL-SCNC: 105 MMOL/L (ref 100–108)
CO2 SERPL-SCNC: 22 MMOL/L (ref 21–32)
COLOR STONE: NORMAL
COMMENT-STONE3: NORMAL
COMPOSITION: NORMAL
CREAT SERPL-MCNC: 1.05 MG/DL (ref 0.6–1.3)
ERYTHROCYTE [DISTWIDTH] IN BLOOD BY AUTOMATED COUNT: 12.5 % (ref 11.6–15.1)
GFR SERPL CREATININE-BSD FRML MDRD: 65 ML/MIN/1.73SQ M
GLUCOSE SERPL-MCNC: 119 MG/DL (ref 65–140)
HCT VFR BLD AUTO: 37.1 % (ref 36.5–49.3)
HGB BLD-MCNC: 12.7 G/DL (ref 12–17)
LABORATORY COMMENT REPORT: NORMAL
MCH RBC QN AUTO: 32.6 PG (ref 26.8–34.3)
MCHC RBC AUTO-ENTMCNC: 34.2 G/DL (ref 31.4–37.4)
MCV RBC AUTO: 95 FL (ref 82–98)
PHOTO: NORMAL
PLATELET # BLD AUTO: 202 THOUSANDS/UL (ref 149–390)
PMV BLD AUTO: 10.9 FL (ref 8.9–12.7)
POTASSIUM SERPL-SCNC: 4.2 MMOL/L (ref 3.5–5.3)
PROCALCITONIN SERPL-MCNC: 7.96 NG/ML
RBC # BLD AUTO: 3.9 MILLION/UL (ref 3.88–5.62)
SIZE STONE: NORMAL MM
SODIUM SERPL-SCNC: 138 MMOL/L (ref 136–145)
SPEC SOURCE SUBJ: NORMAL
STONE ANALYSIS-IMP: NORMAL
URATE MFR STONE: 100 %
WBC # BLD AUTO: 18.09 THOUSAND/UL (ref 4.31–10.16)
WT STONE: NORMAL MG

## 2021-08-13 PROCEDURE — 99232 SBSQ HOSP IP/OBS MODERATE 35: CPT | Performed by: INTERNAL MEDICINE

## 2021-08-13 PROCEDURE — 97167 OT EVAL HIGH COMPLEX 60 MIN: CPT

## 2021-08-13 PROCEDURE — 84145 PROCALCITONIN (PCT): CPT | Performed by: EMERGENCY MEDICINE

## 2021-08-13 PROCEDURE — 85027 COMPLETE CBC AUTOMATED: CPT | Performed by: PHYSICIAN ASSISTANT

## 2021-08-13 PROCEDURE — NC001 PR NO CHARGE: Performed by: PHYSICIAN ASSISTANT

## 2021-08-13 PROCEDURE — 97163 PT EVAL HIGH COMPLEX 45 MIN: CPT

## 2021-08-13 PROCEDURE — 80048 BASIC METABOLIC PNL TOTAL CA: CPT | Performed by: PHYSICIAN ASSISTANT

## 2021-08-13 RX ADMIN — ENOXAPARIN SODIUM 40 MG: 40 INJECTION SUBCUTANEOUS at 09:28

## 2021-08-13 RX ADMIN — CEFEPIME HYDROCHLORIDE 2000 MG: 2 INJECTION, POWDER, FOR SOLUTION INTRAVENOUS at 12:47

## 2021-08-13 RX ADMIN — CEFEPIME HYDROCHLORIDE 2000 MG: 2 INJECTION, POWDER, FOR SOLUTION INTRAVENOUS at 00:29

## 2021-08-13 RX ADMIN — LOSARTAN POTASSIUM 25 MG: 25 TABLET, FILM COATED ORAL at 09:28

## 2021-08-13 RX ADMIN — DOCUSATE SODIUM 100 MG: 100 CAPSULE, LIQUID FILLED ORAL at 09:28

## 2021-08-13 NOTE — PROGRESS NOTES
Progress Note - Urology  Hussain Holly 1935, 82 y o  male MRN: [id number]    Unit/Bed#: -01 Encounter: [id number]    Assessment and Plan:  1  BPH   2  Bladder calculi  3  S/p TURP with cystolitholapaxy  4  UTI    - Continue antibiotics and adjust per sensitivities  - Continue symptom management  - Continue fluids  - Afebrile  - Creatinine 1 05  - Maintain marley at this time    Subjective:   HPI: POD #7 cystolithiotomy and TURP  Doing well overall  Denies fevers, chills, nausea, vomiting  Sitting comfortably in chair, in no acute distress  Clear yellow urine draining into bag  Review of Systems   Constitutional: Negative for activity change, appetite change, chills and fever  HENT: Negative for congestion and trouble swallowing  Respiratory: Negative for cough and shortness of breath  Cardiovascular: Negative for chest pain, palpitations and leg swelling  Gastrointestinal: Negative for abdominal pain, constipation, diarrhea, nausea and vomiting  Genitourinary: Negative for flank pain and hematuria  Musculoskeletal: Negative for back pain and gait problem  Skin: Negative for wound  Allergic/Immunologic: Negative for immunocompromised state  Neurological: Negative for dizziness and syncope  Hematological: Does not bruise/bleed easily  Psychiatric/Behavioral: Negative for confusion  All other systems reviewed and are negative  Objective:  Nursing Rounds:   Vitals: Blood pressure 132/75, pulse 80, temperature (!) 97 4 °F (36 3 °C), resp  rate 20, height 5' 11" (1 803 m), weight 83 kg (182 lb 15 7 oz), SpO2 98 %  ,Body mass index is 25 52 kg/m²  Physical Exam  Constitutional:       Appearance: Normal appearance  HENT:      Head: Normocephalic  Pulmonary:      Effort: Pulmonary effort is normal    Musculoskeletal:      Cervical back: Normal range of motion  Skin:     General: Skin is warm and dry  Neurological:      General: No focal deficit present        Mental Status: He is alert and oriented to person, place, and time  Psychiatric:         Mood and Affect: Mood normal          Behavior: Behavior normal          Thought Content: Thought content normal          Judgment: Judgment normal          Imaging:    Imaging reviewed - both report and images personally reviewed  Labs:  Recent Labs     08/11/21 2316 08/12/21  0527 08/13/21  0553   WBC 16 81* 28 53* 18 09*     Recent Labs     08/11/21  2316 08/12/21  0527 08/13/21  0553   HGB 13 1 11 5* 12 7     Recent Labs     08/11/21 2316 08/12/21  0527 08/13/21  0553   HCT 38 3 33 5* 37 1     Recent Labs     08/11/21 2316 08/12/21  0527 08/13/21  0553   CREATININE 1 17 1 07 1 05         History:  Past Medical History:   Diagnosis Date    Arthritis     Bladder stones     Hypertension     Parkinson disease ([facility] [address] )      Social History     Socioeconomic History    Marital status: /Civil Union     Spouse name: None    Number of children: None    Years of education: None    Highest education level: None   Occupational History    None   Tobacco Use    Smoking status: Never Smoker    Smokeless tobacco: Never Used   Vaping Use    Vaping Use: Never used   Substance and Sexual Activity    Alcohol use: Never    Drug use: Never    Sexual activity: Yes   Other Topics Concern    None   Social History Narrative    None     Social Determinants of Health     Financial Resource Strain:     Difficulty of Paying Living Expenses:    Food Insecurity:     Worried About Running Out of Food in the Last Year:     Ran Out of Food in the Last Year:    Transportation Needs:     Lack of Transportation (Medical):      Lack of Transportation (Non-Medical):    Physical Activity:     Days of Exercise per Week:     Minutes of Exercise per Session:    Stress:     Feeling of Stress :    Social Connections:     Frequency of Communication with Friends and Family:     Frequency of Social Gatherings with Friends and Family:     Attends Baptism Services:     Active Member of Clubs or Organizations:     Attends Club or Organization Meetings:     Marital Status:    Intimate Partner Violence:     Fear of Current or Ex-Partner:     Emotionally Abused:     Physically Abused:     Sexually Abused:      Past Surgical History:   Procedure Laterality Date    COLONOSCOPY      FOOT SURGERY      MD OPEN BLADDER,REMV CALCULUS N/A 8/5/2021    Procedure: CYSTOLITHOTOMY OPEN;  Surgeon: Kristy Mccormack MD;  Location: [facility];  Service: Urology    MD TRANSURETHRAL ELEC-SURG PROSTATECTOM N/A 8/5/2021    Procedure: TRANSURETHRAL RESECTION OF PROSTATE (TURP);   Surgeon: Kristy Mccormack MD;  [facility]: [facility];  Service: Urology    TONSILLECTOMY       Family History   Problem Relation Age of Onset    No Known Problems Mother     Lung cancer Father     No Known Problems Sister     No Known Problems Sister     Lung cancer Sister        Cameron [place]  Date: 8/13/2021 Time: 10:26 AM

## 2021-08-13 NOTE — OCCUPATIONAL THERAPY NOTE
Occupational Therapy Evaluation Note        Patient Name: Eric Morris  VSMFA'H Date: 8/13/2021 08/13/21 1318   OT Last Visit   OT Visit Date 08/13/21   Note Type   Note type Evaluation   Restrictions/Precautions   Weight Bearing Precautions Per Order No   Braces or Orthoses Other (Comment)  (none per pt)   Other Precautions Chair Alarm; Bed Alarm;Multiple lines; Fall Risk   Pain Assessment   Pain Assessment Tool 0-10   Pain Score No Pain   Home Living   Type of [facility] One level;Performs ADLs on one level;Stairs to enter with rails  (1 ANTOINETTE)   Bathroom Shower/Tub Tub/shower unit   [employer] Raised   Bathroom Equipment Tub transfer bench;Grab bars in shower   [facility]   Additional Comments Patient ambulatory with no AD at baseline   Prior Function   Level of [place] Independent with ADLs and functional mobility; Needs assistance with IADLs   Lives With Spouse   Receives Help From Family   ADL Assistance Independent   IADLs Needs assistance   Falls in the last 6 months 0   Vocational Retired   Lifestyle   Autonomy Per patient report, he lives with his spouse in a one-story home with 1 ANTOINETTE  At baseline, patient reports that he is independent in ADLs and receives assistance with IADLs  Patient reports ambulatory with no AD at baseline     Reciprocal Relationships Supportive spouse/family   Service to Others Retired- [employer]   Intrinsic Gratification Watching grandchildren/great grandchildren   Psychosocial   Psychosocial (WDL) WDL   ADL   Eating Assistance 6  Modified independent   Grooming Assistance 5  Supervision/Setup   UB Bathing Assistance 5  Supervision/Setup   LB Bathing Assistance 4  Minimal Assistance   UB Dressing Assistance 5  Supervision/Setup   LB Dressing Assistance 4  [address] [address]  [address] [facility] 4  Minimal Assistance   Additional Comments ADL assist levels based on pt's functional performance during OT evaluation   Bed Mobility   Supine to Sit 4  Minimal assistance   Additional items Assist x 1;HOB elevated; Increased time required;Verbal cues   Sit to Supine 4  Minimal assistance   Additional items Assist x 1;HOB elevated; Bedrails; Increased time required;Verbal cues;LE management   Additional Comments Patient received lying supine in bed upon OT arrival; at end of session: pt returned lying supine in bed w/ all needs within reach and bed alarm activated   Transfers   Sit to Stand 4  Minimal assistance   Additional items Assist x 1; Increased time required;Verbal cues   Stand to Sit 4  Minimal assistance   Additional items Assist x 1; Increased time required;Verbal cues   Additional Comments Performed functional transfers using no AD   Functional Mobility   Functional Mobility 4  Minimal assistance   Additional Comments x1; ambulation within pt room and hallway with no overt LOB or SOB   Additional items   (no AD)   Balance   Static Sitting Fair   Dynamic Sitting Fair -   Static Standing Fair -   Dynamic Standing Poor +   Ambulatory Poor +   Activity Tolerance   Activity Tolerance Patient tolerated treatment well   Medical Staff Made Aware PT [address] Roberto confirmed pt appropriate for therapy and made aware of session outcomes   RUE Assessment   RUE Assessment WFL  (AROM WFL; strength 4+/5 proximally & 4-/5 distally)   LUE Assessment   LUE Assessment WFL  (AROM WFL; strength 4+/5 proximally & 4-/5 distally)   Hand Function   Gross Motor Coordination Impaired   Fine Motor Coordination   (Further assessment required)   Sensation   Light Touch No apparent deficits  (BUEs)   Vision-Basic Assessment   Current Vision Wears glasses only for reading   Cognition   Overall Cognitive Status   (Questionable impairment- to be further assessed)   Arousal/Participation Alert; Responsive; Cooperative   Attention Within functional limits   Orientation Level Oriented to person;Oriented to place; Disoriented to situation  (Oriented to month only, "2001")   Memory Decreased short term memory;Decreased recall of recent events   Following Commands Follows multistep commands without difficulty   Comments Patient agreeable to OT evaluation   Assessment   Limitation Decreased ADL status; Decreased UE strength;Decreased endurance;Decreased self-care trans;Decreased high-level ADLs   Prognosis Good   Assessment Patient is a 82 y o  male seen for OT evaluation s/p admit to [address] [facility] on 8/11/2021 w/Sepsis [facility])  Commorbidities affecting patient's functional performance at time of assessment include: metabolic encephalopathy, Parkinson disease, lactic acidosis, and UTI  Patient  has a past medical history of Arthritis, Bladder stones, Hypertension, and Parkinson disease ([facility] [address] )  Orders placed for OT evaluation and treatment  Performed at least two patient identifiers during session including name and wristband  Prior to admission, patient was living with his spouse in a one-story home with 1 ANTOINETTE  At baseline, patient reports that he is independent in ADLs and receives assistance with IADLs from spouse  Personal factors affecting patient at time of initial evaluation include: difficulty performing ADLs and difficulty performing IADLs  Upon evaluation, patient requires supervision and set up assist for UB ADLs, minimal assist for LB ADLs, transfers and functional ambulation in room and bathroom with minimal  assist, with no AD  Patient is alert, oriented to name,, oriented to place,, disoriented to time, and disoriented to situation,   Occupational performance is affected by the following deficits: orientation, decreased muscle strength, impaired gross motor coordination, dynamic sit/ stand balance deficit with poor standing tolerance time for self care and functional mobility, decreased activity tolerance, impaired memory and postural control and postural alignment deficit, requiring external assistance to complete transitional movements  Patient to benefit from continued Occupational Therapy treatment while in the hospital to address deficits as defined above and maximize level of functional independence with ADLs and functional mobility  Occupational Performance areas to address include: grooming , bathing/ shower, dressing, toilet hygiene, transfer to all surfaces, functional mobility, IADLs: safety procedures, Leisure Participation and Social participation  From OT standpoint, recommendation at time of d/c would be Home with family support  Goals   Patient Goals to return home and get back to moving around   Plan   Treatment Interventions ADL retraining;Functional transfer training;UE strengthening/ROM; Endurance training;Cognitive reorientation;Patient/family training; Compensatory technique education;Continued evaluation; Activityengagement;Equipment evaluation/education   Goal Expiration Date 08/20/21   OT Treatment Day 0   OT Frequency 2-3x/wk   Recommendation   OT Discharge Recommendation No rehabilitation needs   Additional Comments  The patient's raw score on the AM-PAC Daily Activity inpatient short form is 20, standardized score is 42 03, greater than 39 4  Patients at this level are likely to benefit from discharge to home  Please refer to the recommendation of the Occupational Therapist for safe discharge planning     AM-PAC Daily Activity Inpatient   Lower Body Dressing 3   Bathing 3   Toileting 3   Upper Body Dressing 3   Grooming 4   Eating 4   Daily Activity Raw Score 20   Daily Activity Standardized Score (Calc for Raw Score >=11) 42 03   AM-PAC Applied Cognition Inpatient   Following a Speech/Presentation 4   Understanding Ordinary Conversation 4   Taking Medications 2   Remembering Where Things Are Placed or Put Away 3   Remembering List of 4-5 Errands 3   Taking Care of Complicated Tasks 2   Applied Cognition Raw Score 18   Applied Cognition Standardized Score 38 07 Barthel Index   Feeding 10   Bathing 0   Grooming Score 5   Dressing Score 5   Bladder Score 0   Bowels Score 10   Toilet Use Score 5   Transfers (Bed/Chair) Score 10   Mobility (Level Surface) Score 10   Stairs Score 5  (pt performed high marching)   Barthel Index Score 60   Modified [place] Scale   Modified Roge Scale 4     Occupational Therapy Goals to be completed in 5-7 Days:    1 - Patient will verbalize and demonstrate use of energy conservation/ deep breathing technique and work simplification skills during functional activity with no verbal cues  2 - Patient will verbalize and demonstrate good body mechanics and joint protection techniques during  ADLs/ IADLs with no verbal cues  3 - Patient will increase OOB/ sitting tolerance to 2-4 hours per day for increased participation in self care and leisure tasks with no s/s of exertion  4 - Patient will increase standing tolerance time to 10 minutes with unilateral UE support to complete sink level ADLs@ mod I level  5 - Patient will increase sitting tolerance at edge of bed to 30 minutes to complete UB ADLs @ set up assist level  6 - Patient will transfer bed to Chair / toilet at Mod I assist level with AD as indicated  7 - Patient will complete UB ADLs with Mod I assist      8 - Patient will complete LB ADLs with supervision/setup assist with the use of adaptive equipment as indicated  9 - Patient will complete toileting hygiene with Mod I assist/ supervision for thoroughness  8 - Patient/ Family will demonstrate competency with UE Home Exercise Program       11- Pt will attend to continued cognitive assessment 100% of the time in order to provide most appropriate recommendations for d/c plans  12- Pt will improve static and dynamic standing balance to fair to increase safety and independence during functional transfers and ADL and decrease risk for falls      Daniela Dover, OTR/L

## 2021-08-13 NOTE — UTILIZATION REVIEW
Inpatient Admission Authorization Request   NOTIFICATION OF INPATIENT ADMISSION/INPATIENT AUTHORIZATION REQUEST   SERVICING FACILITY:   [address] [place] [address]  Tax ID: [id number]  NPI: [id number]  Place of Service: Inpatient [address] [facility]  [address]  Place of Service Code: 24     ATTENDING PROVIDER:  Attending Name and NPI#: Tk Lacie, Caterina Bach Georgi [[id number]]  Address: [address] [place] [address]  Phone: [phone]     UTILIZATION REVIEW CONTACT:  [facility] Utilization   Network Utilization Review Department  Phone: [phone]  Fax [phone]  Email: [email or web address]     PHYSICIAN ADVISORY SERVICES:  FOR [id number] REVIEW - MEDICAL NECESSITY DENIAL  Phone: [phone]  Fax: [phone]  Email: [email or web address]     TYPE OF REQUEST:  Inpatient Status     ADMISSION INFORMATION:  ADMISSION DATE/TIME: 8/12/21 12:02 PM  PATIENT DIAGNOSIS CODE/DESCRIPTION:  Urinary tract infection [N39 0]  Post-operative complication [U50  9XXA]  Sepsis ([address] ) [A41 9]  DISCHARGE DATE/TIME: No discharge date for patient encounter  DISCHARGE DISPOSITION (IF DISCHARGED): Home/Self Care     IMPORTANT INFORMATION:  Please contact the [facility]ner directly with any questions or concerns regarding this request  Department voicemails are confidential     Send requests for admission clinical reviews, concurrent reviews, approvals, and administrative denials due to lack of clinical to fax [phone]

## 2021-08-13 NOTE — PLAN OF CARE
Problem: PHYSICAL THERAPY ADULT  Goal: Performs mobility at highest level of function for planned discharge setting  See evaluation for individualized goals  Description: Treatment/Interventions: Functional transfer training, LE strengthening/ROM, Elevations, Therapeutic exercise, Endurance training, Patient/family training, Bed mobility, Gait training, Spoke to nursing, OT, Family          See flowsheet documentation for full assessment, interventions and recommendations  Note: Prognosis: Good  Problem List: Decreased strength, Decreased endurance, Impaired balance, Decreased mobility, Decreased coordination  Assessment: Pt is 82year old male seen for PT evaluation s/p admit to [facility] on 8/11/2021 with Sepsis ([facility] [address] )  PT consulted to assess pt's functional mobility and d/c needs  Order placed for PT eval and tx, with up and OOB as tolerated order  Comorbidities affecting pt's physical performance at time of assessment include enlarged prostate with lower urinary tract symptoms, UTI, lactic acidosis, parkinson's disease, and metabolic encephalopathy  PTA, pt was independent with all functional mobility without an AD  Personal factors affecting pt at time of IE include step to enter home, inability to navigate community distances, inability to navigate level surfaces without external assistance, unable to perform dynamic tasks in community, and inability to perform IADLs  Please find objective findings from PT assessment regarding body systems outlined above with impairments and limitations including weakness, impaired balance, decreased endurance, impaired coordination, gait deviations, decreased activity tolerance, decreased functional mobility tolerance, and fall risk  The following objective measures performed on IE also reveal limitations: Barthel Index: 60/100 and Modified Roge: 4 (moderate/severe disability)   Pt's clinical presentation is currently unstable/unpredictable seen in pt's presentation of need for ongoing medical management/monitoring, pt is a fall risk, and pt requires cues/assist for safety with functional mobility  Pt to benefit from continued PT tx to address deficits as defined above and maximize level of functional independent mobility and consistency  From PT/mobility standpoint, recommendation at time of d/c would be home with family support and outpatient PT pending progress in order to facilitate return to PLOF  Barriers to Discharge: Other (Comment) (decline in functional status)    PT Discharge Recommendation: Home with outpatient rehabilitation    See flowsheet documentation for full assessment

## 2021-08-13 NOTE — PHYSICAL THERAPY NOTE
Physical Therapy Evaluation     Patient's Name: Peg Sandhoff    Admitting Diagnosis  Urinary tract infection [N39 0]  Post-operative complication [J95  9XXA]  Sepsis ([facility] ) [A41 9]    Problem List  Patient Active Problem List   Diagnosis    Bladder stones    Gross hematuria    Enlarged prostate with lower urinary tract symptoms (LUTS)    Encounter to discuss test results    Sepsis ([facility] )    Urinary tract infection    Lactic acidosis    [facility]    Metabolic encephalopathy     Past Medical History  Past Medical History:   Diagnosis Date    Arthritis     Bladder stones     Hypertension     Parkinson disease ([facility] )      Past Surgical History  Past Surgical History:   Procedure Laterality Date    COLONOSCOPY      FOOT SURGERY      VA OPEN BLADDER,REMV CALCULUS N/A 8/5/2021    Procedure: CYSTOLITHOTOMY OPEN;  Surgeon: Briseida Fuller MD;  Location: [facility];  Service: Urology    VA TRANSURETHRAL ELEC-SURG PROSTATECTOM N/A 8/5/2021    Procedure: TRANSURETHRAL RESECTION OF PROSTATE (TURP); Surgeon: Briseida Fuller MD;  Location: [facility];  Service: Urology    TONSILLECTOMY        08/13/21 1303   PT Last Visit   PT Visit Date 08/13/21   Note Type   Note type Evaluation   Pain Assessment   Pain Assessment Tool 0-10   Pain Score No Pain   Home Living   Type of [address] [place] [address] One level;Stairs to enter with rails  (1 ANTOINETTE)   Bathroom Shower/Tub Tub/shower unit   Bathroom Toilet Raised   Bathroom Equipment Tub transfer bench;Grab bars in shower   [address]   Additional Comments Pt ambulates without an AD     Prior Function   Level of [place] Independent with ADLs and functional mobility   Lives With Spouse   Receives Help From Family   ADL Assistance Independent   IADLs Needs assistance   Falls in the last 6 months 0   Vocational Retired   Restrictions/Precautions   [facility] Bearing Precautions Per Order No   Braces or Orthoses Other (Comment)  (none per patient)   Other Precautions Chair Alarm; Bed Alarm;Multiple lines; Fall Risk   General   Family/Caregiver Present Yes  (pt's wife)   Cognition   Overall Cognitive Status   (questionable)   Arousal/Participation Alert   Orientation Level Oriented to person;Oriented to place; Disoriented to situation  (oriented to month only)   Memory Decreased recall of recent events;Decreased short term memory   Following Commands Follows multistep commands without difficulty   Comments Pt agreeable to PT    RUE Assessment   RUE Assessment   (defer to OT assessment)   LUE Assessment   LUE Assessment   (defer to OT assessment)   RLE Assessment   RLE Assessment X   Strength RLE   RLE Overall Strength 4-/5   LLE Assessment   LLE Assessment X   Strength LLE   LLE Overall Strength 4-/5   Light Touch   RLE Light Touch Grossly intact   LLE Light Touch Grossly intact   Bed Mobility   Supine to Sit 4  Minimal assistance   Additional items Assist x 1;HOB elevated; Bedrails; Increased time required;Verbal cues;LE management   Sit to Supine 4  Minimal assistance   Additional items Assist x 1;HOB elevated; Bedrails; Increased time required;Verbal cues;LE management   Transfers   Sit to Stand 4  Minimal assistance   Additional items Assist x 1; Increased time required;Verbal cues   Stand to Sit 4  Minimal assistance   Additional items Assist x 1; Increased time required;Verbal cues   Ambulation/Elevation   Gait pattern Excessively slow; Step to;Short stride; Inconsistent pineda; Shuffling;Decreased foot clearance;Narrow BETTY   Gait Assistance 4  Minimal assist   Additional items Assist x 1;Verbal cues   Assistive Device None   Distance 60 feet   Stair Management Assistance   (pt performed high marching)   Balance   Static Sitting Fair +   Dynamic Sitting Fair   Static Standing Fair -   Dynamic Standing Poor +   Ambulatory Poor +   Endurance Deficit   Endurance Deficit Yes   Activity Tolerance   Activity Tolerance Patient tolerated treatment well   Medical Staff Made Aware OT Espiridion Arrow  Co-evaluation performed with OT secondary to complex medical condition of patient  PT/OT goals were addressed separately  Nurse Made Aware Discussed case with RN Emma Nava; post session pt was left supine in bed in NAD, all belongings within reach, +bed alarm   Assessment   Prognosis Good   Problem List Decreased strength;Decreased endurance; Impaired balance;Decreased mobility; Decreased coordination   Assessment Pt is 82year old male seen for PT evaluation s/p admit to [facility] on 8/11/2021 with Sepsis ([facility] )  PT consulted to assess pt's functional mobility and d/c needs  Order placed for PT eval and tx, with up and OOB as tolerated order  Comorbidities affecting pt's physical performance at time of assessment include enlarged prostate with lower urinary tract symptoms, UTI, lactic acidosis, parkinson's disease, and metabolic encephalopathy  PTA, pt was independent with all functional mobility without an AD  Personal factors affecting pt at time of IE include step to enter home, inability to navigate community distances, inability to navigate level surfaces without external assistance, unable to perform dynamic tasks in community, and inability to perform IADLs  Please find objective findings from PT assessment regarding body systems outlined above with impairments and limitations including weakness, impaired balance, decreased endurance, impaired coordination, gait deviations, decreased activity tolerance, decreased functional mobility tolerance, and fall risk  The following objective measures performed on IE also reveal limitations: Barthel Index: 60/100 and Modified [facility]: 4 (moderate/severe disability)  Pt's clinical presentation is currently unstable/unpredictable seen in pt's presentation of need for ongoing medical management/monitoring, pt is a fall risk, and pt requires cues/assist for safety with functional mobility   Pt to benefit from continued PT tx to address deficits as defined above and maximize level of functional independent mobility and consistency  From PT/mobility standpoint, recommendation at time of d/c would be home with family support and outpatient PT pending progress in order to facilitate return to PLOF  Barriers to Discharge Other (Comment)  (decline in functional status)   Goals   Patient Goals to return home   STG Expiration Date 08/23/21   Short Term Goal #1 In 7-10 days: Increase bilateral LE strength 1/2 grade to facilitate independent mobility, Perform all bed mobility tasks modified independent to decrease caregiver burden, Perform all transfers modified independent to improve independence, Ambulate > 150 ft  with least restrictive assistive device modified independent w/o LOB and w/ normalized gait pattern 100% of the time, Navigate 1 step modified independent with unilateral handrail to facilitate return to previous living environment and Increase all balance 1/2 grade to decrease risk for falls   Plan   Treatment/Interventions Functional transfer training;LE strengthening/ROM; Elevations; Therapeutic exercise; Endurance training;Patient/family training;Bed mobility;Gait training;Spoke to nursing;OT;Family   PT Frequency Other (Comment)  (3-5x/wk)   Recommendation   PT Discharge Recommendation Home with outpatient rehabilitation   AM-PAC Basic Mobility Inpatient   Turning in Bed Without Bedrails 3   Lying on Back to Sitting on Edge of Flat Bed 3   Moving Bed to Chair 3   Standing Up From Chair 3   Walk in Room 3   Climb 3-5 Stairs 3   Basic Mobility Inpatient Raw Score 18   Basic Mobility Standardized Score 41 05   Modified Edmond Scale   Modified Edmond Scale 4   Barthel Index   Feeding 10   Bathing 0   Grooming Score 5   Dressing Score 5   Bladder Score 0   Bowels Score 10   Toilet Use Score 5   Transfers (Bed/Chair) Score 10   Mobility (Level Surface) Score 10   Stairs Score 5  (pt performed high marching)   Barthel Index Score [facility], PT, DPT

## 2021-08-13 NOTE — PLAN OF CARE
Problem: PAIN - ADULT  Goal: Verbalizes/displays adequate comfort level or baseline comfort level  Description: Interventions:  - Encourage patient to monitor pain and request assistance  - Assess pain using appropriate pain scale  - Administer analgesics based on type and severity of pain and evaluate response  - Implement non-pharmacological measures as appropriate and evaluate response  - Consider cultural and social influences on pain and pain management  - Notify physician/advanced practitioner if interventions unsuccessful or patient reports new pain  Outcome: Progressing     Problem: INFECTION - ADULT  Goal: Absence or prevention of progression during hospitalization  Description: INTERVENTIONS:  - Assess and monitor for signs and symptoms of infection  - Monitor lab/diagnostic results  - Monitor all insertion sites, i e  indwelling lines, tubes, and drains  - Monitor endotracheal if appropriate and nasal secretions for changes in amount and color  - [place] appropriate cooling/warming therapies per order  - Administer medications as ordered  - Instruct and encourage patient and family to use good hand hygiene technique  - Identify and instruct in appropriate isolation precautions for identified infection/condition  Outcome: Progressing  Goal: Absence of fever/infection during neutropenic period  Description: INTERVENTIONS:  - Monitor WBC    Outcome: Progressing

## 2021-08-13 NOTE — ASSESSMENT & PLAN NOTE
Due to  Sepsis (post procedure) as evidenced by acute confusion as reported by wife, requiring treatment with IV Cefepime

## 2021-08-13 NOTE — PROGRESS NOTES
[facility]  Progress Note - Bernard Phlegm 1935, 82 y o  male MRN: [id number]  Unit/Bed#: -01 Encounter: [id number]  Primary Care Provider: Saeed Tang DO   Date and time admitted to hospital: 8/11/2021 70:07 PM    Metabolic encephalopathy  Assessment & Plan   Due to  Sepsis (post procedure) as evidenced by acute confusion as reported by wife, requiring treatment with IV Cefepime  Parkinson disease ([facility] )  Assessment & Plan  · Rasagiline (azilect) is non formulary, wife will need to bring in - day team to contact    Lactic acidosis  Assessment & Plan  · Present on admission, secondary to sepsis  · Resolved with IV fluid resuscitation    Urinary tract infection  Assessment & Plan  · Present on admission as evidenced by grossly (+) urinalysis, with recent GI manipulation  · See plan above, continue cefepime  · Urine culture growing g negative rods  · Follow-up sensitivities    * Sepsis ([facility] )  Assessment & Plan  · Patient presents with wife for acute onset of confusion, rigors and chills in the setting of recent  manipulation for TURP and cysto lithoplaxy 8/5/2021  · Urinalysis grossly (+), CBC reveals leukocytosis, lactic acidosis, tachycardia, tachypnea, hyperthermia  · Admit for urosepsis, agree with cefepime as initiated in the ER  · Follow-up on blood and urine cultures  · Trend CBC and fever curve  · Lactic acidosis already resolved with IV fluids, continue fluids      VTE Pharmacologic Prophylaxis:   Pharmacologic: Enoxaparin (Lovenox)  Mechanical VTE Prophylaxis in Place: Yes    Patient Centered Rounds: I have performed bedside rounds with nursing staff today  Discussions with Specialists or Other Care Team Provider: jacquelyn yousif    Education and Discussions with Family / Patient: pt    Time Spent for Care: 30 minutes  More than 50% of total time spent on counseling and coordination of care as described above      Current Length of Stay: 1 day(s)    Current Patient Status: Inpatient   Certification Statement: The patient will continue to require additional inpatient hospital stay due to see above    Discharge Plan: see above still requiring IV antibiotic    Code Status: Level 1 - Full Code      Subjective:   Acute complaints    Objective:     Vitals:   Temp (24hrs), Av °F (36 7 °C), Min:97 4 °F (36 3 °C), Max:98 6 °F (37 °C)    Temp:  [97 4 °F (36 3 °C)-98 6 °F (37 °C)] 97 4 °F (36 3 °C)  HR:  [69-89] 80  Resp:  [17-20] 20  BP: (114-136)/(59-75) 132/75  SpO2:  [94 %-100 %] 98 %  Body mass index is 25 52 kg/m²  Input and Output Summary (last 24 hours): Intake/Output Summary (Last 24 hours) at 2021 0901  Last data filed at 2021 0602  Gross per 24 hour   Intake 530 ml   Output 1750 ml   Net -1220 ml       Physical Exam:     Physical Exam  Constitutional:       General: He is not in acute distress  Appearance: He is well-developed  He is not diaphoretic  HENT:      Head: Normocephalic and atraumatic  Nose: Nose normal       Mouth/Throat:      Pharynx: No oropharyngeal exudate  Eyes:      General: No scleral icterus  Conjunctiva/sclera: Conjunctivae normal    Cardiovascular:      Rate and Rhythm: Normal rate and regular rhythm  Heart sounds: Normal heart sounds  No murmur heard  No friction rub  No gallop  Pulmonary:      Effort: Pulmonary effort is normal  No respiratory distress  Breath sounds: Normal breath sounds  No wheezing or rales  Chest:      Chest wall: No tenderness  Abdominal:      General: Bowel sounds are normal  There is no distension  Palpations: Abdomen is soft  Tenderness: There is no abdominal tenderness  There is no guarding  Musculoskeletal:         General: No tenderness or deformity  Normal range of motion  Cervical back: Normal range of motion and neck supple  Skin:     General: Skin is warm and dry  Findings: No erythema  Neurological:      Mental Status: He is alert  Mental status is at baseline  Additional Data:     Labs:    Results from last 7 days   Lab Units 08/13/21  0553 08/11/21  2316   WBC Thousand/uL 18 09* 16 81*   HEMOGLOBIN g/dL 12 7 13 1   HEMATOCRIT % 37 1 38 3   PLATELETS Thousands/uL 202 212   NEUTROS PCT %  --  94*   LYMPHS PCT %  --  2*   MONOS PCT %  --  3*   EOS PCT %  --  0     Results from last 7 days   Lab Units 08/13/21  0553 08/11/21  2316   SODIUM mmol/L 138 140   POTASSIUM mmol/L 4 2 3 9   CHLORIDE mmol/L 105 104   CO2 mmol/L 22 26   BUN mg/dL 18 19   CREATININE mg/dL 1 05 1 17   ANION GAP mmol/L 11 10   CALCIUM mg/dL 8 6 8 6   ALBUMIN g/dL  --  3 6   TOTAL BILIRUBIN mg/dL  --  0 60   ALK PHOS U/L  --  63   ALT U/L  --  25   AST U/L  --  16   GLUCOSE RANDOM mg/dL 119 117     Results from last 7 days   Lab Units 08/11/21  2316   INR  1 08             Results from last 7 days   Lab Units 08/12/21  0152 08/11/21  2316   LACTIC ACID mmol/L 1 5 2 6*   PROCALCITONIN ng/ml  --  0 13           * I Have Reviewed All Lab Data Listed Above  * Additional Pertinent Lab Tests Reviewed: All Labs Within Last 24 Hours Reviewed    Imaging:    Imaging Reports Reviewed Today Include: na  Imaging Personally Reviewed by Myself Includes:  na    Recent Cultures (last 7 days):     Results from last 7 days   Lab Units 08/12/21  0004 08/11/21  2316   BLOOD CULTURE  No Growth at 24 hrs  No Growth at 24 hrs     URINE CULTURE  >100,000 cfu/ml Gram Negative Abe*  --        Last 24 Hours Medication List:   Current Facility-Administered Medications   Medication Dose Route Frequency Provider Last Rate    acetaminophen  650 mg Oral Q6H PRN Skye Prince PA-C      aluminum-magnesium hydroxide-simethicone  30 mL Oral Q6H PRN Skye Prince PA-C      cefepime  2,000 mg Intravenous Q12H Hilda Garcia PA-C 2,000 mg (08/13/21 0029)    docusate sodium  100 mg Oral BID Hilda Garcia PA-C      enoxaparin  40 mg Subcutaneous Daily Hilda Garcia PA-C      losartan  25 mg Oral Daily Judy Rodgers PA-C      ondansetron  4 mg Intravenous Q6H PRN Hilda Garcia PA-C      polyethylene glycol  17 g Oral Daily PRN Judy Rodgers PA-C          Today, Patient Was Seen By: Margreta Landau, MD    ** Please Note: Dictation voice to text software may have been used in the creation of this document   **

## 2021-08-13 NOTE — ASSESSMENT & PLAN NOTE
· Present on admission as evidenced by grossly (+) urinalysis, with recent GI manipulation  · See plan above, continue cefepime  · Urine culture growing g negative rods  · Follow-up sensitivities

## 2021-08-13 NOTE — PLAN OF CARE
Problem: OCCUPATIONAL THERAPY ADULT  Goal: Performs self-care activities at highest level of function for planned discharge setting  See evaluation for individualized goals  Description: Treatment Interventions: ADL retraining, Functional transfer training, UE strengthening/ROM, Endurance training, Cognitive reorientation, Patient/family training, Compensatory technique education, Continued evaluation, Activityengagement, Equipment evaluation/education          See flowsheet documentation for full assessment, interventions and recommendations  Note: Limitation: Decreased ADL status, Decreased UE strength, Decreased endurance, Decreased self-care trans, Decreased high-level ADLs  Prognosis: Good  Assessment: Patient is a 82 y o  male seen for OT evaluation s/p admit to [address] [facility] on 8/11/2021 w/Sepsis [facility])  Commorbidities affecting patient's functional performance at time of assessment include: metabolic encephalopathy, Parkinson disease, lactic acidosis, and UTI  Patient  has a past medical history of Arthritis, Bladder stones, Hypertension, and Parkinson disease ([facility] )  Orders placed for OT evaluation and treatment  Performed at least two patient identifiers during session including name and wristband  Prior to admission, patient was living with his spouse in a one-story home with 1 ANTOINETTE  At baseline, patient reports that he is independent in ADLs and receives assistance with IADLs from spouse  Personal factors affecting patient at time of initial evaluation include: difficulty performing ADLs and difficulty performing IADLs  Upon evaluation, patient requires supervision and set up assist for UB ADLs, minimal assist for LB ADLs, transfers and functional ambulation in room and bathroom with minimal  assist, with no AD  Patient is alert, oriented to name,, oriented to place,, disoriented to time, and disoriented to situation,   Occupational performance is affected by the following deficits: orientation, decreased muscle strength, impaired gross motor coordination, dynamic sit/ stand balance deficit with poor standing tolerance time for self care and functional mobility, decreased activity tolerance, impaired memory and postural control and postural alignment deficit, requiring external assistance to complete transitional movements  Patient to benefit from continued Occupational Therapy treatment while in the hospital to address deficits as defined above and maximize level of functional independence with ADLs and functional mobility  Occupational Performance areas to address include: grooming , bathing/ shower, dressing, toilet hygiene, transfer to all surfaces, functional mobility, IADLs: safety procedures, Leisure Participation and Social participation  From OT standpoint, recommendation at time of d/c would be Home with family support       OT Discharge Recommendation: No rehabilitation needs

## 2021-08-14 VITALS
BODY MASS INDEX: 25.62 KG/M2 | RESPIRATION RATE: 18 BRPM | WEIGHT: 182.98 LBS | SYSTOLIC BLOOD PRESSURE: 142 MMHG | TEMPERATURE: 97.8 F | OXYGEN SATURATION: 95 % | HEIGHT: 71 IN | HEART RATE: 71 BPM | DIASTOLIC BLOOD PRESSURE: 83 MMHG

## 2021-08-14 PROBLEM — A41.9 SEPSIS (HCC): Status: RESOLVED | Noted: 2021-08-12 | Resolved: 2021-08-14

## 2021-08-14 PROBLEM — E87.2 LACTIC ACIDOSIS: Status: RESOLVED | Noted: 2021-08-12 | Resolved: 2021-08-14

## 2021-08-14 PROBLEM — G93.41 METABOLIC ENCEPHALOPATHY: Status: RESOLVED | Noted: 2021-08-13 | Resolved: 2021-08-14

## 2021-08-14 PROBLEM — E87.20 LACTIC ACIDOSIS: Status: RESOLVED | Noted: 2021-08-12 | Resolved: 2021-08-14

## 2021-08-14 LAB
ANION GAP SERPL CALCULATED.3IONS-SCNC: 9 MMOL/L (ref 4–13)
BACTERIA UR CULT: ABNORMAL
BUN SERPL-MCNC: 18 MG/DL (ref 5–25)
CALCIUM SERPL-MCNC: 8.4 MG/DL (ref 8.3–10.1)
CHLORIDE SERPL-SCNC: 105 MMOL/L (ref 100–108)
CO2 SERPL-SCNC: 26 MMOL/L (ref 21–32)
CREAT SERPL-MCNC: 1 MG/DL (ref 0.6–1.3)
ERYTHROCYTE [DISTWIDTH] IN BLOOD BY AUTOMATED COUNT: 12.2 % (ref 11.6–15.1)
GFR SERPL CREATININE-BSD FRML MDRD: 69 ML/MIN/1.73SQ M
GLUCOSE SERPL-MCNC: 109 MG/DL (ref 65–140)
HCT VFR BLD AUTO: 35.3 % (ref 36.5–49.3)
HGB BLD-MCNC: 12.5 G/DL (ref 12–17)
MCH RBC QN AUTO: 33.2 PG (ref 26.8–34.3)
MCHC RBC AUTO-ENTMCNC: 35.4 G/DL (ref 31.4–37.4)
MCV RBC AUTO: 94 FL (ref 82–98)
PLATELET # BLD AUTO: 195 THOUSANDS/UL (ref 149–390)
PMV BLD AUTO: 11 FL (ref 8.9–12.7)
POTASSIUM SERPL-SCNC: 3.9 MMOL/L (ref 3.5–5.3)
PROCALCITONIN SERPL-MCNC: 4.64 NG/ML
RBC # BLD AUTO: 3.76 MILLION/UL (ref 3.88–5.62)
SODIUM SERPL-SCNC: 140 MMOL/L (ref 136–145)
WBC # BLD AUTO: 11.06 THOUSAND/UL (ref 4.31–10.16)

## 2021-08-14 PROCEDURE — 85027 COMPLETE CBC AUTOMATED: CPT | Performed by: PHYSICIAN ASSISTANT

## 2021-08-14 PROCEDURE — 99238 HOSP IP/OBS DSCHRG MGMT 30/<: CPT | Performed by: PHYSICIAN ASSISTANT

## 2021-08-14 PROCEDURE — 84145 PROCALCITONIN (PCT): CPT | Performed by: PHYSICIAN ASSISTANT

## 2021-08-14 PROCEDURE — 80048 BASIC METABOLIC PNL TOTAL CA: CPT | Performed by: PHYSICIAN ASSISTANT

## 2021-08-14 RX ORDER — CIPROFLOXACIN 500 MG/1
500 TABLET, FILM COATED ORAL EVERY 12 HOURS SCHEDULED
Status: DISCONTINUED | OUTPATIENT
Start: 2021-08-14 | End: 2021-08-14 | Stop reason: HOSPADM

## 2021-08-14 RX ORDER — CIPROFLOXACIN 500 MG/1
500 TABLET, FILM COATED ORAL EVERY 12 HOURS SCHEDULED
Qty: 13 TABLET | Refills: 0 | Status: SHIPPED | OUTPATIENT
Start: 2021-08-14 | End: 2021-08-21

## 2021-08-14 RX ORDER — DOCUSATE SODIUM 100 MG/1
100 CAPSULE, LIQUID FILLED ORAL 2 TIMES DAILY
Qty: 10 CAPSULE | Refills: 0 | Status: SHIPPED | OUTPATIENT
Start: 2021-08-14

## 2021-08-14 RX ORDER — POLYETHYLENE GLYCOL 3350 17 G/17G
17 POWDER, FOR SOLUTION ORAL DAILY PRN
Qty: 30 EACH | Refills: 0 | Status: SHIPPED | OUTPATIENT
Start: 2021-08-14

## 2021-08-14 RX ORDER — CIPROFLOXACIN 500 MG/1
500 TABLET, FILM COATED ORAL EVERY 12 HOURS SCHEDULED
Qty: 13 TABLET | Refills: 0 | Status: SHIPPED | OUTPATIENT
Start: 2021-08-14 | End: 2021-08-14

## 2021-08-14 RX ADMIN — CEFEPIME HYDROCHLORIDE 2000 MG: 2 INJECTION, POWDER, FOR SOLUTION INTRAVENOUS at 00:00

## 2021-08-14 RX ADMIN — ENOXAPARIN SODIUM 40 MG: 40 INJECTION SUBCUTANEOUS at 09:13

## 2021-08-14 RX ADMIN — LOSARTAN POTASSIUM 25 MG: 25 TABLET, FILM COATED ORAL at 09:13

## 2021-08-14 RX ADMIN — CIPROFLOXACIN HYDROCHLORIDE 500 MG: 500 TABLET, FILM COATED ORAL at 11:01

## 2021-08-14 NOTE — DISCHARGE SUMMARY
[facility]  Discharge- John Gorman 1935, 82 y o  male MRN: [id number]  Unit/Bed#: -Fabio Encounter: [id number]  Primary Care Provider: Agusto Ramos DO   Date and time admitted to hospital: 8/11/2021 10:41 PM    Parkinson disease ([facility] [address] )  Assessment & Plan  · Rasagiline (azilect) is non formulary, pt did not receive in hospital due to patient's wife not being able to bring it in, will resume as soon as he returns home    Urinary tract infection  Assessment & Plan  · Present on admission as evidenced by grossly (+) urinalysis, with recent  manipulation from TURP sugery  · Patient met sepsis criteria with leukocytosis and elevated HR on admission, now resolved  · Patient was started on Cefepime IV awaiting urine culture results, received 4 days of therapy  · Results returned today P   Aeruginosa susceptible to cipro  · Started the patient on PO cipro 500 BID for 7 days  · Discussed with urology they are OK with plan for PO abx and discharge, will follow with urology as planned on Monday to have marley catheter removed      Medical Problems     Resolved Problems  Date Reviewed: 8/14/2021        Resolved    * (Principal) Sepsis ([facility] ) 8/14/2021     Resolved by  Alex Bashir PA-C    Lactic acidosis 8/14/2021     Resolved by  Alex Bashir PA-C    Metabolic encephalopathy 7/64/1640     Resolved by  Alex Bashir PA-C              Discharging Physician / Practitioner: Alex Bashir PA-C  PCP: Agusto Ramos DO  Admission Date:   Admission Orders (From admission, onward)     Ordered        08/12/21 1202  Inpatient Admission  Once         08/12/21 0207  Place in Observation  Once                   Discharge Date: 08/14/21    Consultations During Hospital Stay:  · Urology - Dr Marcela Morales    Procedures Performed:   · none    Significant Findings / Test Results:   CT abd - Postoperative changes of recent TURP procedure with a Marley catheter with the tip within a collapsed urinary bladder, limiting evaluation  No acute intra-abdominal abnormality    Scattered colonic diverticulosis with no inflammatory changes present to suggest acute diverticulitis  Cholelithiasis with no pericholecystic inflammatory change  Incidental Findings:   · none    Test Results Pending at Discharge (will require follow up):   · none     Outpatient Tests Requested:  · none    Complications:  none    Reason for Admission: UTI sepsis    Hospital Course:   Zoltan Ashley is a 82 y o  male patient who originally presented to the hospital on 8/11/2021 due to UTI sepsis  Pt underwent TURP and cystolitholapaxy on 8/5 and on 8/11 began developing chills, fatigue, and intermittent episdoes of confusion  EMS brought the pt to the ED where he was oriented but slow to respond  UA was completed which showed a urinary tract infection  He had already been taking Keflex PO as provided by urology but this was switched to cefepime IV in the ED  Urine cultures showed P aeruginosa with sensitivity to cipro  Given the patient has remained afebrile and no longer seems confused, he is appropriate for discharge today  He has had a marley catheter placed since his surgery which will remain in place until he sees Urology in follow-up on Monday  Please see above list of diagnoses and related plan for additional information  Condition at Discharge: stable    Discharge Day Visit / Exam:   Subjective:  Patient reports that he feels well today and is ready to go home  He denies fevers, chills, urinary complaints, abdominal pain, chest pains, SOB  Vitals: Blood Pressure: 142/83 (08/14/21 0649)  Pulse: 71 (08/14/21 0649)  Temperature: 97 8 °F (36 6 °C) (08/14/21 0649)  Temp Source: Oral (08/12/21 0800)  Respirations: 18 (08/14/21 0649)  Height: 5' 11" (180 3 cm) (08/11/21 2245)  Weight - Scale: 83 kg (182 lb 15 7 oz) (08/11/21 2245)  SpO2: 95 % (08/14/21 0649)  Exam:   Physical Exam  Vitals reviewed     Constitutional:       General: He is not in acute distress  Appearance: Normal appearance  He is not ill-appearing  HENT:      Head: Normocephalic and atraumatic  Cardiovascular:      Rate and Rhythm: Normal rate and regular rhythm  Pulses: Normal pulses  Heart sounds: Normal heart sounds  Pulmonary:      Effort: Pulmonary effort is normal       Breath sounds: Normal breath sounds  Abdominal:      General: Bowel sounds are normal       Palpations: Abdomen is soft  Genitourinary:     Comments: Aviles catheter in place with clear yellow urine in bag  Musculoskeletal:         General: Normal range of motion  Cervical back: Normal range of motion and neck supple  Skin:     General: Skin is warm and dry  Neurological:      Mental Status: He is alert and oriented to person, place, and time  Psychiatric:         Mood and Affect: Mood normal          Behavior: Behavior normal           Discussion with Family: Patient declined call to   Discharge instructions/Information to patient and family:   See after visit summary for information provided to patient and family  Provisions for Follow-Up Care:  See after visit summary for information related to follow-up care and any pertinent home health orders  Disposition:   Home    Planned Readmission: none     Discharge Statement:  I spent 30 minutes discharging the patient  This time was spent on the day of discharge  I had direct contact with the patient on the day of discharge  Greater than 50% of the total time was spent examining patient, answering all patient questions, arranging and discussing plan of care with patient as well as directly providing post-discharge instructions  Additional time then spent on discharge activities  Discharge Medications:  See after visit summary for reconciled discharge medications provided to patient and/or family        **Please Note: This note may have been constructed using a voice recognition system**

## 2021-08-14 NOTE — ASSESSMENT & PLAN NOTE
· Rasagiline (azilect) is non formulary, pt did not receive in hospital due to patient's wife not being able to bring it in, will resume as soon as he returns home

## 2021-08-14 NOTE — CASE MANAGEMENT
Case Management Assessment & Discharge Planning Note    Patient name Jenae Walker  Location /-58 MRN [id number]  : 1935 Date 2021       Current Admission Date: 2021  Current Admission Diagnosis:  Enlarged prostate with lower urinary tract symptoms (LUTS)   Patient Active Problem List   Diagnosis    Bladder stones    Gross hematuria    Enlarged prostate with lower urinary tract symptoms (LUTS)    Encounter to discuss test results    Urinary tract infection    Parkinson disease ([facility] [address] )    Previous Admission - Discharge Date:21   LOS (days): 2  Geometric Mean LOS (GMLOS) (days): 5 00  Days to GMLOS:3 Previous Discharge Diagnosis:  There are no discharge diagnoses documented for the most recent discharge  Risk of Unplanned Readmission Score  Predictive Model Details          6 (Low)  Factor Value    Calculated 2021 12:04 29% Active NSAID Rx order present    Risk of Unplanned Readmission Model 18% Number of active Rx orders 18     14% ECG/EKG order present in last 6 months     10% Age 82     10% Imaging order present in last 6 months     8% Number of ED visits in last six months 1     6% Active anticoagulant Rx order present     3% Current length of stay 2 002 days     3% Future appointment scheduled         BUNDLE: Bundled Patient Payment:  (no)    Reason for Referral:    OBJECTIVE:  Pt is a 82y o  year old /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[employer], white or  [1], male with Mu-ism preference of Protestant admitted on 3716 10:41 PM  Pt is admitted to [id number] at [address] [place] [address] with complaints of Enlarged prostate with lower urinary tract symptoms (LUTS)     Current admission status: Inpatient  Referral Reason: Rehab (OP PT patient choice is [facility] [place] will need paper rx at discharge)    Preferred Pharmacy:   [facility], [facility] [place] [address]  Phone: [phone] Fax: [facility],[address] [place], [address]  [address] [place] [address]  Phone: [phone] Fax: [phone]    Primary Care Provider: Jackson Owens DO    Primary Insurance: [facility]  Secondary Insurance:     ASSESSMENT:  [address], [address] - Spouse   Primary Phone: [phone] (Mobile)               Advance Directives  Does patient have a [address]?: Yes  Does patient have Advance Directives?: Yes  Advance Directives: Living will, Power of  for health care, Power of  for [address] [facility] of Residence: [place]    Readmission Root Cause  30 Day Readmission: No    Patient Information  Mental Status: Alert  During Assessment patient was accompanied by: Spouse  Assessment information provided by[de-identified] Patient, Spouse  Primary Caregiver: Self  Support Systems: Self, Spouse/significant other  What city do you live in?: [place]  Type of Current Residence: [facility]  Living Arrangements: Lives w/ Spouse/significant other  Is patient a ?: No    Activities of Daily Living Prior to Admission  Functional Status: Independent  Ambulates independently?: Yes  Does patient use assisted devices?: No  Does patient currently own DME?: No  Does patient have a history of Outpatient Therapy (PT/OT)?: No  Does the patient have a history of Short-Term Rehab?: No  Does patient currently have [facility]?: No         Patient Information Continued  Income Source: Pension/correction  Does patient receive dialysis treatments?: No  Does patient have a history of substance abuse?: No  Does patient have a history of Mental Health Diagnosis?: No         Means of Transportation  Means of Transport to Appts[de-identified] Drives Self  In the past 12 months, has lack of transportation kept you from medical appointments or from getting medications?: No  In the past 12 months, has lack of transportation kept you from meetings, work, or from getting things needed for daily living?: No    DISCHARGE DETAILS:    Discharge planning discussed with[de-identified] patient and wife  Freedom of Choice: Yes (discussed choice)         Requested [address]         Is the patient interested in [place] [address] at discharge?: No    DME Referral Provided  Referral made for DME?: No              Discharge Destination Plan[de-identified] Home  Transportation at Discharge?: Yes  Type of Transport: Other (Comment) (wife will transport)

## 2021-08-14 NOTE — ASSESSMENT & PLAN NOTE
· Present on admission as evidenced by grossly (+) urinalysis, with recent  manipulation from TURP sugery  · Patient met sepsis criteria with leukocytosis and elevated HR on admission, now resolved  · Patient was started on Cefepime IV awaiting urine culture results, received 4 days of therapy  · Results returned today P   Aeruginosa susceptible to cipro  · Started the patient on PO cipro 500 BID for 7 days  · Discussed with urology they are OK with plan for PO abx and discharge, will follow with urology as planned on Monday to have marley catheter removed

## 2021-08-14 NOTE — NURSING NOTE
Pt discharged home with wife as stand by assistance  Pt continent of bowel, and has marley in, that will be for voiding trial outpatient  Wife and the patient comfortable taking care of the marley  No complaints of pain  Incision healing, no new skin issues  Discharge instructions and med list reviewed and given to the patient's wife  Both patient and his wife have no questions or concerns at this time

## 2021-08-15 LAB
ATRIAL RATE: 106 BPM
P AXIS: 53 DEGREES
PR INTERVAL: 184 MS
QRS AXIS: 76 DEGREES
QRSD INTERVAL: 100 MS
QT INTERVAL: 356 MS
QTC INTERVAL: 472 MS
T WAVE AXIS: 1 DEGREES
VENTRICULAR RATE: 106 BPM

## 2021-08-15 PROCEDURE — 93010 ELECTROCARDIOGRAM REPORT: CPT | Performed by: INTERNAL MEDICINE

## 2021-08-16 ENCOUNTER — PROCEDURE VISIT (OUTPATIENT)
Dept: UROLOGY | Facility: CLINIC | Age: 86
End: 2021-08-16
Payer: COMMERCIAL

## 2021-08-16 VITALS
WEIGHT: 178 LBS | HEART RATE: 74 BPM | DIASTOLIC BLOOD PRESSURE: 70 MMHG | HEIGHT: 71 IN | BODY MASS INDEX: 24.92 KG/M2 | SYSTOLIC BLOOD PRESSURE: 140 MMHG

## 2021-08-16 DIAGNOSIS — R39.11 BENIGN PROSTATIC HYPERPLASIA WITH URINARY HESITANCY: Primary | ICD-10-CM

## 2021-08-16 DIAGNOSIS — N40.1 BENIGN PROSTATIC HYPERPLASIA WITH URINARY HESITANCY: Primary | ICD-10-CM

## 2021-08-16 LAB — POST-VOID RESIDUAL VOLUME, ML POC: 15 ML

## 2021-08-16 PROCEDURE — 51798 US URINE CAPACITY MEASURE: CPT

## 2021-08-16 PROCEDURE — 99211 OFF/OP EST MAY X REQ PHY/QHP: CPT

## 2021-08-16 NOTE — PROGRESS NOTES
8/16/2021    [facility]  1935  [id number]    Diagnosis  Chief Complaint     Benign prostatic hyperplasia with urinary hesitancy          Patient presents for marley cathter removal and void trial managed by Dr Martin Torrez is to return as schedule this afternoon with Rn for post void residual     Procedure Marley removal/voiding trial    Marley catheter removed after deflation of an intact balloon  Patient tolerated well  Encouraged patient to hydrate well and return this afternoon for post void residual   he knows he may return early if uncomfortable and unable to urinate  Patient agrees to this plan  Patient returned this afternoon  Patient states able to void  Patient voided 80 ml while in office  Bladder ultrasound performed and PVR measured 15ml      Recent Results (from the past 4 hour(s))   POCT Measure PVR    Collection Time: 08/16/21  2:09 PM   Result Value Ref Range    POST-VOID RESIDUAL VOLUME, ML POC 15 mL           Vitals:    08/16/21 0857   BP: 140/70   BP Location: Right arm   Patient Position: Sitting   Cuff Size: Standard   Pulse: 74   Weight: 80 7 kg (178 lb)   Height: 5' 11" (1 803 m)           Myranda Crisostomo RN

## 2021-08-16 NOTE — UTILIZATION REVIEW
Selena Damon PA-C   Physician Assistant   Hospitalist   Discharge Summary      Attested   Date of Service:  8/14/2021 11:24 AM               Attested        Attestation signed by Dhruv Salvador MD at 8/14/2021 12:10 PM      Co-Sign Only (Services provided by AP)     I was the supervising/collaborating physician  I acknowledge the AP's documentation and services provided  I was available by phone, if needed, for consultation    Alberto Delgadillo MD           Show:Clear all  [x]Manual[x]Template[x]Copied    Added by:  Eva Klein PA-C    []Michelle for details  [address] [facility]  Discharge- Wayland Shape 1935, 82 y o  male MRN: [id number]  Unit/Bed#: -01 Encounter: [id number]  Primary Care Provider: Joaquin Winter DO   Date and time admitted to hospital: 8/11/2021 10:41 PM     Parkinson disease ([facility] [address] )  Assessment & Plan  · Rasagiline (azilect) is non formulary, pt did not receive in hospital due to patient's wife not being able to bring it in, will resume as soon as he returns home     Urinary tract infection  Assessment & Plan  · Present on admission as evidenced by grossly (+) urinalysis, with recent  manipulation from TURP sugery  · Patient met sepsis criteria with leukocytosis and elevated HR on admission, now resolved  · Patient was started on Cefepime IV awaiting urine culture results, received 4 days of therapy  · Results returned today P   Aeruginosa susceptible to cipro  · Started the patient on PO cipro 500 BID for 7 days  · Discussed with urology they are OK with plan for PO abx and discharge, will follow with urology as planned on Monday to have marley catheter removed            Medical Problems               Resolved Problems  Date Reviewed: 8/14/2021                 Resolved      * (Principal) Sepsis ([facility] [address] ) 8/14/2021        Resolved by  Selena Damon PA-C      Lactic acidosis 8/14/2021        Resolved by  Selena Damon PA-C      Metabolic encephalopathy 8/70/2885        Resolved by  Jarrod Menon PA-C                  Discharging Physician / Practitioner: Jarrod Menon PA-C  PCP: Laura Toro DO  Admission Date:       Admission Orders (From admission, onward)              Ordered          08/12/21 1202   Inpatient Admission  Once           08/12/21 0207   Place in Observation  Once                       Discharge Date: 08/14/21     Consultations During Hospital Stay:  · Urology - Dr James Brown     Procedures Performed:   · none     Significant Findings / Test Results:   CT abd - Postoperative changes of recent TURP procedure with a Marley catheter with the tip within a collapsed urinary bladder, limiting evaluation  No acute intra-abdominal abnormality    Scattered colonic diverticulosis with no inflammatory changes present to suggest acute diverticulitis  Cholelithiasis with no pericholecystic inflammatory change      Incidental Findings:   · none     Test Results Pending at Discharge (will require follow up):   · none     Outpatient Tests Requested:  · none     Complications:  none     Reason for Admission: UTI sepsis     Hospital Course:   Carmen Carbone is a 82 y o  male patient who originally presented to the hospital on 8/11/2021 due to UTI sepsis  Pt underwent TURP and cystolitholapaxy on 8/5 and on 8/11 began developing chills, fatigue, and intermittent episdoes of confusion  EMS brought the pt to the ED where he was oriented but slow to respond  UA was completed which showed a urinary tract infection  He had already been taking Keflex PO as provided by urology but this was switched to cefepime IV in the ED  Urine cultures showed P aeruginosa with sensitivity to cipro  Given the patient has remained afebrile and no longer seems confused, he is appropriate for discharge today   He has had a marley catheter placed since his surgery which will remain in place until he sees Urology in follow-up on Monday          Please see above list of diagnoses and related plan for additional information       Condition at Discharge: stable     Discharge Day Visit / Exam:   Subjective:  Patient reports that he feels well today and is ready to go home  He denies fevers, chills, urinary complaints, abdominal pain, chest pains, SOB  Vitals: Blood Pressure: 142/83 (08/14/21 0649)  Pulse: 71 (08/14/21 0649)  Temperature: 97 8 °F (36 6 °C) (08/14/21 0649)  Temp Source: Oral (08/12/21 0800)  Respirations: 18 (08/14/21 0649)  Height: 5' 11" (180 3 cm) (08/11/21 2245)  Weight - Scale: 83 kg (182 lb 15 7 oz) (08/11/21 2245)  SpO2: 95 % (08/14/21 0649)  Exam:   Physical Exam  Vitals reviewed  Constitutional:       General: He is not in acute distress  Appearance: Normal appearance  He is not ill-appearing  HENT:      Head: Normocephalic and atraumatic  Cardiovascular:      Rate and Rhythm: Normal rate and regular rhythm  Pulses: Normal pulses  Heart sounds: Normal heart sounds  Pulmonary:      Effort: Pulmonary effort is normal       Breath sounds: Normal breath sounds  Abdominal:      General: Bowel sounds are normal       Palpations: Abdomen is soft  Genitourinary:     Comments: Aviles catheter in place with clear yellow urine in bag  Musculoskeletal:         General: Normal range of motion  Cervical back: Normal range of motion and neck supple  Skin:     General: Skin is warm and dry  Neurological:      Mental Status: He is alert and oriented to person, place, and time  Psychiatric:         Mood and Affect: Mood normal          Behavior: Behavior normal             Discussion with Family: Patient declined call to        Discharge instructions/Information to patient and family:   See after visit summary for information provided to patient and family        Provisions for Follow-Up Care:  See after visit summary for information related to follow-up care and any pertinent home health orders         Disposition:   Home     Planned Readmission: none     Discharge Statement:  I spent 30 minutes discharging the patient  This time was spent on the day of discharge  I had direct contact with the patient on the day of discharge  Greater than 50% of the total time was spent examining patient, answering all patient questions, arranging and discussing plan of care with patient as well as directly providing post-discharge instructions    Additional time then spent on discharge activities      Discharge Medications:  See after visit summary for reconciled discharge medications provided to patient and/or family        **Please Note: This note may have been constructed using a voice recognition system**                  Cosigned by: Graciela Hightower MD at 8/14/2021 12:10 PM   Revision History

## 2021-08-17 LAB
BACTERIA BLD CULT: NORMAL
BACTERIA BLD CULT: NORMAL

## 2021-08-18 ENCOUNTER — OFFICE VISIT (OUTPATIENT)
Dept: UROLOGY | Facility: CLINIC | Age: 86
End: 2021-08-18
Payer: COMMERCIAL

## 2021-08-18 VITALS
HEART RATE: 77 BPM | WEIGHT: 179.4 LBS | BODY MASS INDEX: 25.11 KG/M2 | DIASTOLIC BLOOD PRESSURE: 80 MMHG | HEIGHT: 71 IN | SYSTOLIC BLOOD PRESSURE: 122 MMHG

## 2021-08-18 DIAGNOSIS — R39.11 BENIGN PROSTATIC HYPERPLASIA WITH URINARY HESITANCY: Primary | ICD-10-CM

## 2021-08-18 DIAGNOSIS — N40.1 BENIGN PROSTATIC HYPERPLASIA WITH URINARY HESITANCY: Primary | ICD-10-CM

## 2021-08-18 LAB — POST-VOID RESIDUAL VOLUME, ML POC: 16 ML

## 2021-08-18 PROCEDURE — 99024 POSTOP FOLLOW-UP VISIT: CPT | Performed by: PHYSICIAN ASSISTANT

## 2021-08-18 PROCEDURE — 51798 US URINE CAPACITY MEASURE: CPT | Performed by: PHYSICIAN ASSISTANT

## 2021-08-18 NOTE — PROGRESS NOTES
8/18/2021      Chief Complaint   Patient presents with    Follow-up    Benign Prostatic Hypertrophy     Assessment and Plan    1  BPH   2  Bladder calculi   - Status post TURP with cystolitholapaxy on 8/5/21 with Dr Lyndsey Spencer  - Marley catheter removed on 8/16/21 after passing voiding trial    - Tissue pathology from TURP was negative for malignancy   - Surgical staples removed today  Incision healing nicely with no signs of infection    - Pt reports he is doing well and has no complaints  AUA=2   - Demonstrating adequate bladder emptying today with PVR of 16 mL  - F/u in 3 months for PVR, Uroflow and symptom assessment  History of Present Illness  Niharika Thibodeaux is a 82 y o  male here for with history of  Parkinson's disease, BPH, chronic urinary retention, bladder calculi presents for postoperative visit  He is status post TURP with cystolitholapaxy on 8/5/21 with Dr Lyndsey Spencer  His marley catheter was removed on 8/16/21  Tissue pathology from TURP was negative for malignancy  Patient is doing very well and denies any urinary complaints at this time  He reports good reduction in urinary symptoms thus far  He denies any hematuria or dysuria  Denies any issues with incision  PVR=16 mL    AUA SYMPTOM SCORE      Most Recent Value   AUA SYMPTOM SCORE   How often have you had a sensation of not emptying your bladder completely after you finished urinating? 0   How often have you had to urinate again less than two hours after you finished urinating? 0   How often have you found you stopped and started again several times when you urinate?  0   How often have you found it difficult to postpone urination? 0   How often have you had a weak urinary stream?  1   How often have you had to push or strain to begin urination? 0   How many times did you most typically get up to urinate from the time you went to bed at night until the time you got up in the morning?   1   Quality of Life: If you were to spend the rest of your life with your urinary condition just the way it is now, how would you feel about that?  1   AUA SYMPTOM SCORE  2          Review of Systems   Constitutional: Negative for chills and fever  Respiratory: Negative for shortness of breath  Cardiovascular: Negative for chest pain  Gastrointestinal: Negative for abdominal pain  Genitourinary: Negative for difficulty urinating, dysuria, flank pain, frequency, hematuria and urgency  Neurological: Negative for dizziness  Past Medical History  Past Medical History:   Diagnosis Date    Arthritis     Bladder stones     Hypertension     Parkinson disease [facility])        Past Social History  Past Surgical History:   Procedure Laterality Date    COLONOSCOPY      FOOT SURGERY      CT OPEN BLADDER,REMV CALCULUS N/A 8/5/2021    Procedure: CYSTOLITHOTOMY OPEN;  Surgeon: Bonny Torres MD;  [facility]: [facility];  Service: Urology    CT TRANSURETHRAL ELEC-SURG PROSTATECTOM N/A 8/5/2021    Procedure: TRANSURETHRAL RESECTION OF PROSTATE (TURP);   Surgeon: Bonny Torres MD;  [facility];  Service: Urology    TONSILLECTOMY       Social History     Tobacco Use   Smoking Status Never Smoker   Smokeless Tobacco Never Used       Past Family History  Family History   Problem Relation Age of Onset    No Known Problems Mother     Lung cancer Father     No Known Problems Sister     No Known Problems Sister     Lung cancer Sister        Past Social history  Social History     Socioeconomic History    Marital status: /Civil Union     Spouse name: Not on file    Number of children: Not on file    Years of education: Not on file    Highest education level: Not on file   Occupational History    Not on file   Tobacco Use    Smoking status: Never Smoker    Smokeless tobacco: Never Used   Vaping Use    Vaping Use: Never used   Substance and Sexual Activity    Alcohol use: Never    Drug use: Never    Sexual activity: Yes   Other Topics Concern    Not on file   Social History Narrative    Not on file     Social Determinants of Health     Financial Resource Strain:     Difficulty of Paying Living Expenses:    Food Insecurity:     Worried About Running Out of Food in the Last Year:     [address] in the Last Year:    Transportation Needs: No Transportation Needs    Lack of Transportation (Medical): No    Lack of Transportation (Non-Medical):  No   Physical Activity:     Days of Exercise per Week:     Minutes of Exercise per Session:    Stress:     Feeling of Stress :    Social Connections:     Frequency of Communication with Friends and Family:     Frequency of Social Gatherings with Friends and Family:     Attends [employer] Services:     Active Member of Clubs or Organizations:     Attends Club or Organization Meetings:     Marital Status:    Intimate Partner Violence:     Fear of Current or Ex-Partner:     Emotionally Abused:     Physically Abused:     Sexually Abused:        Current Medications  Current Outpatient Medications   Medication Sig Dispense Refill    ciprofloxacin (CIPRO) 500 mg tablet Take 1 tablet (500 mg total) by mouth every 12 (twelve) hours for 13 doses 13 tablet 0    docusate sodium (COLACE) 100 mg capsule Take 1 capsule (100 mg total) by mouth 2 (two) times a day 10 capsule 0    losartan (COZAAR) 25 mg tablet Take 25 mg by mouth daily      Multiple Vitamins-Minerals (CENTRUM SILVER PO) Take by mouth daily      naproxen (NAPROSYN) 500 mg tablet Take 500 mg by mouth 2 (two) times a day with meals      rasagiline (AZILECT) 0 5 mg Take 1 tablet (0 5 mg total) by mouth daily 7 tablet 0    rasagiline (AZILECT) 1 MG Take 0 5 tablets (0 5 mg total) by mouth daily 30 tablet 5    rOPINIRole (REQUIP XL) 2 MG 24 hr tablet Take 1 tablet (2 mg total) by mouth daily at bedtime 30 tablet 5    polyethylene glycol (MIRALAX) 17 g packet Take 17 g by mouth daily as needed (for constipation) (Patient not taking: Reported on 8/18/2021) 30 each 0     No current facility-administered medications for this visit  Allergies  Allergies   Allergen Reactions    Carbidopa W-Levodopa GI Intolerance    Oxycodone Vomiting    Vicodin [Hydrocodone-Acetaminophen] Vomiting         The following portions of the patient's history were reviewed and updated as appropriate: allergies, current medications, past medical history, past social history, past surgical history and problem list       Vitals  Vitals:    08/18/21 1037   BP: 122/80   Pulse: 77   Weight: 81 4 kg (179 lb 6 4 oz)   Height: 5' 11" (1 803 m)           Physical Exam  Physical Exam  Constitutional:       Appearance: Normal appearance  HENT:      Head: Normocephalic and atraumatic  Right Ear: External ear normal       Left Ear: External ear normal    Eyes:      General: No scleral icterus  Conjunctiva/sclera: Conjunctivae normal    Cardiovascular:      Pulses: Normal pulses  Pulmonary:      Effort: Pulmonary effort is normal    Abdominal:      Comments: Well-healing incision without signs of infection or wound dehiscence  Musculoskeletal:      Cervical back: Normal range of motion  Skin:     General: Skin is warm and dry  Neurological:      General: No focal deficit present  Mental Status: He is alert and oriented to person, place, and time  Mental status is at baseline  Psychiatric:         Mood and Affect: Mood normal          Behavior: Behavior normal          Thought Content:  Thought content normal          Judgment: Judgment normal            Results  Recent Results (from the past 1 hour(s))   POCT Measure PVR    Collection Time: 08/18/21 10:42 AM   Result Value Ref Range    POST-VOID RESIDUAL VOLUME, ML POC 16 mL   ]  No results found for: PSA  Lab Results   Component Value Date    CALCIUM 8 4 08/14/2021    K 3 9 08/14/2021    CO2 26 08/14/2021     08/14/2021    BUN 18 08/14/2021    CREATININE 1 00 08/14/2021     Lab Results Component Value Date    WBC 11 06 (H) 08/14/2021    HGB 12 5 08/14/2021    HCT 35 3 (L) 08/14/2021    MCV 94 08/14/2021     08/14/2021           Orders  Orders Placed This Encounter   Procedures    POCT Measure PVR     Samantha Simmons PA-C

## 2021-09-07 ENCOUNTER — PROCEDURE VISIT (OUTPATIENT)
Dept: UROLOGY | Facility: CLINIC | Age: 86
End: 2021-09-07
Payer: COMMERCIAL

## 2021-09-07 ENCOUNTER — OFFICE VISIT (OUTPATIENT)
Dept: NEUROLOGY | Facility: CLINIC | Age: 86
End: 2021-09-07
Payer: COMMERCIAL

## 2021-09-07 VITALS
TEMPERATURE: 97.6 F | BODY MASS INDEX: 24.95 KG/M2 | HEART RATE: 92 BPM | SYSTOLIC BLOOD PRESSURE: 120 MMHG | WEIGHT: 178.2 LBS | DIASTOLIC BLOOD PRESSURE: 60 MMHG | HEIGHT: 71 IN

## 2021-09-07 DIAGNOSIS — R39.11 BENIGN PROSTATIC HYPERPLASIA WITH URINARY HESITANCY: Primary | ICD-10-CM

## 2021-09-07 DIAGNOSIS — G62.9 PERIPHERAL NEUROPATHY: ICD-10-CM

## 2021-09-07 DIAGNOSIS — N40.1 BENIGN PROSTATIC HYPERPLASIA WITH URINARY HESITANCY: Primary | ICD-10-CM

## 2021-09-07 DIAGNOSIS — G20 PARKINSON DISEASE (HCC): Primary | ICD-10-CM

## 2021-09-07 LAB — POST-VOID RESIDUAL VOLUME, ML POC: 205 ML

## 2021-09-07 PROCEDURE — 99024 POSTOP FOLLOW-UP VISIT: CPT

## 2021-09-07 PROCEDURE — 51798 US URINE CAPACITY MEASURE: CPT

## 2021-09-07 PROCEDURE — 99213 OFFICE O/P EST LOW 20 MIN: CPT | Performed by: PSYCHIATRY & NEUROLOGY

## 2021-09-07 RX ORDER — ROPINIROLE 4 MG/1
4 TABLET, FILM COATED, EXTENDED RELEASE ORAL
Qty: 30 TABLET | Refills: 5 | Status: SHIPPED | OUTPATIENT
Start: 2021-09-07 | End: 2021-10-13 | Stop reason: SDUPTHER

## 2021-09-07 RX ORDER — RASAGILINE 1 MG/1
0.5 TABLET ORAL DAILY
Qty: 90 TABLET | Refills: 3 | Status: SHIPPED | OUTPATIENT
Start: 2021-09-07 | End: 2021-09-08 | Stop reason: SDUPTHER

## 2021-09-07 NOTE — PROGRESS NOTES
Eric Morris is a 82 y o  male  Returns in follow-up today with history of Parkinson's disease and neuropathy    Assessment:  1  Parkinson disease ([facility] [address] )    2  Peripheral neuropathy        Plan:   continue Azilect   Increase Requip to XR 4 mg   Follow-up 2-3 months    Discussion:  Agnieszka Burns reports that he is tolerating the modification in his medication with the initiation of Azilect and extended-release Requip  Still feels that his gait is not as good as it should be and at times it feels as if his feet stick to the floor  Have recommended increasing his Requip from 2 mg to 4 mg and will continue with the same dose of Azilect  I will see him back in follow-up in 2-3 months  He will notify me if he does not tolerate the medication or not see enough improvement as he understands the dose can be increased      Subjective:    ALFA romeo returns in follow-up today accompanied by his wife  He reports that since here last he was able to tolerate the initiation of Azilect and the change from immediate release Mirapex to extended-release Requip and is tolerating these medications well  He states he seen a little improvement from his Parkinson's symptoms but is still having some difficulty with walking  He states that at times his feet feel stuck to the floor  He states that when he is walking to get his mail sometimes it feels as if he is going to move faster than he should    He was hospitalized for urosepsis following a  Urologic procedure and is recovering from this      Past Medical History:   Diagnosis Date    Arthritis     Bladder stones     Hypertension     Parkinson disease ([facility] [address] )        Family History:  Family History   Problem Relation Age of Onset    No Known Problems Mother     Lung cancer Father     No Known Problems Sister     No Known Problems Sister     Lung cancer Sister        Past Surgical History:  Past Surgical History:   Procedure Laterality Date    COLONOSCOPY      FOOT SURGERY      [place] OPEN BLADDER,REMV CALCULUS N/A 8/5/2021    Procedure: CYSTOLITHOTOMY OPEN;  Surgeon: Graham Garcia MD;  [facility]: [facility];  Service: Urology    [place] TRANSURETHRAL ELEC-SURG PROSTATECTOM N/A 8/5/2021    Procedure: TRANSURETHRAL RESECTION OF PROSTATE (TURP); Surgeon: Graham Garcia MD;  [facility];  Service: Urology    TONSILLECTOMY         Social History:   reports that he has never smoked  He has never used smokeless tobacco  He reports that he does not drink alcohol and does not use drugs  Allergies:  Carbidopa w-levodopa, Oxycodone, and Vicodin [hydrocodone-acetaminophen]      Current Outpatient Medications:     docusate sodium (COLACE) 100 mg capsule, Take 1 capsule (100 mg total) by mouth 2 (two) times a day, Disp: 10 capsule, Rfl: 0    losartan (COZAAR) 25 mg tablet, Take 25 mg by mouth daily, Disp: , Rfl:     Multiple Vitamins-Minerals (CENTRUM SILVER PO), Take by mouth daily, Disp: , Rfl:     naproxen (NAPROSYN) 500 mg tablet, Take 500 mg by mouth 2 (two) times a day with meals, Disp: , Rfl:     polyethylene glycol (MIRALAX) 17 g packet, Take 17 g by mouth daily as needed (for constipation), Disp: 30 each, Rfl: 0    rasagiline (AZILECT) 1 MG, Take 0 5 tablets (0 5 mg total) by mouth daily, Disp: 30 tablet, Rfl: 5    rOPINIRole (REQUIP XL) 2 MG 24 hr tablet, Take 1 tablet (2 mg total) by mouth daily at bedtime, Disp: 30 tablet, Rfl: 5    rasagiline (AZILECT) 0 5 mg, Take 1 tablet (0 5 mg total) by mouth daily (Patient not taking: Reported on 9/7/2021), Disp: 7 tablet, Rfl: 0     I have reviewed the past medical, social and family history, current medications, allergies, vitals, review of systems and updated this information as appropriate today     Objective:    Vitals:  Blood pressure 120/60, pulse 92, temperature 97 6 °F (36 4 °C), temperature source Temporal, height 5' 11" (1 803 m), weight 80 8 kg (178 lb 3 2 oz)      Physical Exam    Neurological Exam   GENERAL: Well-developed well-nourished man in no acute distress  HEENT/NECK: Head is atraumatic normocephalic, neck is supple  NEUROLOGIC:  Mental Status: Awake and alert without aphasia  Cranial Nerves: Extraocular movements are full  Face is symmetrical  Motor: no rest tremors noted  Mild bradykinesia is noted  Coordination:   Gait reveals a normal base with decreased arm swing            ROS:    Review of Systems   Constitutional: Negative  Negative for appetite change and fever  HENT: Negative  Negative for hearing loss, tinnitus, trouble swallowing and voice change  Eyes: Negative  Negative for photophobia and pain  Respiratory: Negative  Negative for shortness of breath  Cardiovascular: Negative  Negative for palpitations  Gastrointestinal: Negative  Negative for nausea and vomiting  Endocrine: Negative  Negative for cold intolerance  Genitourinary: Negative  Negative for dysuria, frequency and urgency  Musculoskeletal: Negative  Negative for myalgias and neck pain  Skin: Negative  Negative for rash  Neurological: Positive for tremors  Negative for dizziness, seizures, syncope, facial asymmetry, speech difficulty, weakness, light-headedness, numbness and headaches  Patient stated that he has full body tremors at times  Hematological: Negative  Does not bruise/bleed easily  Psychiatric/Behavioral: Negative  Negative for confusion, hallucinations and sleep disturbance

## 2021-09-07 NOTE — PROGRESS NOTES
9/7/2021  Nathanael Mustafa is a 82 y o  male  [id number]    Diagnosis:  Chief Complaint     Benign prostatic hyperplasia with urinary hesitancy          Patient of Dr Marianne Fernandez, recent Cystolithotomy on 8/5 with passed void trial 8/16  Plan:    Plan is to follow up next week with RN for post void residual       Assessment:    Patient arrives to the office with no scheduled appointment with is wife  States he has not urinate properly since early this am, only leaking from the urethra into his depends  Denies burning with urination or any suprapubic tenderness  No guarding with palpation  Initial PVR shows 259  Patient ambulated and instructed to empty his bladder  Repeat PVR shows 205  After discussion with [facility], plan is to return next week with RN for PVR  Advised patient to avoid all bladder irritants, to monitor constipation well, and to hydrate with water agressively  Patient and wife are in agreement with plan       Recent Results (from the past 6 hour(s))   POCT Measure PVR    Collection Time: 09/07/21  3:00 PM   Result Value Ref Range    POST-VOID RESIDUAL VOLUME, ML  mL         Vel Buitrago RN

## 2021-09-08 DIAGNOSIS — G20 PARKINSON DISEASE (HCC): ICD-10-CM

## 2021-09-08 RX ORDER — RASAGILINE 1 MG/1
1 TABLET ORAL DAILY
Qty: 30 TABLET | Refills: 5 | Status: SHIPPED | OUTPATIENT
Start: 2021-09-08 | End: 2022-03-01 | Stop reason: SDUPTHER

## 2021-09-11 ENCOUNTER — HOSPITAL ENCOUNTER (EMERGENCY)
Facility: HOSPITAL | Age: 86
Discharge: HOME | End: 2021-09-11
Attending: EMERGENCY MEDICINE | Admitting: EMERGENCY MEDICINE
Payer: COMMERCIAL

## 2021-09-11 VITALS
RESPIRATION RATE: 18 BRPM | OXYGEN SATURATION: 100 % | HEART RATE: 86 BPM | TEMPERATURE: 97.8 F | DIASTOLIC BLOOD PRESSURE: 86 MMHG | SYSTOLIC BLOOD PRESSURE: 157 MMHG

## 2021-09-11 DIAGNOSIS — R33.8 ACUTE URINARY RETENTION: Primary | ICD-10-CM

## 2021-09-11 DIAGNOSIS — N39.0 URINARY TRACT INFECTION: ICD-10-CM

## 2021-09-11 LAB
BACTERIA UR QL AUTO: ABNORMAL /HPF
BILIRUB UR QL STRIP: NEGATIVE
CLARITY UR: ABNORMAL
COLOR UR: YELLOW
GLUCOSE UR STRIP-MCNC: NEGATIVE MG/DL
HGB UR QL STRIP.AUTO: ABNORMAL
KETONES UR STRIP-MCNC: NEGATIVE MG/DL
LEUKOCYTE ESTERASE UR QL STRIP: ABNORMAL
NITRITE UR QL STRIP: POSITIVE
NON-SQ EPI CELLS URNS QL MICRO: ABNORMAL /HPF
PH UR STRIP.AUTO: 5.5 [PH]
PROT UR STRIP-MCNC: ABNORMAL MG/DL
RBC #/AREA URNS AUTO: ABNORMAL /HPF
SP GR UR STRIP.AUTO: 1.02 (ref 1–1.03)
UROBILINOGEN UR QL STRIP.AUTO: 0.2 E.U./DL
WBC #/AREA URNS AUTO: ABNORMAL /HPF

## 2021-09-11 PROCEDURE — 87086 URINE CULTURE/COLONY COUNT: CPT | Performed by: PHYSICIAN ASSISTANT

## 2021-09-11 PROCEDURE — 87186 SC STD MICRODIL/AGAR DIL: CPT | Performed by: PHYSICIAN ASSISTANT

## 2021-09-11 PROCEDURE — 81001 URINALYSIS AUTO W/SCOPE: CPT | Performed by: PHYSICIAN ASSISTANT

## 2021-09-11 PROCEDURE — 99283 EMERGENCY DEPT VISIT LOW MDM: CPT

## 2021-09-11 PROCEDURE — 99284 EMERGENCY DEPT VISIT MOD MDM: CPT | Performed by: PHYSICIAN ASSISTANT

## 2021-09-11 PROCEDURE — 87077 CULTURE AEROBIC IDENTIFY: CPT | Performed by: PHYSICIAN ASSISTANT

## 2021-09-11 RX ORDER — LEVOFLOXACIN 750 MG/1
750 TABLET ORAL EVERY 24 HOURS
Qty: 6 TABLET | Refills: 0 | Status: SHIPPED | OUTPATIENT
Start: 2021-09-12 | End: 2021-09-18

## 2021-09-11 RX ADMIN — LEVOFLOXACIN 750 MG: 500 TABLET, FILM COATED ORAL at 11:28

## 2021-09-11 NOTE — ED PROVIDER NOTES
History  Chief Complaint   Patient presents with    Urinary Retention     Pt with complaints of urinary retention x 2 days  83yo male with a history of hypertension and Parkinson's disease presenting with his wife for evaluation of difficulty urinating x 2 days  Patient underwent open bladder stone removal and TURP on 8/5/21 with Dr Kelsi Nichole  He had a Aviles catheter postoperatively  Patient has been having intermittent issues with urination since the procedure  Patient was seen 4 days ago in the urology office for difficulty urinating  Postvoid residual was 259 at that time  Plan was for patient to return to the office in 1 week for a repeat PVR and he has this appointment scheduled for 9/15/21  Patient is presenting today with worsening urinary retention  He states he has been unable to urinate more than a few dribbles in the past 2 days and he is having bladder spasms  Patient has amoxicillin at home as he has a history of frequent UTIs  Patient has taken 4 doses thus far  Patient denies any current flank pain  No fevers or chills  History provided by:  Patient, medical records and spouse   used: No    Difficulty Urinating  Presenting symptoms: dysuria    Context: not after injury    Relieved by:  Nothing  Worsened by:  Nothing  Ineffective treatments:  None tried  Associated symptoms: abdominal pain (suprapubic) and urinary retention    Associated symptoms: no diarrhea, no fever, no hematuria, no nausea, no scrotal swelling and no vomiting    Risk factors: bladder surgery        Prior to Admission Medications   Prescriptions Last Dose Informant Patient Reported? Taking?    Multiple Vitamins-Minerals (CENTRUM SILVER PO)  Self Yes No   Sig: Take by mouth daily   docusate sodium (COLACE) 100 mg capsule  Self No No   Sig: Take 1 capsule (100 mg total) by mouth 2 (two) times a day   losartan (COZAAR) 25 mg tablet  Self Yes No   Sig: Take 25 mg by mouth daily   naproxen (NAPROSYN) 500 mg tablet  Self Yes No   Sig: Take 500 mg by mouth 2 (two) times a day with meals   polyethylene glycol (MIRALAX) 17 g packet  Self No No   Sig: Take 17 g by mouth daily as needed (for constipation)   rOPINIRole (REQUIP XL) 4 MG 24 hr tablet   No No   Sig: Take 1 tablet (4 mg total) by mouth daily at bedtime   rasagiline (AZILECT) 1 MG   No No   Sig: Take 1 tablet (1 mg total) by mouth daily      Facility-Administered Medications: None       Past Medical History:   Diagnosis Date    Arthritis     Bladder stones     Hypertension     Parkinson disease ([facility] [address] )        Past Surgical History:   Procedure Laterality Date    COLONOSCOPY      FOOT SURGERY      MS OPEN BLADDER,REMV CALCULUS N/A 8/5/2021    Procedure: CYSTOLITHOTOMY OPEN;  Surgeon: Jeb Lau MD;  [facility];  Service: Urology    MS TRANSURETHRAL ELEC-SURG PROSTATECTOM N/A 8/5/2021    Procedure: TRANSURETHRAL RESECTION OF PROSTATE (TURP); Surgeon: Jeb Lau MD;  [facility];  Service: Urology    TONSILLECTOMY         Family History   Problem Relation Age of Onset    No Known Problems Mother     Lung cancer Father     No Known Problems Sister     No Known Problems Sister     Lung cancer Sister      I have reviewed and agree with the history as documented  E-Cigarette/Vaping    E-Cigarette Use Never User      E-Cigarette/Vaping Substances    Nicotine No     THC No     CBD No     Flavoring No     Other No     Unknown No      Social History     Tobacco Use    Smoking status: Never Smoker    Smokeless tobacco: Never Used   Vaping Use    Vaping Use: Never used   Substance Use Topics    Alcohol use: Never    Drug use: Never       Review of Systems   Constitutional: Negative for chills and fever  HENT: Negative for drooling and voice change  Eyes: Negative for discharge and redness  Respiratory: Negative for shortness of breath and stridor  Cardiovascular: Negative for chest pain and leg swelling  Gastrointestinal: Positive for abdominal pain (suprapubic)  Negative for diarrhea, nausea and vomiting  Genitourinary: Positive for difficulty urinating and dysuria  Negative for hematuria and scrotal swelling  Musculoskeletal: Negative for neck pain and neck stiffness  Skin: Negative for color change and rash  Neurological: Negative for seizures and syncope  Psychiatric/Behavioral: Negative for confusion  The patient is not nervous/anxious  All other systems reviewed and are negative  Physical Exam  Physical Exam  Vitals and nursing note reviewed  Constitutional:       General: He is not in acute distress  Appearance: He is well-developed  He is not diaphoretic  Comments: Appears uncomfortable, non-toxic   HENT:      Head: Normocephalic and atraumatic  Right Ear: External ear normal       Left Ear: External ear normal    Eyes:      General: No scleral icterus  Right eye: No discharge  Left eye: No discharge  Conjunctiva/sclera: Conjunctivae normal    Cardiovascular:      Rate and Rhythm: Normal rate and regular rhythm  Heart sounds: Normal heart sounds  No murmur heard  Pulmonary:      Effort: Pulmonary effort is normal  No respiratory distress  Breath sounds: Normal breath sounds  No stridor  No wheezing or rales  Abdominal:      General: Bowel sounds are normal  There is no distension  Palpations: Abdomen is soft  Tenderness: There is abdominal tenderness in the suprapubic area  There is no right CVA tenderness, left CVA tenderness or guarding  Comments: +Suprapubic tenderness  Surgical incision c/d/i without erythema  No CVA tenderness  Musculoskeletal:         General: No deformity  Normal range of motion  Cervical back: Normal range of motion and neck supple  Skin:     General: Skin is warm and dry  Neurological:      General: No focal deficit present  Mental Status: He is alert  He is not disoriented  GCS: GCS eye subscore is 4  GCS verbal subscore is 5  GCS motor subscore is 6  Psychiatric:         Mood and Affect: Mood normal          Behavior: Behavior normal          Vital Signs  ED Triage Vitals [09/11/21 0956]   Temperature Pulse Respirations Blood Pressure SpO2   97 8 °F (36 6 °C) 86 18 157/86 100 %      Temp Source Heart Rate Source Patient Position - Orthostatic VS BP Location FiO2 (%)   Oral Monitor Lying Right arm --      Pain Score       Worst Possible Pain           Vitals:    09/11/21 0956   BP: 157/86   Pulse: 86   Patient Position - Orthostatic VS: Lying         Visual Acuity      ED Medications  Medications   levofloxacin (LEVAQUIN) tablet 750 mg (750 mg Oral Given 9/11/21 1128)       Diagnostic Studies  Results Reviewed     Procedure Component Value Units Date/Time    Urine Microscopic [[id number]]  (Abnormal) Collected: 09/11/21 1047    Lab Status: Final result Specimen: Urine, Clean Catch Updated: 09/11/21 1116     RBC, UA 20-30 /hpf      WBC, UA Innumerable /hpf      Epithelial Cells None Seen /hpf      Bacteria, UA Occasional /hpf     Urine culture [[id number]] Collected: 09/11/21 1047    Lab Status: In process Specimen: Urine, Clean Catch Updated: 09/11/21 1116    UA w Reflex to Microscopic w Reflex to Culture [[id number]]  (Abnormal) Collected: 09/11/21 1047    Lab Status: Final result Specimen: Urine, Clean Catch Updated: 09/11/21 1104     Color, UA Yellow     Clarity, UA Cloudy     Specific Gravity, UA 1 025     pH, UA 5 5     Leukocytes, UA Moderate     Nitrite, UA Positive     Protein, UA 30 (1+) mg/dl      Glucose, UA Negative mg/dl      Ketones, UA Negative mg/dl      Urobilinogen, UA 0 2 E U /dl      Bilirubin, UA Negative     Blood, UA Large                 No orders to display              Procedures  Procedures         ED Course  ED Course as of Sep 11 1656   Sat Sep 11, 2021   1027 Bladder scan 474cc  Will order Aviles catheter         1042 Aviles catheter placed by nursing staff with return of about 700cc urine  1115 Urine culture from last month grew out Pseudomonas  Will start on a course of Levaquin  Nitrite, UA(!): Positive                     MDM  Number of Diagnoses or Management Options  Acute urinary retention: new and requires workup  Urinary tract infection: new and requires workup  Diagnosis management comments: 81yo male presenting for difficulty urinating x 2 days  Hx of recent open bladder stone removal and TURP last month  Unable to void more than a few dribbles over the past few days  No f/c  No flank pain  Patient is afebrile and hemodynamically stable  +Suprapubic tenderness  No CVA tenderness on exam      Bladder scan with >470cc  Aviles catheter placed by nursing staff with return of about 700cc of urine  UA is nitrite positive with innumerable WBCs  Patient feeling significantly improved after catheter placement and suprapubic pain has resolved  No indication for further workup at this time  Patient started on a course of Levaquin per previous urine culture sensitivities  Advised close f/u with urology for catheter removal  ED return precautions discussed  Patient expressed understanding and is agreeable to plan  Patient discharged in stable condition           Amount and/or Complexity of Data Reviewed  Clinical lab tests: ordered and reviewed  Review and summarize past medical records: yes    Risk of Complications, Morbidity, and/or Mortality  Presenting problems: moderate  Diagnostic procedures: moderate  Management options: moderate    Patient Progress  Patient progress: improved      Disposition  Final diagnoses:   Acute urinary retention   Urinary tract infection     Time reflects when diagnosis was documented in both MDM as applicable and the Disposition within this note     Time User Action Codes Description Comment    9/11/2021 11:17 AM Barbara Ding Add [R33 8] Acute urinary retention     9/11/2021 11:17 AM Barbara Ding Add [N39 0] Urinary tract infection       ED Disposition     ED Disposition Condition Date/Time Comment    Discharge Stable Sat Sep 11, 2021 11:17 AM Peg Sandhoff discharge to home/self care              Follow-up Information     Follow up With Specialties Details Why Contact Info Additional [address] [place] [facility] Schedule an appointment as soon as possible for a visit   [address]  [address] [address]  701  [facility] [place], [address], [address], [place], [place], [address] [facility] Emergency Department Emergency Medicine  If symptoms worsen [address] [facility] [address] [facility] Emergency Department, [address] [facility], [place], [place], [address]          Discharge Medication List as of 9/11/2021 11:19 AM      START taking these medications    Details   levofloxacin (LEVAQUIN) 750 mg tablet Take 1 tablet (750 mg total) by mouth every 24 hours for 6 days, Starting Sun 9/12/2021, Until Sat 9/18/2021, Normal         CONTINUE these medications which have NOT CHANGED    Details   docusate sodium (COLACE) 100 mg capsule Take 1 capsule (100 mg total) by mouth 2 (two) times a day, Starting Sat 8/14/2021, Normal      losartan (COZAAR) 25 mg tablet Take 25 mg by mouth daily, Historical Med      Multiple Vitamins-Minerals (CENTRUM SILVER PO) Take by mouth daily, Historical Med      naproxen (NAPROSYN) 500 mg tablet Take 500 mg by mouth 2 (two) times a day with meals, Historical Med      polyethylene glycol (MIRALAX) 17 g packet Take 17 g by mouth daily as needed (for constipation), Starting Sat 8/14/2021, Normal      rasagiline (AZILECT) 1 MG Take 1 tablet (1 mg total) by mouth daily, Starting Wed 9/8/2021, Normal      rOPINIRole (REQUIP XL) 4 MG 24 hr tablet Take 1 tablet (4 mg total) by mouth daily at bedtime, Starting Tue 9/7/2021, Normal No discharge procedures on file      PDMP Review       Value Time User    PDMP Reviewed  Yes 8/5/2021  9:17 AM Darian Holder MD          ED Provider  Electronically Signed by           Jewel Ventura PA-C  09/11/21 4193

## 2021-09-11 NOTE — DISCHARGE INSTRUCTIONS
Take antibiotics as prescribed  Drink plenty of fluids and rest     Please follow-up with urology for catheter removal  Return to the ER with any worsening symptoms, fevers, chills

## 2021-09-14 ENCOUNTER — TELEPHONE (OUTPATIENT)
Dept: UROLOGY | Facility: AMBULATORY SURGERY CENTER | Age: 86
End: 2021-09-14

## 2021-09-14 LAB
BACTERIA UR CULT: ABNORMAL
BACTERIA UR CULT: ABNORMAL

## 2021-09-14 NOTE — TELEPHONE ENCOUNTER
Patient was seen last week as nurse visit for PVR due to trouble voiding  PVR at that time 259 ml  Plan to follow up in one week for repeat PVR  Since patient seen in the ER on 9/11 for retention  Marley placed for 700 ml return  Patient discharged with marley catheter  Patient with recent TURP/Cystolithotomy with Dr Aurelio Moya on 8/5  Not currently on prostate medications  Patient with pending PVR appointment on 9/15  Please confirm appropriate to continue with plan and will adjust for void trial or should post pone given recent marley placement

## 2021-09-15 ENCOUNTER — TELEPHONE (OUTPATIENT)
Dept: UROLOGY | Facility: CLINIC | Age: 86
End: 2021-09-15

## 2021-09-15 ENCOUNTER — PROCEDURE VISIT (OUTPATIENT)
Dept: UROLOGY | Facility: CLINIC | Age: 86
End: 2021-09-15
Payer: COMMERCIAL

## 2021-09-15 VITALS
SYSTOLIC BLOOD PRESSURE: 116 MMHG | BODY MASS INDEX: 24.78 KG/M2 | WEIGHT: 177 LBS | HEART RATE: 82 BPM | HEIGHT: 71 IN | DIASTOLIC BLOOD PRESSURE: 70 MMHG

## 2021-09-15 DIAGNOSIS — R33.9 URINARY RETENTION: Primary | ICD-10-CM

## 2021-09-15 LAB — POST-VOID RESIDUAL VOLUME, ML POC: 59 ML

## 2021-09-15 PROCEDURE — 51798 US URINE CAPACITY MEASURE: CPT

## 2021-09-15 NOTE — PROGRESS NOTES
9/15/2021    Wayland Shape  1935  [id number]    Diagnosis  Chief Complaint     Urinary Retention          Patient presents for urinary retention managed by Dr Diane Nguyễn  Patient to follow up with [facility] on 11/19  Informed patient if he has any issues with retention sooner than that to contact our office  Patient agrees to plan  Procedure Aviles removal/voiding trial    Aviles catheter removed after deflation of an intact balloon  Patient tolerated well  Encouraged patient to hydrate well and return this afternoon for post void residual   he knows he may return early if uncomfortable and unable to urinate  Patient agrees to this plan  Patient returned this afternoon  Patient states able to void  Patient voided while in office  Bladder ultrasound performed and PVR measured 59ml      Recent Results (from the past 4 hour(s))   POCT Measure PVR    Collection Time: 09/15/21  2:25 PM   Result Value Ref Range    POST-VOID RESIDUAL VOLUME, ML POC 59 mL           Vitals:    09/15/21 0938   BP: 116/70   Pulse: 82   Weight: 80 3 kg (177 lb)   Height: 5' 11" (1 803 m)           Cara Flores

## 2021-09-15 NOTE — TELEPHONE ENCOUNTER
Called and spoke to Everton Shepherd, patient's wife  Informed Everton Shepherd that we recommend cancelling patient's appointment today with office nurse due to patient having catheter put in the ED on 9/11  Patient then stated he did not want to have the catheter in any longer that he needs it out  Informed patient and wife that if we take it out too soon patient can go back into retention that we would like to wait a week  Patient stated he can't have catheter in anymore and wants it out  Informed patient that he can come in earlier for his appointment and we will pull catheter out and patient will have to come back this afternoon for PVR  Patient agreed on this action   No further questions at this time

## 2021-10-19 ENCOUNTER — TELEPHONE (OUTPATIENT)
Dept: NEUROLOGY | Facility: CLINIC | Age: 86
End: 2021-10-19

## 2021-10-19 DIAGNOSIS — G20 PARKINSON DISEASE (HCC): ICD-10-CM

## 2021-10-19 RX ORDER — ROPINIROLE 4 MG/1
4 TABLET, FILM COATED, EXTENDED RELEASE ORAL
Qty: 90 TABLET | Refills: 3 | Status: SHIPPED | OUTPATIENT
Start: 2021-10-19 | End: 2021-11-26

## 2021-11-19 ENCOUNTER — OFFICE VISIT (OUTPATIENT)
Dept: UROLOGY | Facility: CLINIC | Age: 86
End: 2021-11-19
Payer: COMMERCIAL

## 2021-11-19 VITALS
WEIGHT: 190 LBS | HEART RATE: 72 BPM | BODY MASS INDEX: 26.6 KG/M2 | HEIGHT: 71 IN | SYSTOLIC BLOOD PRESSURE: 120 MMHG | DIASTOLIC BLOOD PRESSURE: 86 MMHG

## 2021-11-19 DIAGNOSIS — N40.1 BENIGN PROSTATIC HYPERPLASIA WITH URINARY HESITANCY: Primary | ICD-10-CM

## 2021-11-19 DIAGNOSIS — R39.11 BENIGN PROSTATIC HYPERPLASIA WITH URINARY HESITANCY: Primary | ICD-10-CM

## 2021-11-19 DIAGNOSIS — N52.8 OTHER MALE ERECTILE DYSFUNCTION: ICD-10-CM

## 2021-11-19 LAB — POST-VOID RESIDUAL VOLUME, ML POC: 0 ML

## 2021-11-19 PROCEDURE — 99213 OFFICE O/P EST LOW 20 MIN: CPT | Performed by: PHYSICIAN ASSISTANT

## 2021-11-19 PROCEDURE — 51798 US URINE CAPACITY MEASURE: CPT | Performed by: PHYSICIAN ASSISTANT

## 2021-11-19 RX ORDER — TADALAFIL 20 MG/1
20 TABLET ORAL AS NEEDED
Qty: 30 TABLET | Refills: 0 | Status: SHIPPED | OUTPATIENT
Start: 2021-11-19

## 2021-11-26 DIAGNOSIS — G20 PARKINSON DISEASE (HCC): Primary | ICD-10-CM

## 2021-11-26 RX ORDER — ROPINIROLE 6 MG/1
6 TABLET, FILM COATED, EXTENDED RELEASE ORAL
Qty: 30 TABLET | Refills: 5 | Status: SHIPPED | OUTPATIENT
Start: 2021-11-26 | End: 2022-01-27 | Stop reason: SDUPTHER

## 2021-12-13 ENCOUNTER — TELEPHONE (OUTPATIENT)
Dept: NEUROLOGY | Facility: CLINIC | Age: 86
End: 2021-12-13

## 2021-12-16 ENCOUNTER — TELEPHONE (OUTPATIENT)
Dept: NEUROLOGY | Facility: CLINIC | Age: 86
End: 2021-12-16

## 2022-01-11 ENCOUNTER — TELEPHONE (OUTPATIENT)
Dept: NEUROLOGY | Facility: CLINIC | Age: 87
End: 2022-01-11

## 2022-01-11 NOTE — TELEPHONE ENCOUNTER
laura with clinical pharm services with [employer] called asking for additional clinical info in regards to ropinerole er   states that pt started PA on 1/1/22      Clinical questions completed

## 2022-01-13 NOTE — TELEPHONE ENCOUNTER
gretta from [facility] called regarding ropinerole er  States that PA is denied, pt must try and fail  pramipexole, sinemet and mirapex    [phone]    Per chart, pt has tried sinemet and pramipexole    Called clinical services utilization management at [phone]  Spoke to joyce  He reviewed denial again and Pt must try and failed ropinerole  If need to appeal, Appeal is done is a separate dept    Medicare appeals dept fax- [phone]    Please advise

## 2022-01-14 DIAGNOSIS — G20 PARKINSON DISEASE (HCC): Primary | ICD-10-CM

## 2022-01-14 RX ORDER — ROPINIROLE 3 MG/1
3 TABLET, FILM COATED ORAL 3 TIMES DAILY
Qty: 90 TABLET | Refills: 5 | Status: SHIPPED | OUTPATIENT
Start: 2022-01-14 | End: 2022-01-31

## 2022-01-14 NOTE — TELEPHONE ENCOUNTER
Left message on patient's voicemail regarding the denial   I have sent a prescription for the immediate release Requip remove in 3 times daily    If he has problems with this or does not find to be effective he will notify me and go back to his prior Auth

## 2022-01-27 DIAGNOSIS — G20 PARKINSON DISEASE (HCC): ICD-10-CM

## 2022-01-27 RX ORDER — ROPINIROLE 6 MG/1
6 TABLET, FILM COATED, EXTENDED RELEASE ORAL
Qty: 30 TABLET | Refills: 0 | Status: SHIPPED | OUTPATIENT
Start: 2022-01-27 | End: 2022-01-31

## 2022-01-31 DIAGNOSIS — G20 PARKINSON DISEASE (HCC): ICD-10-CM

## 2022-01-31 RX ORDER — ROPINIROLE 6 MG/1
6 TABLET, FILM COATED, EXTENDED RELEASE ORAL
Qty: 30 TABLET | Refills: 5 | Status: SHIPPED | OUTPATIENT
Start: 2022-01-31 | End: 2022-03-30

## 2022-03-01 DIAGNOSIS — G20 PARKINSON DISEASE (HCC): ICD-10-CM

## 2022-03-01 NOTE — TELEPHONE ENCOUNTER
Medication Refills   Person Calling In  Name if not patient Patient    Medication name rasagiline/Azilect    Medication Dosage  Medication Frequency 1mg   Take 1 tablet daily   [address] [facility]   Additional Information

## 2022-03-01 NOTE — TELEPHONE ENCOUNTER
----- Message from Supriya Luciano sent at 2/27/2022  7:06 AM EST -----  Regarding: Refill  I need a new prescription for rasagiline sent to [facility] they won't  refill until they get one   Thanks

## 2022-03-02 ENCOUNTER — TELEPHONE (OUTPATIENT)
Dept: NEUROLOGY | Facility: CLINIC | Age: 87
End: 2022-03-02

## 2022-03-02 DIAGNOSIS — G20 PARKINSON DISEASE (HCC): ICD-10-CM

## 2022-03-02 RX ORDER — RASAGILINE 1 MG/1
1 TABLET ORAL DAILY
Qty: 90 TABLET | Refills: 5 | Status: SHIPPED | OUTPATIENT
Start: 2022-03-02 | End: 2022-03-02 | Stop reason: SDUPTHER

## 2022-03-02 RX ORDER — RASAGILINE 1 MG/1
1 TABLET ORAL DAILY
Qty: 90 TABLET | Refills: 5 | Status: SHIPPED | OUTPATIENT
Start: 2022-03-02 | End: 2022-03-07

## 2022-03-02 NOTE — TELEPHONE ENCOUNTER
Left patient a voicemail making him aware rasagiline has been sent over to [phone] for a 90 day supply  Phone number provided should patient have any further questions

## 2022-03-07 DIAGNOSIS — G20 PARKINSON DISEASE (HCC): ICD-10-CM

## 2022-03-07 RX ORDER — RASAGILINE 1 MG/1
TABLET ORAL
Qty: 30 TABLET | Refills: 3 | Status: SHIPPED | OUTPATIENT
Start: 2022-03-07 | End: 2022-06-19 | Stop reason: SDUPTHER

## 2022-03-24 ENCOUNTER — TELEPHONE (OUTPATIENT)
Dept: NEUROLOGY | Facility: CLINIC | Age: 87
End: 2022-03-24

## 2022-03-24 NOTE — TELEPHONE ENCOUNTER
----- Message from Cody Booth sent at 3/24/2022 11:04 AM EDT -----  Regarding: FW: parkinson    ----- Message -----  From: Jennifer Portillo  Sent: 3/24/2022  10:34 AM EDT  To: Neurology BEHAVIORAL MEDICINE AT [facility]  Subject: parkinson                                        My condition has gotten worse   A more tremors, trouble moving and walking, balance ect Could you please  up my prescription or let me try something else that you think would be better

## 2022-03-28 NOTE — TELEPHONE ENCOUNTER
Called [phone] and left a message on pt's answering machine for a call back    Called [phone] and left a message on pt's answering machine for a call back    Letter mailed

## 2022-03-30 ENCOUNTER — TELEPHONE (OUTPATIENT)
Dept: NEUROLOGY | Facility: CLINIC | Age: 87
End: 2022-03-30

## 2022-03-30 NOTE — TELEPHONE ENCOUNTER
----- Message from Lucy sent at 3/29/2022  3:44 PM EDT -----  Regarding: FW: Message    ----- Message -----  From: Barron Blas  Sent: 3/29/2022  12:58 PM EDT  To: Neurology BEHAVIORAL MEDICINE AT [facility] Clinical  Subject: Message                                          Please let me know if you have got my message from 3/24

## 2022-03-30 NOTE — TELEPHONE ENCOUNTER
Spoke with patient  He is not able to tolerate Sinemet which would be the best medication for him    I did increase the dose of his Requip to 8 mg and he will notify me if he continues to have problems otherwise I will see him at his follow-up in early

## 2022-03-30 NOTE — TELEPHONE ENCOUNTER
Called [phone] and left a message on pt's answering machine for a call back    Called [phone] and left a message on pt's answering machine for a call back    [employer] message sent

## 2022-04-28 ENCOUNTER — TELEPHONE (OUTPATIENT)
Dept: NEUROLOGY | Facility: CLINIC | Age: 87
End: 2022-04-28

## 2022-05-04 ENCOUNTER — OFFICE VISIT (OUTPATIENT)
Dept: NEUROLOGY | Facility: CLINIC | Age: 87
End: 2022-05-04
Payer: COMMERCIAL

## 2022-05-04 VITALS
HEIGHT: 71 IN | DIASTOLIC BLOOD PRESSURE: 82 MMHG | BODY MASS INDEX: 26.71 KG/M2 | HEART RATE: 70 BPM | WEIGHT: 190.8 LBS | SYSTOLIC BLOOD PRESSURE: 154 MMHG

## 2022-05-04 DIAGNOSIS — G20 PARKINSON DISEASE (HCC): Primary | ICD-10-CM

## 2022-05-04 DIAGNOSIS — G62.9 PERIPHERAL NEUROPATHY: ICD-10-CM

## 2022-05-04 PROCEDURE — 99214 OFFICE O/P EST MOD 30 MIN: CPT | Performed by: PSYCHIATRY & NEUROLOGY

## 2022-05-04 RX ORDER — GABAPENTIN 100 MG/1
CAPSULE ORAL
Qty: 100 CAPSULE | Refills: 5 | Status: SHIPPED | OUTPATIENT
Start: 2022-05-04

## 2022-05-04 NOTE — PROGRESS NOTES
Tobias Francis is a 82 y o  male  returns in follow-up today with history of Parkinson's disease and peripheral neuropathy    Assessment:  1  Parkinson disease ([facility] [address] )    2  Peripheral neuropathy        Plan:  Continue Requip   Continue Azilect   Initiate gabapentin   Follow-up 6 month    Discussion:  Bouchra Woods reports overall his Parkinson's symptoms have been fairly stable with present management and he tolerates his medications without adverse effect  More recently has been having some issues that sound like neuropathic pain in his feet and have recommended a trial of gabapentin titrating from 100 milligrams up to 300 milligrams if necessary  We discussed potential adverse effects  He will avoid fall risk and I will see him back in follow-up in 6 months      Subjective:    HPI  Bouchra Woods returns in follow-up today accompanied by his wife  He reports that since here last with the increased dose of Requip and his Azilect his Parkinson's symptoms are fairly well controlled  He does not notice much in the way of tremor during the day  He states at nighttime he sometimes awakens shivering the last for a couple of minutes and then it goes away  He is aware of issues with his balance and finds that he has difficulty walking backwards and also has to be careful walking forwards is sometimes he is going faster than he wants to  He reports sometimes at night he has pain in his feet  His wife will massage them and this seems to help  He describes a burning aching type pain pain    He states he has tried acupuncture a couple of times and has gotten some relief      Past Medical History:   Diagnosis Date    Arthritis     Bladder stones     Hypertension     Parkinson disease ([facility] [address] )        Family History:  Family History   Problem Relation Age of Onset    No Known Problems Mother     Lung cancer Father     No Known Problems Sister     No Known Problems Sister     Lung cancer Sister        Past Surgical History:  Past Surgical History:   Procedure Laterality Date    COLONOSCOPY      FOOT SURGERY      IA OPEN BLADDER,REMV CALCULUS N/A 8/5/2021    Procedure: CYSTOLITHOTOMY OPEN;  Surgeon: Kodi Xavier MD;  [facility];  Service: Urology    IA TRANSURETHRAL ELEC-SURG PROSTATECTOM N/A 8/5/2021    Procedure: TRANSURETHRAL RESECTION OF PROSTATE (TURP); Surgeon: Kodi Xavier MD;  [facility];  Service: Urology    TONSILLECTOMY         Social History:   reports that he has never smoked  He has never used smokeless tobacco  He reports that he does not drink alcohol and does not use drugs      Allergies:  Carbidopa w-levodopa, Oxycodone, and Vicodin [hydrocodone-acetaminophen]      Current Outpatient Medications:     losartan (COZAAR) 25 mg tablet, Take 25 mg by mouth daily, Disp: , Rfl:     Multiple Vitamins-Minerals (CENTRUM SILVER PO), Take by mouth daily, Disp: , Rfl:     naproxen (NAPROSYN) 500 mg tablet, Take 500 mg by mouth 2 (two) times a day with meals, Disp: , Rfl:     rasagiline (AZILECT) 1 MG, Take 1 tablet by mouth DAILY, Disp: 30 tablet, Rfl: 3    rOPINIRole (REQUIP XL) 8 MG 24 hr tablet, Take 1 tablet (8 mg total) by mouth daily at bedtime, Disp: 30 tablet, Rfl: 5    tadalafil (CIALIS) 20 MG tablet, Take 1 tablet (20 mg total) by mouth as needed for erectile dysfunction, Disp: 30 tablet, Rfl: 0    docusate sodium (COLACE) 100 mg capsule, Take 1 capsule (100 mg total) by mouth 2 (two) times a day (Patient not taking: Reported on 9/15/2021), Disp: 10 capsule, Rfl: 0    polyethylene glycol (MIRALAX) 17 g packet, Take 17 g by mouth daily as needed (for constipation) (Patient not taking: Reported on 9/15/2021), Disp: 30 each, Rfl: 0    I have reviewed the past medical, social and family history, current medications, allergies, vitals, review of systems and updated this information as appropriate today     Objective:    Vitals:  Blood pressure 154/82, pulse 70, height 5' 11" (1 803 m), weight 86 5 kg (190 lb 12 8 oz)  Physical Exam    Neurological Exam  GENERAL:  Well-developed well-nourished man in no acute distress  HEENT/NECK: Head is atraumatic normocephalic, neck is supple  NEUROLOGIC:  Mental Status: Awake and alert without aphasia  Cranial Nerves: Extraocular movements are full  Face is symmetrical  Motor:  No drift is noted on arm extension  Bradykinesia is noted  Noted rest tremors noted  Cogwheeling rigidity is noted right greater than left  Coordination:  Finger-to-nose testing is performed accurately  Romberg is positive with eyes closed  Gait reveals a stooped posture with decreased arm swing  He is unable to get up out of a chair with arms folded across his chest   There is postural instability with retropulsion testing  ROS:    Review of Systems   Constitutional: Negative  Negative for appetite change and fever  HENT: Negative  Negative for hearing loss, tinnitus, trouble swallowing and voice change  Eyes: Negative  Negative for photophobia and pain  Respiratory: Negative  Negative for shortness of breath  Cardiovascular: Negative  Negative for palpitations  Gastrointestinal: Negative  Negative for nausea and vomiting  Endocrine: Negative  Negative for cold intolerance  Genitourinary: Negative  Negative for dysuria, frequency and urgency  Musculoskeletal: Positive for back pain  Negative for myalgias and neck pain  Skin: Negative  Negative for rash  Neurological: Positive for tremors and numbness  Negative for dizziness, seizures, syncope, facial asymmetry, speech difficulty, weakness, light-headedness and headaches  Patient states numbness in right foot  Patient states tremors all over and mostly in the PM   Hematological: Does not bruise/bleed easily  Psychiatric/Behavioral: Positive for sleep disturbance  Negative for confusion and hallucinations

## 2022-06-19 DIAGNOSIS — G20 PARKINSON DISEASE (HCC): ICD-10-CM

## 2022-06-20 RX ORDER — RASAGILINE 1 MG/1
1 TABLET ORAL DAILY
Qty: 30 TABLET | Refills: 0 | Status: SHIPPED | OUTPATIENT
Start: 2022-06-20 | End: 2022-08-05 | Stop reason: SDUPTHER

## 2022-08-05 DIAGNOSIS — G20 PARKINSON DISEASE (HCC): ICD-10-CM

## 2022-08-05 RX ORDER — RASAGILINE 1 MG/1
1 TABLET ORAL DAILY
Qty: 30 TABLET | Refills: 11 | Status: SHIPPED | OUTPATIENT
Start: 2022-08-05

## 2022-09-12 ENCOUNTER — TELEPHONE (OUTPATIENT)
Dept: NEUROLOGY | Facility: CLINIC | Age: 87
End: 2022-09-12

## 2022-09-21 DIAGNOSIS — G20 PARKINSON DISEASE (HCC): ICD-10-CM

## 2022-09-21 RX ORDER — ROPINIROLE 12 MG/1
12 TABLET, FILM COATED, EXTENDED RELEASE ORAL
Qty: 30 TABLET | Refills: 5 | Status: SHIPPED | OUTPATIENT
Start: 2022-09-21 | End: 2022-09-21 | Stop reason: SDUPTHER

## 2022-09-21 RX ORDER — ROPINIROLE 12 MG/1
12 TABLET, FILM COATED, EXTENDED RELEASE ORAL
Qty: 30 TABLET | Refills: 5 | Status: SHIPPED | OUTPATIENT
Start: 2022-09-21

## 2022-10-12 PROBLEM — N39.0 URINARY TRACT INFECTION: Status: RESOLVED | Noted: 2021-08-12 | Resolved: 2022-10-12

## 2023-02-24 NOTE — TELEPHONE ENCOUNTER
Chief complaint:   Chief Complaint   Patient presents with   • Leg Pain       Vitals:  Visit Vitals  /80 (BP Location: LUE - Left upper extremity, Patient Position: Sitting, Cuff Size: Large Adult)   Pulse 77   Temp 98.1 °F (36.7 °C) (Oral)   Resp 19   SpO2 96%       HISTORY OF PRESENT ILLNESS     Patient is a 46 yo male presenting to urgent care with concern for right thigh pain. He was giving himself a testosterone injection yesterday, when his finger jabbed the needle before injection. He has had gradually worsening pain and swelling. Pain does radiate to his knee. No fever or chills. No lethargy or arthralgias.       Other significant problems:  Patient Active Problem List    Diagnosis Date Noted   • Spondylosis of lumbar region without myelopathy or radiculopathy 01/18/2023     Priority: Low     1/2023: Xray  L5-S1, there are moderate degenerative changes with grade 2 anterolisthesis in the setting of pars defects, progressed from 2009.     • Acquired hypothyroidism 08/23/2021     Priority: Low   • Vitamin D deficiency 08/23/2021     Priority: Low   • Low serum testosterone level 08/23/2021     Priority: Low   • PVC's (premature ventricular contractions) 08/23/2021     Priority: Low       PAST MEDICAL, FAMILY AND SOCIAL HISTORY     Medications:  Current Outpatient Medications   Medication Sig Dispense Refill   • doxycycline hyclate (VIBRAMYCIN) 100 MG capsule Take 1 capsule by mouth in the morning and 1 capsule in the evening. 20 capsule 0   • levothyroxine 50 MCG tablet Take 1 tablet by mouth daily. 90 tablet 1   • TESTOSTERONE CYPIONATE IM Inject 1 mL into the muscle 1 day a week.     • methylPREDNISolone (MEDROL, BOB,) 4 MG tablet Take 1 tablet by mouth as directed. follow package directions 21 tablet 0   • cyclobenzaprine (FLEXERIL) 10 MG tablet Take 1 tablet by mouth 3 times daily as needed for Muscle spasms. 30 tablet 0   • naproxen (NAPROSYN) 375 MG tablet Take 1 tablet by mouth in the morning and  Pt calling back and states that his condition has gotten worse  Pt experiencing more tremors, trouble moving and walking, balance is off  Pt is requesting an increase in medication  Freezing? Yes, can't turn, pretty constant  Shuffling? Yes, pretty constant  Difficulty Walking? Yes, uses cane, pretty constant  Recent Falls? No, couple of times near miss  Any extra movements? Yes, facial grimacing every night  Any Hallucinations? No    What time of day are symptoms worse? Worse in the evening around 8pm     Current medications confirmed as:  Ropinerole 6 mg takes 1 tablet daily at HS  Rasagiline 1 mg takes 1 tablet daily in the AM    Ask how long after each dose do they feel that it "wears off"? Pt feels medication not effective and he suffers from symptoms pretty constantly  Does patient have a DBS? No    Dr Richard Guevara - Please advise  Cory  [phone], ok to leave detailed message  1 tablet in the evening. Take with meals. 60 tablet 1   • OMEPRAZOLE PO Take 20 mg by mouth as needed. OTC       No current facility-administered medications for this visit.       Allergies:  ALLERGIES:   Allergen Reactions   • Morphine SHORTNESS OF BREATH       Past Medical  History/Surgeries:  Past Medical History:   Diagnosis Date   • Hypothyroid    • PVC (premature ventricular contraction)     holter monitor 2018       Past Surgical History:   Procedure Laterality Date   • Anesth,arm/elbow bones,open surgery     • Leg surgery         Family History:  Family History   Problem Relation Age of Onset   • Suicide Father    • Cancer Sister    • Cancer Maternal Grandmother    • Cancer Maternal Grandfather    • Cancer Paternal Grandfather        Social History:  Social History     Tobacco Use   • Smoking status: Former     Packs/day: 0.25     Years: 18.00     Pack years: 4.50     Types: Cigarettes     Start date: 1998     Quit date: 2016     Years since quittin.1   • Smokeless tobacco: Never   Substance Use Topics   • Alcohol use: Yes     Alcohol/week: 2.0 standard drinks     Types: 2 Standard drinks or equivalent per week       REVIEW OF SYSTEMS     Review of Systems   Constitutional: Negative for activity change, chills, diaphoresis and fever.   HENT: Negative for sore throat and trouble swallowing.    Eyes: Negative for pain, discharge, redness and visual disturbance.   Respiratory: Negative for cough and shortness of breath.    Cardiovascular: Negative for chest pain, palpitations and leg swelling.   Gastrointestinal: Negative for abdominal pain, diarrhea, nausea and vomiting.   Endocrine: Negative for polydipsia.   Genitourinary: Negative for dysuria, frequency and urgency.   Musculoskeletal: Positive for myalgias. Negative for arthralgias and joint swelling.   Skin: Positive for color change. Negative for rash.   Allergic/Immunologic: Negative for immunocompromised state.   Neurological: Negative for  weakness, numbness and headaches.   Psychiatric/Behavioral: Negative for behavioral problems.       PHYSICAL EXAM     Physical Exam  Vitals and nursing note reviewed.   Constitutional:       General: He is not in acute distress.     Appearance: He is well-developed. He is not ill-appearing, toxic-appearing or diaphoretic.   HENT:      Head: Normocephalic and atraumatic.      Neck: Normal range of motion and neck supple.   Cardiovascular:      Rate and Rhythm: Normal rate and regular rhythm.      Heart sounds: Normal heart sounds.   Pulmonary:      Effort: Pulmonary effort is normal. No respiratory distress.      Breath sounds: Normal breath sounds.   Musculoskeletal:         General: Swelling present. Normal range of motion.      Right hip: Tenderness present. No bony tenderness. Normal range of motion.        Legs:    Skin:     General: Skin is warm and dry.      Findings: Erythema present.          Neurological:      Mental Status: He is alert and oriented to person, place, and time.      Cranial Nerves: No cranial nerve deficit.      Coordination: Coordination normal.         ASSESSMENT/PLAN     Cellulitis of right lower extremity  (primary encounter diagnosis)      1. Discussed with patient need for antibiotics.   2. Ordered doxycycline as he did well on this in the past.   3. Discussed f/u with PCP if no improvement.

## 2023-12-21 ENCOUNTER — HOSPITAL ENCOUNTER (OUTPATIENT)
Dept: RADIOLOGY | Facility: HOSPITAL | Age: 88
End: 2023-12-21
Payer: COMMERCIAL

## 2023-12-21 DIAGNOSIS — J38.3 VOCAL CORD DYSFUNCTION: ICD-10-CM

## 2023-12-21 DIAGNOSIS — R13.10 DYSPHAGIA, UNSPECIFIED TYPE: ICD-10-CM

## 2023-12-21 PROCEDURE — 74230 X-RAY XM SWLNG FUNCJ C+: CPT

## 2023-12-21 PROCEDURE — 92611 MOTION FLUOROSCOPY/SWALLOW: CPT

## 2023-12-21 NOTE — PROCEDURES
Speech Pathology - Modified Barium Swallow Study    Patient Name: Momo Swann    Today's Date: 12/21/2023     Problem List  Active Problems:  There are no active Hospital Problems.      Past Medical History  Past Medical History:   Diagnosis Date    Arthritis     Bladder stones     Hypertension     Parkinson disease        Past Surgical History  Past Surgical History:   Procedure Laterality Date    COLONOSCOPY      FOOT SURGERY      TX CYSTOLITHOTOMY CYSTOTOMY W/RMVL CALCULUS N/A 8/5/2021    Procedure: CYSTOLITHOTOMY OPEN;  Surgeon: Adiel Niño MD;  Location: [facility];  Service: Urology    TX TRURL ELECTROSURG RESCJ PROSTATE BLEED COMPLETE N/A 8/5/2021    Procedure: TRANSURETHRAL RESECTION OF PROSTATE (TURP);  Surgeon: Adiel Niño MD;  Location: [facility];  Service: Urology    TONSILLECTOMY         Assessment Summary:    Pt presents with mild oropharyngeal dysphagia characterized by prolonged mastication and oral transfer w/ occasional lingual rocking motion.  Swallow initiation is delayed yet generally functional given pt's age.  Collection of pharyngeal retention in the pyriforms and UES.  Note, endoscopy unremarkable for narrowing/oropharyngeal obstruction.  No mention of diverticulum.  Given frequent UES retention, may consider ENT /GI follow up with closer look at upper esophagus r/o  small diverticulum.  Noted small cervical osteophytes C4-5, C6-7 which may be contributing to retention when taking larger quantities (I.e.; mealtime).  No aspiration, penetration or obstruction in bolus flow during the present assessment.  Material cleared with use of liquid wash, dbl swallow and effortful swallows.     Note: Images are available for review in PACS as desired.    Recommendations:   Recommended Diet: soft/level 3 diet and thin liquids   Recommended Form of Medications: whole with liquid and whole with puree - cut/crush if medically cleared   Aspiration precautions and compensatory swallowing  strategies: upright posture, small bites/sips, and alternating bites and sips  Consider referral to:  ENT  SLP Dysphagia therapy recommended: Y- outpatient; consider LSVT/Speak OUT! For concerns of low vocal volume in the setting of Parkinson's disease.      Results Reviewed with: patient, MD, and family   Pt/Family Education: initiated.     General Information;  Pt is a 88 y.o. male with a PMH remarkable for parkinson's disease and acid reflux who presents with difficulty swallowing, globus sensation, and difficulty breathing for 1 month.He states the sensation is intermittent and progressive. It is worse when laying down in the evening. He is still able to eat and drink with a modified diet and finds it difficult to take pills stating they get stuck in his throat. He has associated sore throat, cough, snoring, nausea, and vomiting.He has never seen a GI, had an endoscopy, or a swallow study, but he did see speech language pathologist yesterday 11/29/23 who recommended getting an endoscopy and swallow study. He denies any history of cancer or GI problems. He takes no medications for reflux symptoms. He had a tonsillectomy in the past and had multiple teeth pulled. He denies use of alcohol, tobacco, and illicit drugs.He denies weight loss, fever/chills, sleep apnea, or appetite loss.   MBS was recommended to assess oropharyngeal stage swallowing skills at this time.     Prior MBS: none on file     Oral Mechanism Exam  Facial: symmetrical  Labial: decreased ROM left side  Lingual: WFL  Velum: symmetrical  Mandible: adequate ROM  Dentition: adequate  Vocal quality: weak   Volitional Cough: strong/productive   Respiratory Status: on RA   Tracheostomy: n/a    Pt was viewed sitting upright in the lateral and AP positions. Trials administered were consistent with MBSImP Validated Protocol: Pt was given 5-mL thin liquid x2, 20-mL cup sip thin, 40-mL sequential swallow thin, 5-mL nectar thick, 20-mL cup sip nectar thick,  5-mL honey thick, 5-mL pudding, ½ cookie coated with 3-mL pudding, and 5-mL pudding in the AP position. Pt was also given thin liquids by straw, as well as a barium tablet with (thin liquid/pudding).     Initial view observations/comments: Cervical bony growths      8-Point Penetration-Aspiration Scale   Thin liquid 1 - Material does not enter the airway   Nectar thick liquid 1 - Material does not enter the airway   Honey thick liquid 1 - Material does not enter the airway   Puree (pudding) 1 - Material does not enter the airway   Solid 1 - Material does not enter the airway     Strategies and Efficacy: effortful swallow, double swallow - effective in clearing majority of pharyngeal retention     Aspiration Response and Efficacy:  NA    MBS IMP Rating    ORAL Impairment  Compinent 1--Lip Closure  Judged at any point during the swallow.  2 - Escape from interlabial space or lateral juncture; no extension beyond vermilion border    Component 2--Tongue Control During Bolus Hold  Judged on held liquid boluses only and prior to productive tongue movement.   1 - Escape to lateral buccal cavity/floor of mouth (FOM)    Component 3--Bolus Preparation/Mastication  Judged only during presentation of 1/2 shortbread cookie coated in pudding.   2 - Disorganized chewing/mashing with solid pieces of bolus unchewed    Component 4--Bolus Transport/Lingual Motion  Judged after first productive tongue movement for oral bolus transport.  3 - Repetitive/disorganized tongue motion    Component 5--Oral Residue  Judged after first swallow or after the last swallow of the sequential swallow task.  2 - Residue collection on oral structures   Location   B - Palate and C - Tongue    Component 6--Initiation of Pharyngeal Swallow  Judged at first movement of the brisk superior-anterior hyoid trajectory.  1 - Bolus Head in valleculae      PHARYNGEAL Impairment  Component 7--Soft Palate Elevation  Judged during maximum displacement of soft  palate.  0 - No bolus between the soft palate (SP)/pharyngeal wall (PW)    Component 8--Laryngeal Elevation  Judged when epiglottis is in its most horizontal position.  0 - Complete superior movement of thyroid cartilage with complete approximation of arytenoids to epiglottic petiole    Component 9--Anterior Hyoid Excursion  Judged at height of swallow/maximal anterior hyoid displacement.  1 - Partial anterior movement    Component 10--Epiglottic Movement  Judged at height of swallow/maximal anterior hyoid displacement.  0 - Complete inversion    Component 11--Laryngeal Vestibular Closure  Judged at height of swallow/maximal anterior hyoid displacement.  0 - Complete; no air/contrast in laryngeal vestibule    Component 12--Pharyngeal Stripping Wave  Judged during the full duration of the pharyngeal swallow.  0 - Present - complete    Component 13--Pharyngeal Contraction  Judged in AP view at rest and throughout maximum movement of structures.  2 - Unilateral Bulging    Component 14--Pharyngoesophageal Segment Opening  Judged during maximum distension of PES and throughout opening and closure.  1 - Partial distension/partial duration; partial obstruction to flow    Component 15--Tongue Base (TB) Retraction  Judged during maximum retraction of the tongue base.  2 - Narrow column of contrast or air between TB and PW    Component 16--Pharyngeal Residue  Judged after first swallow or after the last swallow of the sequential swallow task.  2 - Collection of residue within or on pharyngeal structures   Location   F - Diffuse (>3 areas)      ESOPHAGEAL Impairment  Component 17--Esophageal Clearance Upright Position  Judged in AP view during bolus transit through the oral cavity to the LES  0 - Complete clearance; esophageal coating         Catarina Beebe MS, CCC-SLP  Speech-Language Pathologist  [place] #[id number]  [place] #[id number]

## 2024-02-11 ENCOUNTER — APPOINTMENT (EMERGENCY)
Dept: RADIOLOGY | Facility: HOSPITAL | Age: 89
End: 2024-02-11
Payer: COMMERCIAL

## 2024-02-11 ENCOUNTER — APPOINTMENT (EMERGENCY)
Dept: CT IMAGING | Facility: HOSPITAL | Age: 89
End: 2024-02-11
Payer: COMMERCIAL

## 2024-02-11 ENCOUNTER — OFFICE VISIT (OUTPATIENT)
Dept: URGENT CARE | Facility: MEDICAL CENTER | Age: 89
End: 2024-02-11
Payer: COMMERCIAL

## 2024-02-11 ENCOUNTER — HOSPITAL ENCOUNTER (EMERGENCY)
Facility: HOSPITAL | Age: 89
Discharge: HOME | End: 2024-02-11
Attending: EMERGENCY MEDICINE | Admitting: EMERGENCY MEDICINE
Payer: COMMERCIAL

## 2024-02-11 VITALS
DIASTOLIC BLOOD PRESSURE: 85 MMHG | SYSTOLIC BLOOD PRESSURE: 154 MMHG | WEIGHT: 185 LBS | HEIGHT: 72 IN | HEART RATE: 77 BPM | TEMPERATURE: 97.6 F | RESPIRATION RATE: 18 BRPM | OXYGEN SATURATION: 96 % | BODY MASS INDEX: 25.06 KG/M2

## 2024-02-11 VITALS
RESPIRATION RATE: 19 BRPM | SYSTOLIC BLOOD PRESSURE: 133 MMHG | HEART RATE: 70 BPM | DIASTOLIC BLOOD PRESSURE: 76 MMHG | TEMPERATURE: 97.8 F | OXYGEN SATURATION: 97 %

## 2024-02-11 DIAGNOSIS — R42 LIGHTHEADEDNESS: ICD-10-CM

## 2024-02-11 DIAGNOSIS — Z87.19 HISTORY OF GASTROESOPHAGEAL REFLUX (GERD): ICD-10-CM

## 2024-02-11 DIAGNOSIS — K57.90 DIVERTICULOSIS: ICD-10-CM

## 2024-02-11 DIAGNOSIS — R11.2 NAUSEA AND VOMITING, UNSPECIFIED VOMITING TYPE: Primary | ICD-10-CM

## 2024-02-11 DIAGNOSIS — R10.9 ABDOMINAL PAIN: Primary | ICD-10-CM

## 2024-02-11 DIAGNOSIS — Z91.89 AT RISK FOR DEHYDRATION: ICD-10-CM

## 2024-02-11 DIAGNOSIS — R11.2 NAUSEA AND VOMITING: ICD-10-CM

## 2024-02-11 DIAGNOSIS — E86.0 DEHYDRATION: ICD-10-CM

## 2024-02-11 DIAGNOSIS — K59.00 CONSTIPATION: ICD-10-CM

## 2024-02-11 DIAGNOSIS — R42 DIZZY: ICD-10-CM

## 2024-02-11 PROBLEM — N40.0 BPH (BENIGN PROSTATIC HYPERPLASIA): Status: ACTIVE | Noted: 2021-09-16

## 2024-02-11 PROBLEM — M79.671 HEEL PAIN, CHRONIC, RIGHT: Status: ACTIVE | Noted: 2019-06-04

## 2024-02-11 PROBLEM — N40.1 BENIGN PROSTATIC HYPERPLASIA WITH URINARY FREQUENCY: Status: ACTIVE | Noted: 2019-12-05

## 2024-02-11 PROBLEM — M48.062 SPINAL STENOSIS OF LUMBAR REGION WITH NEUROGENIC CLAUDICATION: Status: ACTIVE | Noted: 2020-06-29

## 2024-02-11 PROBLEM — E78.2 MIXED HYPERLIPIDEMIA: Status: ACTIVE | Noted: 2018-06-05

## 2024-02-11 PROBLEM — N20.0 KIDNEY STONES: Status: ACTIVE | Noted: 2018-08-26

## 2024-02-11 PROBLEM — M62.561 ATROPHY OF MUSCLE OF BOTH LOWER LEGS: Status: ACTIVE | Noted: 2020-06-09

## 2024-02-11 PROBLEM — M62.562 ATROPHY OF MUSCLE OF BOTH LOWER LEGS: Status: ACTIVE | Noted: 2020-06-09

## 2024-02-11 PROBLEM — M19.90 OSTEOARTHRITIS: Status: ACTIVE | Noted: 2018-06-05

## 2024-02-11 PROBLEM — R26.9 GAIT DISTURBANCE: Status: ACTIVE | Noted: 2020-06-09

## 2024-02-11 PROBLEM — R35.0 BENIGN PROSTATIC HYPERPLASIA WITH URINARY FREQUENCY: Status: ACTIVE | Noted: 2019-12-05

## 2024-02-11 PROBLEM — H40.9 GLAUCOMA: Status: ACTIVE | Noted: 2018-06-05

## 2024-02-11 PROBLEM — R97.20 ELEVATED PSA: Status: ACTIVE | Noted: 2018-12-06

## 2024-02-11 PROBLEM — R13.10 DYSPHAGIA: Status: ACTIVE | Noted: 2023-11-21

## 2024-02-11 PROBLEM — G89.29 HEEL PAIN, CHRONIC, RIGHT: Status: ACTIVE | Noted: 2019-06-04

## 2024-02-11 PROBLEM — K63.5 COLON POLYP: Status: ACTIVE | Noted: 2018-06-05

## 2024-02-11 PROBLEM — K57.30 DIVERTICULAR DISEASE OF COLON: Status: ACTIVE | Noted: 2018-06-05

## 2024-02-11 PROBLEM — J30.9 ALLERGIC RHINITIS: Status: ACTIVE | Noted: 2018-06-05

## 2024-02-11 PROBLEM — G25.81 RESTLESS LEG SYNDROME: Status: ACTIVE | Noted: 2023-08-25

## 2024-02-11 PROBLEM — D36.9 TUBULAR ADENOMA: Status: ACTIVE | Noted: 2021-09-16

## 2024-02-11 PROBLEM — R55 NEAR SYNCOPE: Status: ACTIVE | Noted: 2021-05-11

## 2024-02-11 LAB
2HR DELTA HS TROPONIN: 0 NG/L
ALBUMIN SERPL BCP-MCNC: 4.6 G/DL (ref 3.5–5)
ALP SERPL-CCNC: 44 U/L (ref 34–104)
ALT SERPL W P-5'-P-CCNC: <3 U/L (ref 7–52)
ANION GAP SERPL CALCULATED.3IONS-SCNC: 7 MMOL/L
APTT PPP: 32 SECONDS (ref 23–37)
AST SERPL W P-5'-P-CCNC: 20 U/L (ref 13–39)
BASOPHILS # BLD AUTO: 0.02 THOUSANDS/ÂΜL (ref 0–0.1)
BASOPHILS NFR BLD AUTO: 0 % (ref 0–1)
BILIRUB SERPL-MCNC: 0.94 MG/DL (ref 0.2–1)
BNP SERPL-MCNC: 43 PG/ML (ref 0–100)
BUN SERPL-MCNC: 16 MG/DL (ref 5–25)
CALCIUM SERPL-MCNC: 9.6 MG/DL (ref 8.4–10.2)
CARDIAC TROPONIN I PNL SERPL HS: 3 NG/L
CARDIAC TROPONIN I PNL SERPL HS: 3 NG/L
CHLORIDE SERPL-SCNC: 107 MMOL/L (ref 96–108)
CO2 SERPL-SCNC: 25 MMOL/L (ref 21–32)
CREAT SERPL-MCNC: 0.9 MG/DL (ref 0.6–1.3)
EOSINOPHIL # BLD AUTO: 0.03 THOUSAND/ÂΜL (ref 0–0.61)
EOSINOPHIL NFR BLD AUTO: 1 % (ref 0–6)
ERYTHROCYTE [DISTWIDTH] IN BLOOD BY AUTOMATED COUNT: 12.4 % (ref 11.6–15.1)
FLUAV RNA RESP QL NAA+PROBE: NEGATIVE
FLUBV RNA RESP QL NAA+PROBE: NEGATIVE
GFR SERPL CREATININE-BSD FRML MDRD: 77 ML/MIN/1.73SQ M
GLUCOSE SERPL-MCNC: 107 MG/DL (ref 65–140)
GLUCOSE SERPL-MCNC: 110 MG/DL (ref 65–140)
HCT VFR BLD AUTO: 43.7 % (ref 36.5–49.3)
HGB BLD-MCNC: 15.4 G/DL (ref 12–17)
IMM GRANULOCYTES # BLD AUTO: 0.03 THOUSAND/UL (ref 0–0.2)
IMM GRANULOCYTES NFR BLD AUTO: 1 % (ref 0–2)
INR PPP: 0.96 (ref 0.84–1.19)
LACTATE SERPL-SCNC: 0.9 MMOL/L (ref 0.5–2)
LIPASE SERPL-CCNC: 19 U/L (ref 11–82)
LYMPHOCYTES # BLD AUTO: 0.64 THOUSANDS/ÂΜL (ref 0.6–4.47)
LYMPHOCYTES NFR BLD AUTO: 12 % (ref 14–44)
MCH RBC QN AUTO: 33 PG (ref 26.8–34.3)
MCHC RBC AUTO-ENTMCNC: 35.2 G/DL (ref 31.4–37.4)
MCV RBC AUTO: 94 FL (ref 82–98)
MONOCYTES # BLD AUTO: 0.25 THOUSAND/ÂΜL (ref 0.17–1.22)
MONOCYTES NFR BLD AUTO: 5 % (ref 4–12)
NEUTROPHILS # BLD AUTO: 4.44 THOUSANDS/ÂΜL (ref 1.85–7.62)
NEUTS SEG NFR BLD AUTO: 81 % (ref 43–75)
NRBC BLD AUTO-RTO: 0 /100 WBCS
PLATELET # BLD AUTO: 178 THOUSANDS/UL (ref 149–390)
PMV BLD AUTO: 10.4 FL (ref 8.9–12.7)
POTASSIUM SERPL-SCNC: 4.4 MMOL/L (ref 3.5–5.3)
PROT SERPL-MCNC: 6.8 G/DL (ref 6.4–8.4)
PROTHROMBIN TIME: 13.4 SECONDS (ref 11.6–14.5)
RBC # BLD AUTO: 4.66 MILLION/UL (ref 3.88–5.62)
RSV RNA RESP QL NAA+PROBE: NEGATIVE
SARS-COV-2 RNA RESP QL NAA+PROBE: NEGATIVE
SODIUM SERPL-SCNC: 139 MMOL/L (ref 135–147)
WBC # BLD AUTO: 5.41 THOUSAND/UL (ref 4.31–10.16)

## 2024-02-11 PROCEDURE — 99213 OFFICE O/P EST LOW 20 MIN: CPT

## 2024-02-11 PROCEDURE — 99285 EMERGENCY DEPT VISIT HI MDM: CPT

## 2024-02-11 PROCEDURE — 85610 PROTHROMBIN TIME: CPT | Performed by: EMERGENCY MEDICINE

## 2024-02-11 PROCEDURE — 36415 COLL VENOUS BLD VENIPUNCTURE: CPT | Performed by: EMERGENCY MEDICINE

## 2024-02-11 PROCEDURE — G1004 CDSM NDSC: HCPCS

## 2024-02-11 PROCEDURE — 71045 X-RAY EXAM CHEST 1 VIEW: CPT

## 2024-02-11 PROCEDURE — C9113 INJ PANTOPRAZOLE SODIUM, VIA: HCPCS | Performed by: EMERGENCY MEDICINE

## 2024-02-11 PROCEDURE — 96365 THER/PROPH/DIAG IV INF INIT: CPT

## 2024-02-11 PROCEDURE — 80053 COMPREHEN METABOLIC PANEL: CPT | Performed by: EMERGENCY MEDICINE

## 2024-02-11 PROCEDURE — 93005 ELECTROCARDIOGRAM TRACING: CPT

## 2024-02-11 PROCEDURE — 96375 TX/PRO/DX INJ NEW DRUG ADDON: CPT

## 2024-02-11 PROCEDURE — 99285 EMERGENCY DEPT VISIT HI MDM: CPT | Performed by: EMERGENCY MEDICINE

## 2024-02-11 PROCEDURE — 83690 ASSAY OF LIPASE: CPT | Performed by: EMERGENCY MEDICINE

## 2024-02-11 PROCEDURE — 83880 ASSAY OF NATRIURETIC PEPTIDE: CPT | Performed by: EMERGENCY MEDICINE

## 2024-02-11 PROCEDURE — 82948 REAGENT STRIP/BLOOD GLUCOSE: CPT

## 2024-02-11 PROCEDURE — 0241U HB NFCT DS VIR RESP RNA 4 TRGT: CPT | Performed by: EMERGENCY MEDICINE

## 2024-02-11 PROCEDURE — 84484 ASSAY OF TROPONIN QUANT: CPT | Performed by: EMERGENCY MEDICINE

## 2024-02-11 PROCEDURE — 85025 COMPLETE CBC W/AUTO DIFF WBC: CPT | Performed by: EMERGENCY MEDICINE

## 2024-02-11 PROCEDURE — 74177 CT ABD & PELVIS W/CONTRAST: CPT

## 2024-02-11 PROCEDURE — 83605 ASSAY OF LACTIC ACID: CPT | Performed by: EMERGENCY MEDICINE

## 2024-02-11 PROCEDURE — 85730 THROMBOPLASTIN TIME PARTIAL: CPT | Performed by: EMERGENCY MEDICINE

## 2024-02-11 RX ORDER — ONDANSETRON 4 MG/1
4 TABLET, ORALLY DISINTEGRATING ORAL EVERY 6 HOURS PRN
Qty: 12 TABLET | Refills: 0 | Status: SHIPPED | OUTPATIENT
Start: 2024-02-11

## 2024-02-11 RX ORDER — POLYETHYLENE GLYCOL 3350 17 G/17G
17 POWDER, FOR SOLUTION ORAL AS NEEDED
Qty: 578 G | Refills: 0 | Status: SHIPPED | OUTPATIENT
Start: 2024-02-11

## 2024-02-11 RX ORDER — ALUMINUM HYDROXIDE, MAGNESIUM HYDROXIDE, SIMETHICONE 400; 400; 40 MG/10ML; MG/10ML; MG/10ML
30 SUSPENSION ORAL 4 TIMES DAILY PRN
Qty: 355 ML | Refills: 0 | Status: SHIPPED | OUTPATIENT
Start: 2024-02-11 | End: 2024-02-11 | Stop reason: ALTCHOICE

## 2024-02-11 RX ORDER — FAMOTIDINE 10 MG/ML
20 INJECTION, SOLUTION INTRAVENOUS ONCE
Status: COMPLETED | OUTPATIENT
Start: 2024-02-11 | End: 2024-02-11

## 2024-02-11 RX ORDER — MELOXICAM 15 MG/1
TABLET ORAL
COMMUNITY
Start: 2024-01-31

## 2024-02-11 RX ORDER — SUCRALFATE 1 G/1
1 TABLET ORAL 3 TIMES DAILY
Qty: 60 TABLET | Refills: 0 | Status: SHIPPED | OUTPATIENT
Start: 2024-02-11

## 2024-02-11 RX ORDER — PANTOPRAZOLE SODIUM 40 MG/10ML
40 INJECTION, POWDER, LYOPHILIZED, FOR SOLUTION INTRAVENOUS ONCE
Status: COMPLETED | OUTPATIENT
Start: 2024-02-11 | End: 2024-02-11

## 2024-02-11 RX ORDER — ONDANSETRON 4 MG/1
4 TABLET, FILM COATED ORAL EVERY 12 HOURS PRN
Qty: 20 TABLET | Refills: 0 | Status: SHIPPED | OUTPATIENT
Start: 2024-02-11 | End: 2024-02-11 | Stop reason: ALTCHOICE

## 2024-02-11 RX ORDER — HYDROMORPHONE HCL IN WATER/PF 6 MG/30 ML
0.2 PATIENT CONTROLLED ANALGESIA SYRINGE INTRAVENOUS ONCE AS NEEDED
Status: DISCONTINUED | OUTPATIENT
Start: 2024-02-11 | End: 2024-02-11 | Stop reason: HOSPADM

## 2024-02-11 RX ORDER — FAMOTIDINE 20 MG/1
20 TABLET, FILM COATED ORAL 2 TIMES DAILY
Qty: 30 TABLET | Refills: 0 | Status: SHIPPED | OUTPATIENT
Start: 2024-02-11 | End: 2024-02-15 | Stop reason: ALTCHOICE

## 2024-02-11 RX ORDER — ONDANSETRON 4 MG/1
4 TABLET, ORALLY DISINTEGRATING ORAL ONCE
Status: COMPLETED | OUTPATIENT
Start: 2024-02-11 | End: 2024-02-11

## 2024-02-11 RX ORDER — LEVODOPA AND CARBIDOPA 195; 48.75 MG/1; MG/1
CAPSULE, EXTENDED RELEASE ORAL
COMMUNITY
Start: 2024-02-05

## 2024-02-11 RX ORDER — ONDANSETRON 2 MG/ML
4 INJECTION INTRAMUSCULAR; INTRAVENOUS ONCE
Status: COMPLETED | OUTPATIENT
Start: 2024-02-11 | End: 2024-02-11

## 2024-02-11 RX ADMIN — IOHEXOL 100 ML: 350 INJECTION, SOLUTION INTRAVENOUS at 13:18

## 2024-02-11 RX ADMIN — SODIUM CHLORIDE, SODIUM LACTATE, POTASSIUM CHLORIDE, AND CALCIUM CHLORIDE 1000 ML: .6; .31; .03; .02 INJECTION, SOLUTION INTRAVENOUS at 15:38

## 2024-02-11 RX ADMIN — PANTOPRAZOLE SODIUM 40 MG: 40 INJECTION, POWDER, FOR SOLUTION INTRAVENOUS at 12:51

## 2024-02-11 RX ADMIN — ONDANSETRON 4 MG: 4 TABLET, ORALLY DISINTEGRATING ORAL at 11:42

## 2024-02-11 RX ADMIN — FAMOTIDINE 20 MG: 10 INJECTION, SOLUTION INTRAVENOUS at 12:51

## 2024-02-11 RX ADMIN — ONDANSETRON 4 MG: 2 INJECTION INTRAMUSCULAR; INTRAVENOUS at 12:51

## 2024-02-11 NOTE — ED PROVIDER NOTES
"Pt Name: Momo Swann  MRN: [id number]  Birthdate 1935  Age/Sex: 88 y.o. male  Date of evaluation: 2/11/2024  PCP: Iain Martell DO    CHIEF COMPLAINT    Chief Complaint   Patient presents with    Abdominal Pain    Dizziness     Patient \"sent to ED from urgent care for abd pain, nausea, vomiting and episode of dizziness that occurred at urgent care today\" patient denies dizziness, cp or sob at this time         HPI    88 y.o. male presenting with nausea vomiting and abdominal pain as well as lightheadedness.  Patient states has been having intermittent abdominal pain over the past few weeks, much worse today.  He has been unable to keep down any food or fluids since yesterday afternoon, vomiting any time that he tries to take anything in.  The pain is currently dull, moderate to severe, in the epigastric region, radiating throughout the stomach, worse with vomiting or trying to eat better at rest.  He notes several episodes of lightheadedness, each 1 after an episode of vomiting or is hunched forward, with lightheadedness occurring after he straightened up.  He denies chest pain, shortness of breath, fever, trauma, other symptoms.  No sick contacts or recent travel, no recent antibiotic use.      HPI      Past Medical and Surgical History    Past Medical History:   Diagnosis Date    Arthritis     Bladder stones     Hypertension     Parkinson disease        Past Surgical History:   Procedure Laterality Date    COLONOSCOPY      FOOT SURGERY      NV CYSTOLITHOTOMY CYSTOTOMY W/RMVL CALCULUS N/A 8/5/2021    Procedure: CYSTOLITHOTOMY OPEN;  Surgeon: Adiel Niño MD;  Location: [facility];  Service: Urology    NV TRURL ELECTROSURG RESCJ PROSTATE BLEED COMPLETE N/A 8/5/2021    Procedure: TRANSURETHRAL RESECTION OF PROSTATE (TURP);  Surgeon: Adiel Niño MD;  Location: [facility];  Service: Urology    TONSILLECTOMY         Family History   Problem Relation Age of Onset    No Known Problems Mother     Lung " cancer Father     No Known Problems Sister     No Known Problems Sister     Lung cancer Sister        Social History     Tobacco Use    Smoking status: Never    Smokeless tobacco: Never   Vaping Use    Vaping status: Never Used   Substance Use Topics    Alcohol use: Never    Drug use: Never           Allergies    Allergies   Allergen Reactions    Carbidopa W-Levodopa GI Intolerance     Takes daily    Oxycodone Vomiting    Vicodin [Hydrocodone-Acetaminophen] Vomiting       Home Medications    Prior to Admission medications    Medication Sig Start Date End Date Taking? Authorizing Provider   docusate sodium (COLACE) 100 mg capsule Take 1 capsule (100 mg total) by mouth 2 (two) times a day 8/14/21   Elvia Vega PA-C   famotidine (PEPCID) 20 mg/2.5 mL oral suspension Take 5 mL (40 mg total) by mouth daily at bedtime 12/4/23 1/3/24  Mini Harrison PA-C   losartan (COZAAR) 25 mg tablet Take 25 mg by mouth daily    Historical Provider, MD   meloxicam (MOBIC) 15 mg tablet  1/31/24   Historical Provider, MD   Multiple Vitamins-Minerals (CENTRUM SILVER PO) Take by mouth daily    Historical Provider, MD   Rytary 48. MG CPCR  2/5/24   Historical Provider, MD   tadalafil (CIALIS) 20 MG tablet Take 1 tablet (20 mg total) by mouth as needed for erectile dysfunction 11/19/21   Muna Perez PA-C   aluminum-magnesium hydroxide-simethicone (MAALOX) 200-200-20 MG/5ML SUSP Take 30 mL by mouth 4 (four) times a day as needed for heartburn or indigestion 2/11/24 2/11/24  BLAIR Baires   gabapentin (NEURONTIN) 100 mg capsule 1-3 pills 3 times daily as directed  Patient not taking: Reported on 11/30/2023 5/4/22 2/11/24  Alexandr Davidson MD   ondansetron (ZOFRAN) 4 mg tablet Take 1 tablet (4 mg total) by mouth every 12 (twelve) hours as needed for nausea or vomiting 2/11/24 2/11/24  BLAIR Baires   polyethylene glycol (MIRALAX) 17 g packet Take 17 g by mouth daily as needed (for constipation)  Patient not taking:  Reported on 9/15/2021 8/14/21 2/11/24  Elvia Vega PA-C           Review of Systems    Review of Systems   Constitutional:  Negative for appetite change, chills and diaphoresis.   HENT:  Negative for drooling, facial swelling, trouble swallowing and voice change.    Respiratory:  Negative for apnea, shortness of breath and wheezing.    Cardiovascular:  Negative for chest pain and leg swelling.   Gastrointestinal:  Positive for abdominal pain, nausea and vomiting. Negative for abdominal distention and diarrhea.   Genitourinary:  Negative for dysuria and urgency.   Musculoskeletal:  Negative for arthralgias, back pain, gait problem and neck pain.   Skin:  Negative for color change, rash and wound.   Neurological:  Positive for light-headedness. Negative for seizures, speech difficulty, weakness and headaches.   Psychiatric/Behavioral:  Negative for agitation, behavioral problems and dysphoric mood. The patient is not nervous/anxious.            All other systems reviewed and negative.    Physical Exam      ED Triage Vitals [02/11/24 1157]   Temperature Pulse Respirations Blood Pressure SpO2   97.8 °F (36.6 °C) 78 18 165/81 97 %      Temp Source Heart Rate Source Patient Position - Orthostatic VS BP Location FiO2 (%)   Oral Monitor Sitting Left arm --      Pain Score       --               Physical Exam  Vitals and nursing note reviewed.   Constitutional:       General: He is not in acute distress.     Appearance: He is well-developed. He is not ill-appearing, toxic-appearing or diaphoretic.   HENT:      Head: Normocephalic and atraumatic.      Right Ear: External ear normal.      Left Ear: External ear normal.      Nose: Nose normal. No congestion or rhinorrhea.      Mouth/Throat:      Mouth: Mucous membranes are dry.      Pharynx: Oropharynx is clear. No oropharyngeal exudate or posterior oropharyngeal erythema.   Eyes:      Conjunctiva/sclera: Conjunctivae normal.      Pupils: Pupils are equal, round, and reactive  to light.   Neck:      Trachea: No tracheal deviation.   Cardiovascular:      Rate and Rhythm: Normal rate and regular rhythm.      Pulses: Normal pulses.      Heart sounds: Normal heart sounds. No murmur heard.  Pulmonary:      Effort: Pulmonary effort is normal. No respiratory distress.      Breath sounds: Normal breath sounds. No stridor. No wheezing or rales.   Abdominal:      General: There is no distension.      Palpations: Abdomen is soft.      Tenderness: There is abdominal tenderness. There is no guarding or rebound.      Comments: Tender to palpation the right upper quadrant and epigastric region, equivocal Dubois sign.  No rebound or guarding   Musculoskeletal:         General: No deformity. Normal range of motion.      Cervical back: Normal range of motion and neck supple. No rigidity or tenderness.      Right lower leg: No edema.      Left lower leg: No edema.   Skin:     General: Skin is warm and dry.      Capillary Refill: Capillary refill takes less than 2 seconds.      Findings: No rash.   Neurological:      Mental Status: He is alert and oriented to person, place, and time.      Cranial Nerves: No cranial nerve deficit.      Motor: No weakness.      Coordination: Coordination normal.   Psychiatric:         Behavior: Behavior normal.         Thought Content: Thought content normal.         Judgment: Judgment normal.              Diagnostic Results  EKG Interpretation    Rate:  77  BPM  Rhythm:  Normal Sinus Rhythm   Axis:   Right bundle branch block  Intervals: Normal, no blocks, QTc  482 ms  Q waves:  No pathologic Q waves   T waves: Inverted in inferior and lateral leads  ST segments: Depressions in V2 through V4  Impression:  Normal sinus rhythm with right bundle branch block and inferior and lateral ST-T changes but no STEMI    EKG for comparison: EKG dated 11 August 2021 similar in character with no major changes, ST-T abnormalities noted on today's study are also present similar in character  on that study.    EKG interpreted by me.       Labs:    Results Reviewed       Procedure Component Value Units Date/Time    HS Troponin I 2hr [[id number]]  (Normal) Collected: 02/11/24 1439    Lab Status: Final result Specimen: Blood from Arm, Left Updated: 02/11/24 1528     hs TnI 2hr 3 ng/L      Delta 2hr hsTnI 0 ng/L     HS Troponin I 4hr [[id number]]     Lab Status: No result Specimen: Blood     B-Type Natriuretic Peptide(BNP) [[id number]]  (Normal) Collected: 02/11/24 1231    Lab Status: Final result Specimen: Blood from Arm, Left Updated: 02/11/24 1307     BNP 43 pg/mL     HS Troponin 0hr (reflex protocol) [[id number]]  (Normal) Collected: 02/11/24 1231    Lab Status: Final result Specimen: Blood from Arm, Left Updated: 02/11/24 1307     hs TnI 0hr 3 ng/L     Lactic acid, plasma (w/reflex if result > 2.0) [[id number]]  (Normal) Collected: 02/11/24 1231    Lab Status: Final result Specimen: Blood from Arm, Left Updated: 02/11/24 1303     LACTIC ACID 0.9 mmol/L     Narrative:      Result may be elevated if tourniquet was used during collection.    Comprehensive metabolic panel [[id number]]  (Abnormal) Collected: 02/11/24 1231    Lab Status: Final result Specimen: Blood from Arm, Left Updated: 02/11/24 1303     Sodium 139 mmol/L      Potassium 4.4 mmol/L      Chloride 107 mmol/L      CO2 25 mmol/L      ANION GAP 7 mmol/L      BUN 16 mg/dL      Creatinine 0.90 mg/dL      Glucose 110 mg/dL      Calcium 9.6 mg/dL      AST 20 U/L      ALT <3 U/L      Alkaline Phosphatase 44 U/L      Total Protein 6.8 g/dL      Albumin 4.6 g/dL      Total Bilirubin 0.94 mg/dL      eGFR 77 ml/min/1.73sq m     Narrative:      National Kidney Disease Foundation guidelines for Chronic Kidney Disease (CKD):     Stage 1 with normal or high GFR (GFR > 90 mL/min/1.73 square meters)    Stage 2 Mild CKD (GFR = 60-89 mL/min/1.73 square meters)    Stage 3A Moderate CKD (GFR = 45-59 mL/min/1.73 square meters)    Stage 3B Moderate CKD (GFR = 30-44  mL/min/1.73 square meters)    Stage 4 Severe CKD (GFR = 15-29 mL/min/1.73 square meters)    Stage 5 End Stage CKD (GFR <15 mL/min/1.73 square meters)  Note: GFR calculation is accurate only with a steady state creatinine    Lipase [[id number]]  (Normal) Collected: 02/11/24 1231    Lab Status: Final result Specimen: Blood from Arm, Left Updated: 02/11/24 1303     Lipase 19 u/L     FLU/RSV/COVID - if FLU/RSV clinically relevant [[id number]]  (Normal) Collected: 02/11/24 1217    Lab Status: Final result Specimen: Nares from Nose Updated: 02/11/24 1300     SARS-CoV-2 Negative     INFLUENZA A PCR Negative     INFLUENZA B PCR Negative     RSV PCR Negative    Narrative:      FOR PEDIATRIC PATIENTS - copy/paste COVID Guidelines URL to browser: [email or web address]    SARS-CoV-2 assay is a Nucleic Acid Amplification assay intended for the  qualitative detection of nucleic acid from SARS-CoV-2 in nasopharyngeal  swabs. Results are for the presumptive identification of SARS-CoV-2 RNA.    Positive results are indicative of infection with SARS-CoV-2, the virus  causing COVID-19, but do not rule out bacterial infection or co-infection  with other viruses. Laboratories within the [place] and its  territories are required to report all positive results to the appropriate  public health authorities. Negative results do not preclude SARS-CoV-2  infection and should not be used as the sole basis for treatment or other  patient management decisions. Negative results must be combined with  clinical observations, patient history, and epidemiological information.  This test has not been FDA cleared or approved.    This test has been authorized by FDA under an Emergency Use Authorization  (EUA). This test is only authorized for the duration of time the  declaration that circumstances exist justifying the authorization of the  emergency use of an in vitro diagnostic tests for  detection of SARS-CoV-2  virus and/or diagnosis of COVID-19 infection under section 564(b)(1) of  the Act, 21 U.S.C. 360bbb-3(b)(1), unless the authorization is terminated  or revoked sooner. The test has been validated but independent review by FDA  and CLIA is pending.    Test performed using [employer] GeneXpert: This RT-PCR assay targets N2,  a region unique to SARS-CoV-2. A conserved region in the E-gene was chosen  for pan-Sarbecovirus detection which includes SARS-CoV-2.    According to CMS-2020-01-R, this platform meets the definition of high-throughput technology.    Protime-INR [[id number]]  (Normal) Collected: 02/11/24 1231    Lab Status: Final result Specimen: Blood from Arm, Left Updated: 02/11/24 1255     Protime 13.4 seconds      INR 0.96    APTT [[id number]]  (Normal) Collected: 02/11/24 1231    Lab Status: Final result Specimen: Blood from Arm, Left Updated: 02/11/24 1255     PTT 32 seconds     CBC and differential [[id number]]  (Abnormal) Collected: 02/11/24 1231    Lab Status: Final result Specimen: Blood from Arm, Left Updated: 02/11/24 1238     WBC 5.41 Thousand/uL      RBC 4.66 Million/uL      Hemoglobin 15.4 g/dL      Hematocrit 43.7 %      MCV 94 fL      MCH 33.0 pg      MCHC 35.2 g/dL      RDW 12.4 %      MPV 10.4 fL      Platelets 178 Thousands/uL      nRBC 0 /100 WBCs      Neutrophils Relative 81 %      Immat GRANS % 1 %      Lymphocytes Relative 12 %      Monocytes Relative 5 %      Eosinophils Relative 1 %      Basophils Relative 0 %      Neutrophils Absolute 4.44 Thousands/µL      Immature Grans Absolute 0.03 Thousand/uL      Lymphocytes Absolute 0.64 Thousands/µL      Monocytes Absolute 0.25 Thousand/µL      Eosinophils Absolute 0.03 Thousand/µL      Basophils Absolute 0.02 Thousands/µL             All labs reviewed and utilized in the medical decision making process    Radiology:    CT abdomen pelvis with contrast   Final Result         1. Sigmoid diverticulosis and moderately increased  stool in the rectosigmoid without evidence of large or small bowel obstruction or inflammatory change   2. Hepatic steatosis   3. Cholelithiasis         Workstation performed: [id number]         XR chest 1 view portable    (Results Pending)       All radiology studies independently viewed by me and interpreted by the radiologist.    Procedure    Procedures        ED Course of Care and Re-Assessments      Symptoms resolved with famotidine Protonix and Zofran as well as IV fluids.  Discussed results of CT scan with patient and family as well as results of lab work.  Overall reassuring workup, I offered the patient admission for observation with concern for potential cardiogenic cause of lightheadedness, however patient declined admission at this time in favor of close outpatient follow-up which seems reasonable.    Medications   HYDROmorphone HCl (DILAUDID) injection 0.2 mg (0 mg Intravenous Hold 2/11/24 1336)   Famotidine (PF) (PEPCID) injection 20 mg (20 mg Intravenous Given 2/11/24 1251)   pantoprazole (PROTONIX) injection 40 mg (40 mg Intravenous Given 2/11/24 1251)   ondansetron (ZOFRAN) injection 4 mg (4 mg Intravenous Given 2/11/24 1251)   iohexol (OMNIPAQUE) 350 MG/ML injection (MULTI-DOSE) 100 mL (100 mL Intravenous Given 2/11/24 1318)   lactated ringers bolus 1,000 mL (0 mL Intravenous Stopped 2/11/24 1641)           FINAL IMPRESSION    Final diagnoses:   Abdominal pain   Nausea and vomiting   Constipation   Diverticulosis   Lightheadedness   Dehydration         DISPOSITION/PLAN    Presentation as above with abdominal pain nausea and vomiting as well as lightheadedness.  Vital signs reassuring, examination likewise reassuring with overall benign abdominal exam.  Suspect lightheadedness secondary to vasovagal cause or possible postural hypotension potentiated by dehydration, with each episode correlated with episode of vomiting with the patient bent over followed by straightening up immediately.  Unable to  rule out cardiogenic syncope, patient and family understand potential malignant arrhythmia or other cardiac cause.  Relatively low suspicion for ACS based on reassuring EKG and negative troponins.  CT as above, based on resolution of symptoms with antiacids, duration of symptoms over several weeks, low suspicion for surgical disease, cholelithiasis noted but overall presentation not felt consistent with cholecystitis.  Suspect gastritis or ulcer as cause of symptoms, counseled regarding management of that condition.  As above, patient was offered admission but declined after discussion of risks and benefits, instead referred to gastroenterology for further workup, hemodynamically stable and comfortable at that time  Time reflects when diagnosis was documented in both MDM as applicable and the Disposition within this note       Time User Action Codes Description Comment    2/11/2024  4:18 PM Charles Barakat Add [R10.9] Abdominal pain     2/11/2024  4:18 PM Charles Barakat Add [R11.2] Nausea and vomiting     2/11/2024  4:18 PM Charles Barakat Add [K59.00] Constipation     2/11/2024  4:18 PM Charles Barakat Add [K57.90] Diverticulosis     2/11/2024  4:18 PM Charles Barakat Add [R42] Lightheadedness     2/11/2024  4:20 PM Charles Barakat Add [E86.0] Dehydration           ED Disposition       ED Disposition   Discharge    Condition   Stable    Date/Time   Sun Feb 11, 2024  4:18 PM    Comment   Momo Swann discharge to home/self care.                   Follow-up Information       Follow up With Specialties Details Why Contact Info Additional Information    [facility] Emergency Medicine Go to  If symptoms worsen [address] [facility] [id number] [phone] [facility], [address] [place], [place], [address]    Iain Martell,  Internal Medicine Call in 1 day To discuss this visit  and schedule close follow-up [address] [address]  [phone]       [facility] [place] [facility] Call today To discuss this visit and schedule close follow-up to help rule out gastritis or an ulcer as a cause of your symptoms [address] [facility] [address]  [facility] [id number] [phone] [facility] [place], [address] [place] [address] [place], [place], [id number] [phone]               PATIENT REFERRED TO:    [facility] Emergency [facility]  [address] [facility] [address] [phone]  Go to   If symptoms worsen    Iain Martell DO  [address] [id number] [phone]    Call in 1 day  To discuss this visit and schedule close follow-up    [facility] [place]  [address] [facility] [address]  [facility] [id number] [phone]  Call today  To discuss this visit and schedule close follow-up to help rule out gastritis or an ulcer as a cause of your symptoms      DISCHARGE MEDICATIONS:    Discharge Medication List as of 2/11/2024  4:20 PM        START taking these medications    Details   famotidine (PEPCID) 20 mg tablet Take 1 tablet (20 mg total) by mouth 2 (two) times a day, Starting Sun 2/11/2024, Print      ondansetron (ZOFRAN-ODT) 4 mg disintegrating tablet Take 1 tablet (4 mg total) by mouth every 6 (six) hours as needed for nausea or vomiting, Starting Sun 2/11/2024, Print      polyethylene glycol (GLYCOLAX) 17 GM/SCOOP powder Take 17 g by mouth as needed (constipation) Take two doses of 17 g mixed with eight ounces of water each initially, then one dose per hour until you have a bowel movement., Starting Sun 2/11/2024, Print      sucralfate (CARAFATE) 1 g tablet Take 1 tablet (1 g total) by mouth 3 (three) times a day, Starting Sun  "2/11/2024, Print           CONTINUE these medications which have NOT CHANGED    Details   docusate sodium (COLACE) 100 mg capsule Take 1 capsule (100 mg total) by mouth 2 (two) times a day, Starting Sat 8/14/2021, Normal      famotidine (PEPCID) 20 mg/2.5 mL oral suspension Take 5 mL (40 mg total) by mouth daily at bedtime, Starting Mon 12/4/2023, Until Wed 1/3/2024, Normal      losartan (COZAAR) 25 mg tablet Take 25 mg by mouth daily, Historical Med      meloxicam (MOBIC) 15 mg tablet Historical Med      Multiple Vitamins-Minerals (CENTRUM SILVER PO) Take by mouth daily, Historical Med      Rytary 48. MG CPCR Historical Med      tadalafil (CIALIS) 20 MG tablet Take 1 tablet (20 mg total) by mouth as needed for erectile dysfunction, Starting Fri 11/19/2021, Normal                      Charles Barakat MD    Portions of the record may have been created with voice recognition software.  Occasional wrong word or \"sound alike\" substitutions may have occurred due to the inherent limitations of voice recognition software.  Please read the chart carefully and recognize, using context, where substitutions have occurred     Charles Barakat MD  02/11/24 1800    "

## 2024-02-11 NOTE — ED NOTES
Pt was PO challenged. Pt was able to maintain fluids. Grecia N/V.     Barbi Parks, RN  02/11/24 9363

## 2024-02-11 NOTE — PROGRESS NOTES
[facility]        NAME: Momo Swann is a 88 y.o. male  : 1935    MRN: [id number]  DATE: 2024  TIME: 11:21 AM    Assessment and Plan   Nausea and vomiting, unspecified vomiting type [R11.2]  1. Nausea and vomiting, unspecified vomiting type  Ambulatory Referral to Gastroenterology    Transfer to other facility    DISCONTINUED: ondansetron (ZOFRAN) 4 mg tablet      2. Dizzy  Transfer to other facility      3. At risk for dehydration  Transfer to other facility      4. History of gastroesophageal reflux (GERD)  Ambulatory Referral to Gastroenterology    Transfer to other facility    DISCONTINUED: aluminum-magnesium hydroxide-simethicone (MAALOX) 200-200-20 MG/5ML SUSP        While discharging patient, abdominal pain became worse, pt unable to sit on examination table due to increasing pain, stood up, became acutely dizzy. POCT fingerstick glucose performed: 107, pt felt like he was going to vomit, burped, and was nauseated. Zofran provided. Plan of care changed due to current symptoms, pt advised to proceed to the ER. Pt and pt's wife refused ambulance, stated they would like to proceed to [facility] ER.     Referral provided to GI for further evaluation and management as requested will keep referral in pt's chart as discussed with pt's wife.   Patient Instructions     Please proceed to the ER for further evaluation and treatment.     Chief Complaint     Chief Complaint   Patient presents with    Vomiting     Pt reports with vomiting x3 episodes yesterday and today, and abdominal pain over the umbilicus with nausea x1 month. Pt reports his acid reflux is getting progressively worse.      History of Present Illness   Pt is an 89 y/o M who presents to the clinic with a CC of vomiting.     Pt reports his acid reflux is progressively getting worse with associated nausea for over a month. Pt takes daily Pepcid stating this has not help his symptoms.     Vomiting   This is a new  problem. The current episode started yesterday. Episode frequency: 3 episodes yesterday, 1 episode this morning. The problem has been gradually worsening. The emesis has an appearance of stomach contents. There has been no fever. Associated symptoms include abdominal pain. Pertinent negatives include no chest pain, chills, coughing, diarrhea, dizziness, fever, headaches, myalgias or URI. He has tried nothing for the symptoms.       Review of Systems   Review of Systems   Constitutional:  Positive for activity change and appetite change. Negative for chills, diaphoresis, fatigue and fever.   Respiratory: Negative.  Negative for cough, shortness of breath and wheezing.    Cardiovascular:  Negative for chest pain.   Gastrointestinal:  Positive for abdominal pain, nausea and vomiting. Negative for blood in stool, constipation and diarrhea.   Musculoskeletal:  Negative for myalgias.   Skin:  Negative for color change and wound.   Neurological:  Negative for dizziness, light-headedness and headaches.     Current Medications       Current Outpatient Medications:     docusate sodium (COLACE) 100 mg capsule, Take 1 capsule (100 mg total) by mouth 2 (two) times a day, Disp: 10 capsule, Rfl: 0    losartan (COZAAR) 25 mg tablet, Take 25 mg by mouth daily, Disp: , Rfl:     meloxicam (MOBIC) 15 mg tablet, , Disp: , Rfl:     Multiple Vitamins-Minerals (CENTRUM SILVER PO), Take by mouth daily, Disp: , Rfl:     Rytary 48. MG CPCR, , Disp: , Rfl:     tadalafil (CIALIS) 20 MG tablet, Take 1 tablet (20 mg total) by mouth as needed for erectile dysfunction, Disp: 30 tablet, Rfl: 0    famotidine (PEPCID) 20 mg/2.5 mL oral suspension, Take 5 mL (40 mg total) by mouth daily at bedtime, Disp: 150 mL, Rfl: 3    Current Allergies     Allergies as of 02/11/2024 - Reviewed 02/11/2024   Allergen Reaction Noted    Carbidopa w-levodopa GI Intolerance 07/13/2021    Oxycodone Vomiting 07/13/2021    Vicodin [hydrocodone-acetaminophen] Vomiting  07/13/2021            The following portions of the patient's history were reviewed and updated as appropriate: allergies, current medications, past family history, past medical history, past social history, past surgical history and problem list.     Past Medical History:   Diagnosis Date    Arthritis     Bladder stones     Hypertension     Parkinson disease        Past Surgical History:   Procedure Laterality Date    COLONOSCOPY      FOOT SURGERY      NM CYSTOLITHOTOMY CYSTOTOMY W/RMVL CALCULUS N/A 8/5/2021    Procedure: CYSTOLITHOTOMY OPEN;  Surgeon: Adiel Niño MD;  Location: [facility];  Service: Urology    NM TRURL ELECTROSURG RESCJ PROSTATE BLEED COMPLETE N/A 8/5/2021    Procedure: TRANSURETHRAL RESECTION OF PROSTATE (TURP);  Surgeon: Adiel Niño MD;  Location: [facility];  Service: Urology    TONSILLECTOMY         Family History   Problem Relation Age of Onset    No Known Problems Mother     Lung cancer Father     No Known Problems Sister     No Known Problems Sister     Lung cancer Sister          Medications have been verified.        Objective   /85   Pulse 77   Temp 97.6 °F (36.4 °C) (Temporal)   Resp 18   Ht 6' (1.829 m)   Wt 83.9 kg (185 lb)   SpO2 96%   BMI 25.09 kg/m²        Physical Exam     Physical Exam  Vitals and nursing note reviewed. Exam conducted with a chaperone present (Wife).   Constitutional:       General: He is not in acute distress.     Appearance: Normal appearance. He is obese. He is not ill-appearing, toxic-appearing or diaphoretic.   HENT:      Head: Normocephalic.   Cardiovascular:      Rate and Rhythm: Normal rate and regular rhythm.      Pulses: Normal pulses.      Heart sounds: Normal heart sounds. No murmur heard.  Pulmonary:      Effort: Pulmonary effort is normal. No respiratory distress.      Breath sounds: Normal breath sounds. No stridor. No wheezing, rhonchi or rales.   Chest:      Chest wall: No tenderness.   Abdominal:      General: Bowel sounds  are decreased.      Palpations: Abdomen is soft.      Tenderness: There is generalized abdominal tenderness and tenderness in the periumbilical area. There is guarding. There is no right CVA tenderness or left CVA tenderness.   Musculoskeletal:         General: Normal range of motion.   Skin:     General: Skin is warm.   Neurological:      Mental Status: He is alert.

## 2024-02-13 LAB
ATRIAL RATE: 77 BPM
P AXIS: 39 DEGREES
PR INTERVAL: 180 MS
QRS AXIS: 91 DEGREES
QRSD INTERVAL: 142 MS
QT INTERVAL: 426 MS
QTC INTERVAL: 482 MS
T WAVE AXIS: -34 DEGREES
VENTRICULAR RATE: 77 BPM

## 2024-02-15 ENCOUNTER — OFFICE VISIT (OUTPATIENT)
Age: 89
End: 2024-02-15
Payer: COMMERCIAL

## 2024-02-15 VITALS
HEIGHT: 72 IN | HEART RATE: 73 BPM | SYSTOLIC BLOOD PRESSURE: 124 MMHG | BODY MASS INDEX: 25.06 KG/M2 | DIASTOLIC BLOOD PRESSURE: 70 MMHG | OXYGEN SATURATION: 94 % | WEIGHT: 185 LBS

## 2024-02-15 DIAGNOSIS — R63.4 WEIGHT LOSS: ICD-10-CM

## 2024-02-15 DIAGNOSIS — Z87.19 HISTORY OF GASTROESOPHAGEAL REFLUX (GERD): ICD-10-CM

## 2024-02-15 DIAGNOSIS — R11.2 NAUSEA AND VOMITING, UNSPECIFIED VOMITING TYPE: ICD-10-CM

## 2024-02-15 DIAGNOSIS — R10.13 EPIGASTRIC PAIN: Primary | ICD-10-CM

## 2024-02-15 PROCEDURE — 99204 OFFICE O/P NEW MOD 45 MIN: CPT | Performed by: PHYSICIAN ASSISTANT

## 2024-02-15 RX ORDER — PANTOPRAZOLE SODIUM 40 MG/1
40 TABLET, DELAYED RELEASE ORAL DAILY
Qty: 30 TABLET | Refills: 1 | Status: SHIPPED | OUTPATIENT
Start: 2024-02-15 | End: 2024-04-15

## 2024-02-15 NOTE — H&P (VIEW-ONLY)
[facility] - Outpatient Consultation  Momo Swann 88 y.o. male MRN: [id number]  Encounter: [id number]          ASSESSMENT AND PLAN:      1. History of gastroesophageal reflux (GERD)  2. Nausea and vomiting  3. Epigastric pain  4. Weight loss    Patient reports of reflux, nausea, vomiting, and epigastric discomfort x 6 weeks.  He also reports weight loss.  He went to the ER and had a CT Scan A/P with contrast 2/11 which showed moderate stool in the rectosigmoid colon, hepatic steatosis, and cholelithiasis. CBC and CMP were normal.  He was recently started on Famotidine at bedtime with partial improvement.  He has temporary improvement with Tums as well.  He has been taking Meloxicam daily/NSAIDs for some time (risk for PUD, gastritis).    Will plan for EGD to investigate: evaluate for gastritis, PUD, h pylori, ensure no malignancy (given his weight loss), etc.  Suspicion for PUD/gastritis given his NSAID use and partial improvement with Tums, Famotidine.   Will start Pantoprazole 40mg po daily x 8 weeks.  Continue the Famotidine course at bedtime.  Stop NSAIDs/meloxicam.  If EGD negative and he does not improve with the PPI, consideration for symptoms from the gallstones as well as consider a gastric emptying study to evaluate for gastroparesis.    ______________________________________________________________________    HPI:  Patient is a pleasant 88 year old male with a PMH of Parkinson disease, arthritis, HTN who presents to the office for a GI evaluation.  Patient reports of reflux, nausea, vomiting, and epigastric discomfort x 6 weeks.  He also reports weight loss. He went to the ER and had a CT Scan A/P with contrast 2/11 which showed moderate stool in the rectosigmoid colon, hepatic steatosis, and cholelithiasis. CBC and CMP were normal. He was recently started on Famotidine at bedtime with partial improvement.  He has temporary improvement with Tums as well.  He has been taking  Meloxicam daily/NSAIDs for some time (risk for PUD, gastritis).  No melena or blood in the stool.  He is taking Miralax for his constipation with benefit.      REVIEW OF SYSTEMS:    CONSTITUTIONAL: Denies any fever, chills, rigors, and weight loss.  HEENT: No earache or tinnitus. Denies hearing loss or visual disturbances.  CARDIOVASCULAR: No chest pain or palpitations.   RESPIRATORY: Denies any cough, hemoptysis, shortness of breath or dyspnea on exertion.  GASTROINTESTINAL: As noted in the History of Present Illness.   GENITOURINARY: No problems with urination. Denies any hematuria or dysuria.  NEUROLOGIC: No dizziness or vertigo, denies headaches.   MUSCULOSKELETAL: Denies any muscle or joint pain.   SKIN: Denies skin rashes or itching.   ENDOCRINE: Denies excessive thirst. Denies intolerance to heat or cold.  PSYCHOSOCIAL: Denies depression or anxiety. Denies any recent memory loss.       Historical Information   Past Medical History:   Diagnosis Date    Arthritis     Bladder stones     Hypertension     Parkinson disease      Past Surgical History:   Procedure Laterality Date    COLONOSCOPY      FOOT SURGERY      OR CYSTOLITHOTOMY CYSTOTOMY W/RMVL CALCULUS N/A 8/5/2021    Procedure: CYSTOLITHOTOMY OPEN;  Surgeon: Adiel Niño MD;  Location: [facility];  Service: Urology    OR TRURL ELECTROSURG RESCJ PROSTATE BLEED COMPLETE N/A 8/5/2021    Procedure: TRANSURETHRAL RESECTION OF PROSTATE (TURP);  Surgeon: Adiel Niño MD;  Location: [facility];  Service: Urology    TONSILLECTOMY       Social History   Social History     Substance and Sexual Activity   Alcohol Use Never     Social History     Substance and Sexual Activity   Drug Use Never     Social History     Tobacco Use   Smoking Status Never   Smokeless Tobacco Never     Family History   Problem Relation Age of Onset    No Known Problems Mother     Lung cancer Father     No Known Problems Sister     No Known Problems Sister     Lung cancer Sister         Meds/Allergies       Current Outpatient Medications:     docusate sodium (COLACE) 100 mg capsule    losartan (COZAAR) 25 mg tablet    meloxicam (MOBIC) 15 mg tablet    Multiple Vitamins-Minerals (CENTRUM SILVER PO)    ondansetron (ZOFRAN-ODT) 4 mg disintegrating tablet    pantoprazole (PROTONIX) 40 mg tablet    polyethylene glycol (GLYCOLAX) 17 GM/SCOOP powder    Rytary 48. MG CPCR    sucralfate (CARAFATE) 1 g tablet    famotidine (PEPCID) 20 mg/2.5 mL oral suspension    No Known Allergies        Objective     Blood pressure 124/70, pulse 73, height 6' (1.829 m), weight 83.9 kg (185 lb), SpO2 94%. Body mass index is 25.09 kg/m².        PHYSICAL EXAM:      General Appearance:   Alert, cooperative, no distress   HEENT:   Normocephalic, atraumatic, anicteric   Neck:  Supple, symmetrical, trachea midline   Lungs:   Clear to auscultation bilaterally; no rales, rhonchi or wheezing; respirations unlabored    Heart::   Regular rate and rhythm; no murmur, rub, or gallop.   Abdomen:   Soft, non-tender, non-distended; normal bowel sounds; no masses, no organomegaly    Genitalia:   Deferred    Rectal:   Deferred    Extremities:  No cyanosis, clubbing or edema    Pulses:  2+ and symmetric    Skin:  No jaundice, rashes, or lesions    Lymph nodes:  No palpable cervical lymphadenopathy        Lab Results:   No visits with results within 1 Day(s) from this visit.   Latest known visit with results is:   Admission on 02/11/2024, Discharged on 02/11/2024   Component Date Value    WBC 02/11/2024 5.41     RBC 02/11/2024 4.66     Hemoglobin 02/11/2024 15.4     Hematocrit 02/11/2024 43.7     MCV 02/11/2024 94     MCH 02/11/2024 33.0     MCHC 02/11/2024 35.2     RDW 02/11/2024 12.4     MPV 02/11/2024 10.4     Platelets 02/11/2024 178     nRBC 02/11/2024 0     Neutrophils Relative 02/11/2024 81 (H)     Immat GRANS % 02/11/2024 1     Lymphocytes Relative 02/11/2024 12 (L)     Monocytes Relative 02/11/2024 5     Eosinophils  Relative 02/11/2024 1     Basophils Relative 02/11/2024 0     Neutrophils Absolute 02/11/2024 4.44     Immature Grans Absolute 02/11/2024 0.03     Lymphocytes Absolute 02/11/2024 0.64     Monocytes Absolute 02/11/2024 0.25     Eosinophils Absolute 02/11/2024 0.03     Basophils Absolute 02/11/2024 0.02     Protime 02/11/2024 13.4     INR 02/11/2024 0.96     PTT 02/11/2024 32     Sodium 02/11/2024 139     Potassium 02/11/2024 4.4     Chloride 02/11/2024 107     CO2 02/11/2024 25     ANION GAP 02/11/2024 7     BUN 02/11/2024 16     Creatinine 02/11/2024 0.90     Glucose 02/11/2024 110     Calcium 02/11/2024 9.6     AST 02/11/2024 20     ALT 02/11/2024 <3 (L)     Alkaline Phosphatase 02/11/2024 44     Total Protein 02/11/2024 6.8     Albumin 02/11/2024 4.6     Total Bilirubin 02/11/2024 0.94     eGFR 02/11/2024 77     hs TnI 0hr 02/11/2024 3     BNP 02/11/2024 43     Lipase 02/11/2024 19     SARS-CoV-2 02/11/2024 Negative     INFLUENZA A PCR 02/11/2024 Negative     INFLUENZA B PCR 02/11/2024 Negative     RSV PCR 02/11/2024 Negative     LACTIC ACID 02/11/2024 0.9     hs TnI 2hr 02/11/2024 3     Delta 2hr hsTnI 02/11/2024 0     Ventricular Rate 02/11/2024 77     Atrial Rate 02/11/2024 77     [place] Interval 02/11/2024 180     QRSD Interval 02/11/2024 142     QT Interval 02/11/2024 426     QTC Interval 02/11/2024 482     P Axis 02/11/2024 39     QRS [place] 02/11/2024 91     T Wave Axis 02/11/2024 -34          Radiology Results:   XR chest 1 view portable    Result Date: 2/12/2024  Narrative: XR CHEST PORTABLE INDICATION: vomiting. COMPARISON: None FINDINGS: Clear lungs. No pneumothorax or pleural effusion. Normal cardiomediastinal silhouette. Bones are unremarkable for age. Normal upper abdomen.     Impression: No acute cardiopulmonary disease. Workstation performed: [id number]     CT abdomen pelvis with contrast    Result Date: 2/11/2024  Narrative: CT ABDOMEN AND PELVIS WITH IV CONTRAST INDICATION: Abdominal pain, acute,  nonlocalized abdominal pain, persistent vomiting. Lightheadedness COMPARISON: 8/12/2021 TECHNIQUE: CT examination of the abdomen and pelvis was performed. Multiplanar 2D reformatted images were created from the source data. This examination, like all CT scans performed in the [facility], was performed utilizing techniques to minimize radiation dose exposure, including the use of iterative reconstruction and automated exposure control. Radiation dose length product (DLP) for this visit: 663 mGy-cm IV Contrast: 100 mL of iohexol (OMNIPAQUE) Enteric Contrast: Not administered. FINDINGS: ABDOMEN LOWER CHEST: Coronary artery calcification LIVER/BILIARY TREE: Hepatic steatosis without focal abnormality seen GALLBLADDER: Cholelithiasis without findings of acute cholecystitis. SPLEEN: Unremarkable. PANCREAS: Scattered calcifications in the tail of the pancreas, which may be associated with remote or chronic pancreatitis. No evidence of acute pancreatitis ADRENAL GLANDS: Unremarkable. KIDNEYS/URETERS: Several subcentimeter hypodense nodules right kidney lower pole too small to characterize. Otherwise unremarkable. STOMACH AND BOWEL: Colonic diverticulosis and moderately increased stool present within the rectosigmoid without small bowel obstruction or inflammatory change. APPENDIX: No findings to suggest appendicitis. ABDOMINOPELVIC CAVITY: No ascites. No pneumoperitoneum. No lymphadenopathy. VESSELS: Unremarkable for patient's age. PELVIS REPRODUCTIVE ORGANS: Unremarkable for patient's age. URINARY BLADDER: Unremarkable. ABDOMINAL WALL/INGUINAL REGIONS: Unremarkable. BONES: Degenerative changes of the lumbar spine     Impression: 1. Sigmoid diverticulosis and moderately increased stool in the rectosigmoid without evidence of large or small bowel obstruction or inflammatory change 2. Hepatic steatosis 3. Cholelithiasis Workstation performed: [id number]

## 2024-02-15 NOTE — PROGRESS NOTES
[facility] - Outpatient Consultation  Momo Swann 88 y.o. male MRN: [id number]  Encounter: [id number]          ASSESSMENT AND PLAN:      1. History of gastroesophageal reflux (GERD)  2. Nausea and vomiting  3. Epigastric pain  4. Weight loss    Patient reports of reflux, nausea, vomiting, and epigastric discomfort x 6 weeks.  He also reports weight loss.  He went to the ER and had a CT Scan A/P with contrast 2/11 which showed moderate stool in the rectosigmoid colon, hepatic steatosis, and cholelithiasis. CBC and CMP were normal.  He was recently started on Famotidine at bedtime with partial improvement.  He has temporary improvement with Tums as well.  He has been taking Meloxicam daily/NSAIDs for some time (risk for PUD, gastritis).    Will plan for EGD to investigate: evaluate for gastritis, PUD, h pylori, ensure no malignancy (given his weight loss), etc.  Suspicion for PUD/gastritis given his NSAID use and partial improvement with Tums, Famotidine.   Will start Pantoprazole 40mg po daily x 8 weeks.  Continue the Famotidine course at bedtime.  Stop NSAIDs/meloxicam.  If EGD negative and he does not improve with the PPI, consideration for symptoms from the gallstones as well as consider a gastric emptying study to evaluate for gastroparesis.    ______________________________________________________________________    HPI:  Patient is a pleasant 88 year old male with a PMH of Parkinson disease, arthritis, HTN who presents to the office for a GI evaluation.  Patient reports of reflux, nausea, vomiting, and epigastric discomfort x 6 weeks.  He also reports weight loss. He went to the ER and had a CT Scan A/P with contrast 2/11 which showed moderate stool in the rectosigmoid colon, hepatic steatosis, and cholelithiasis. CBC and CMP were normal. He was recently started on Famotidine at bedtime with partial improvement.  He has temporary improvement with Tums as well.  He has been taking  Meloxicam daily/NSAIDs for some time (risk for PUD, gastritis).  No melena or blood in the stool.  He is taking Miralax for his constipation with benefit.      REVIEW OF SYSTEMS:    CONSTITUTIONAL: Denies any fever, chills, rigors, and weight loss.  HEENT: No earache or tinnitus. Denies hearing loss or visual disturbances.  CARDIOVASCULAR: No chest pain or palpitations.   RESPIRATORY: Denies any cough, hemoptysis, shortness of breath or dyspnea on exertion.  GASTROINTESTINAL: As noted in the History of Present Illness.   GENITOURINARY: No problems with urination. Denies any hematuria or dysuria.  NEUROLOGIC: No dizziness or vertigo, denies headaches.   MUSCULOSKELETAL: Denies any muscle or joint pain.   SKIN: Denies skin rashes or itching.   ENDOCRINE: Denies excessive thirst. Denies intolerance to heat or cold.  PSYCHOSOCIAL: Denies depression or anxiety. Denies any recent memory loss.       Historical Information   Past Medical History:   Diagnosis Date    Arthritis     Bladder stones     Hypertension     Parkinson disease      Past Surgical History:   Procedure Laterality Date    COLONOSCOPY      FOOT SURGERY      SC CYSTOLITHOTOMY CYSTOTOMY W/RMVL CALCULUS N/A 8/5/2021    Procedure: CYSTOLITHOTOMY OPEN;  Surgeon: Adiel Niño MD;  Location: [facility];  Service: Urology    SC TRURL ELECTROSURG RESCJ PROSTATE BLEED COMPLETE N/A 8/5/2021    Procedure: TRANSURETHRAL RESECTION OF PROSTATE (TURP);  Surgeon: Adiel Niño MD;  Location: [facility];  Service: Urology    TONSILLECTOMY       Social History   Social History     Substance and Sexual Activity   Alcohol Use Never     Social History     Substance and Sexual Activity   Drug Use Never     Social History     Tobacco Use   Smoking Status Never   Smokeless Tobacco Never     Family History   Problem Relation Age of Onset    No Known Problems Mother     Lung cancer Father     No Known Problems Sister     No Known Problems Sister     Lung cancer Sister         Meds/Allergies       Current Outpatient Medications:     docusate sodium (COLACE) 100 mg capsule    losartan (COZAAR) 25 mg tablet    meloxicam (MOBIC) 15 mg tablet    Multiple Vitamins-Minerals (CENTRUM SILVER PO)    ondansetron (ZOFRAN-ODT) 4 mg disintegrating tablet    pantoprazole (PROTONIX) 40 mg tablet    polyethylene glycol (GLYCOLAX) 17 GM/SCOOP powder    Rytary 48. MG CPCR    sucralfate (CARAFATE) 1 g tablet    famotidine (PEPCID) 20 mg/2.5 mL oral suspension    No Known Allergies        Objective     Blood pressure 124/70, pulse 73, height 6' (1.829 m), weight 83.9 kg (185 lb), SpO2 94%. Body mass index is 25.09 kg/m².        PHYSICAL EXAM:      General Appearance:   Alert, cooperative, no distress   HEENT:   Normocephalic, atraumatic, anicteric   Neck:  Supple, symmetrical, trachea midline   Lungs:   Clear to auscultation bilaterally; no rales, rhonchi or wheezing; respirations unlabored    Heart::   Regular rate and rhythm; no murmur, rub, or gallop.   Abdomen:   Soft, non-tender, non-distended; normal bowel sounds; no masses, no organomegaly    Genitalia:   Deferred    Rectal:   Deferred    Extremities:  No cyanosis, clubbing or edema    Pulses:  2+ and symmetric    Skin:  No jaundice, rashes, or lesions    Lymph nodes:  No palpable cervical lymphadenopathy        Lab Results:   No visits with results within 1 Day(s) from this visit.   Latest known visit with results is:   Admission on 02/11/2024, Discharged on 02/11/2024   Component Date Value    WBC 02/11/2024 5.41     RBC 02/11/2024 4.66     Hemoglobin 02/11/2024 15.4     Hematocrit 02/11/2024 43.7     MCV 02/11/2024 94     MCH 02/11/2024 33.0     MCHC 02/11/2024 35.2     RDW 02/11/2024 12.4     MPV 02/11/2024 10.4     Platelets 02/11/2024 178     nRBC 02/11/2024 0     Neutrophils Relative 02/11/2024 81 (H)     Immat GRANS % 02/11/2024 1     Lymphocytes Relative 02/11/2024 12 (L)     Monocytes Relative 02/11/2024 5     Eosinophils  Relative 02/11/2024 1     Basophils Relative 02/11/2024 0     Neutrophils Absolute 02/11/2024 4.44     Immature Grans Absolute 02/11/2024 0.03     Lymphocytes Absolute 02/11/2024 0.64     Monocytes Absolute 02/11/2024 0.25     Eosinophils Absolute 02/11/2024 0.03     Basophils Absolute 02/11/2024 0.02     Protime 02/11/2024 13.4     INR 02/11/2024 0.96     PTT 02/11/2024 32     Sodium 02/11/2024 139     Potassium 02/11/2024 4.4     Chloride 02/11/2024 107     CO2 02/11/2024 25     ANION GAP 02/11/2024 7     BUN 02/11/2024 16     Creatinine 02/11/2024 0.90     Glucose 02/11/2024 110     Calcium 02/11/2024 9.6     AST 02/11/2024 20     ALT 02/11/2024 <3 (L)     Alkaline Phosphatase 02/11/2024 44     Total Protein 02/11/2024 6.8     Albumin 02/11/2024 4.6     Total Bilirubin 02/11/2024 0.94     eGFR 02/11/2024 77     hs TnI 0hr 02/11/2024 3     BNP 02/11/2024 43     Lipase 02/11/2024 19     SARS-CoV-2 02/11/2024 Negative     INFLUENZA A PCR 02/11/2024 Negative     INFLUENZA B PCR 02/11/2024 Negative     RSV PCR 02/11/2024 Negative     LACTIC ACID 02/11/2024 0.9     hs TnI 2hr 02/11/2024 3     Delta 2hr hsTnI 02/11/2024 0     Ventricular Rate 02/11/2024 77     Atrial Rate 02/11/2024 77     MI Interval 02/11/2024 180     QRSD Interval 02/11/2024 142     QT Interval 02/11/2024 426     QTC Interval 02/11/2024 482     P Axis 02/11/2024 39     QRS [place] 02/11/2024 91     T Wave Axis 02/11/2024 -34          Radiology Results:   XR chest 1 view portable    Result Date: 2/12/2024  Narrative: XR CHEST PORTABLE INDICATION: vomiting. COMPARISON: None FINDINGS: Clear lungs. No pneumothorax or pleural effusion. Normal cardiomediastinal silhouette. Bones are unremarkable for age. Normal upper abdomen.     Impression: No acute cardiopulmonary disease. Workstation performed: [id number]     CT abdomen pelvis with contrast    Result Date: 2/11/2024  Narrative: CT ABDOMEN AND PELVIS WITH IV CONTRAST INDICATION: Abdominal pain, acute,  nonlocalized abdominal pain, persistent vomiting. Lightheadedness COMPARISON: 8/12/2021 TECHNIQUE: CT examination of the abdomen and pelvis was performed. Multiplanar 2D reformatted images were created from the source data. This examination, like all CT scans performed in the [facility], was performed utilizing techniques to minimize radiation dose exposure, including the use of iterative reconstruction and automated exposure control. Radiation dose length product (DLP) for this visit: 663 mGy-cm IV Contrast: 100 mL of iohexol (OMNIPAQUE) Enteric Contrast: Not administered. FINDINGS: ABDOMEN LOWER CHEST: Coronary artery calcification LIVER/BILIARY TREE: Hepatic steatosis without focal abnormality seen GALLBLADDER: Cholelithiasis without findings of acute cholecystitis. SPLEEN: Unremarkable. PANCREAS: Scattered calcifications in the tail of the pancreas, which may be associated with remote or chronic pancreatitis. No evidence of acute pancreatitis ADRENAL GLANDS: Unremarkable. KIDNEYS/URETERS: Several subcentimeter hypodense nodules right kidney lower pole too small to characterize. Otherwise unremarkable. STOMACH AND BOWEL: Colonic diverticulosis and moderately increased stool present within the rectosigmoid without small bowel obstruction or inflammatory change. APPENDIX: No findings to suggest appendicitis. ABDOMINOPELVIC CAVITY: No ascites. No pneumoperitoneum. No lymphadenopathy. VESSELS: Unremarkable for patient's age. PELVIS REPRODUCTIVE ORGANS: Unremarkable for patient's age. URINARY BLADDER: Unremarkable. ABDOMINAL WALL/INGUINAL REGIONS: Unremarkable. BONES: Degenerative changes of the lumbar spine     Impression: 1. Sigmoid diverticulosis and moderately increased stool in the rectosigmoid without evidence of large or small bowel obstruction or inflammatory change 2. Hepatic steatosis 3. Cholelithiasis Workstation performed: [id number]

## 2024-02-15 NOTE — PATIENT INSTRUCTIONS
Scheduled date of EGD(as of today): 3/5/24  Physician performing EGD: Jose Manuel  Location of EGD: [place]  Instructions reviewed with patient by: Patrizia DISLA  Clearances:

## 2024-03-05 ENCOUNTER — ANESTHESIA (OUTPATIENT)
Dept: GASTROENTEROLOGY | Facility: HOSPITAL | Age: 89
End: 2024-03-05

## 2024-03-05 ENCOUNTER — HOSPITAL ENCOUNTER (OUTPATIENT)
Dept: GASTROENTEROLOGY | Facility: HOSPITAL | Age: 89
Setting detail: OUTPATIENT SURGERY
Discharge: HOME | End: 2024-03-05
Payer: COMMERCIAL

## 2024-03-05 ENCOUNTER — ANESTHESIA EVENT (OUTPATIENT)
Dept: GASTROENTEROLOGY | Facility: HOSPITAL | Age: 89
End: 2024-03-05

## 2024-03-05 VITALS
RESPIRATION RATE: 18 BRPM | BODY MASS INDEX: 25.08 KG/M2 | TEMPERATURE: 97.6 F | OXYGEN SATURATION: 97 % | HEART RATE: 70 BPM | DIASTOLIC BLOOD PRESSURE: 74 MMHG | HEIGHT: 72 IN | SYSTOLIC BLOOD PRESSURE: 126 MMHG | WEIGHT: 185.19 LBS

## 2024-03-05 DIAGNOSIS — Z87.19 HISTORY OF GASTROESOPHAGEAL REFLUX (GERD): ICD-10-CM

## 2024-03-05 DIAGNOSIS — R10.13 EPIGASTRIC PAIN: ICD-10-CM

## 2024-03-05 DIAGNOSIS — R63.4 WEIGHT LOSS: ICD-10-CM

## 2024-03-05 DIAGNOSIS — R11.2 NAUSEA AND VOMITING, UNSPECIFIED VOMITING TYPE: ICD-10-CM

## 2024-03-05 PROCEDURE — 43239 EGD BIOPSY SINGLE/MULTIPLE: CPT | Performed by: INTERNAL MEDICINE

## 2024-03-05 PROCEDURE — 88305 TISSUE EXAM BY PATHOLOGIST: CPT | Performed by: PATHOLOGY

## 2024-03-05 RX ORDER — PROPOFOL 10 MG/ML
INJECTION, EMULSION INTRAVENOUS AS NEEDED
Status: DISCONTINUED | OUTPATIENT
Start: 2024-03-05 | End: 2024-03-05

## 2024-03-05 RX ORDER — LIDOCAINE HYDROCHLORIDE 20 MG/ML
INJECTION, SOLUTION EPIDURAL; INFILTRATION; INTRACAUDAL; PERINEURAL AS NEEDED
Status: DISCONTINUED | OUTPATIENT
Start: 2024-03-05 | End: 2024-03-05

## 2024-03-05 RX ORDER — SODIUM CHLORIDE, SODIUM LACTATE, POTASSIUM CHLORIDE, CALCIUM CHLORIDE 600; 310; 30; 20 MG/100ML; MG/100ML; MG/100ML; MG/100ML
INJECTION, SOLUTION INTRAVENOUS CONTINUOUS PRN
Status: DISCONTINUED | OUTPATIENT
Start: 2024-03-05 | End: 2024-03-05

## 2024-03-05 RX ADMIN — PROPOFOL 60 MG: 10 INJECTION, EMULSION INTRAVENOUS at 08:45

## 2024-03-05 RX ADMIN — LIDOCAINE HYDROCHLORIDE 60 MG: 20 INJECTION, SOLUTION EPIDURAL; INFILTRATION; INTRACAUDAL; PERINEURAL at 08:43

## 2024-03-05 RX ADMIN — PROPOFOL 20 MG: 10 INJECTION, EMULSION INTRAVENOUS at 08:47

## 2024-03-05 RX ADMIN — LIDOCAINE HYDROCHLORIDE 40 MG: 20 INJECTION, SOLUTION EPIDURAL; INFILTRATION; INTRACAUDAL; PERINEURAL at 08:45

## 2024-03-05 RX ADMIN — SODIUM CHLORIDE, SODIUM LACTATE, POTASSIUM CHLORIDE, AND CALCIUM CHLORIDE: .6; .31; .03; .02 INJECTION, SOLUTION INTRAVENOUS at 08:35

## 2024-03-05 RX ADMIN — PROPOFOL 20 MG: 10 INJECTION, EMULSION INTRAVENOUS at 08:49

## 2024-03-05 NOTE — ANESTHESIA PREPROCEDURE EVALUATION
Procedure:  EGD    Relevant Problems   CARDIO   (+) Essential hypertension   (+) Mixed hyperlipidemia      GI/HEPATIC   (+) Dysphagia      /RENAL   (+) BPH (benign prostatic hyperplasia)   (+) Enlarged prostate with lower urinary tract symptoms (LUTS)   (+) Kidney stones      MUSCULOSKELETAL   (+) Atrophy of muscle of both lower legs   (+) Osteoarthritis      NEURO/PSYCH   (+) Heel pain, chronic, right      Nervous and Auditory   (+) Parkinson disease (Moderate to severe sx)      Other   (+) Glaucoma   (+) Spinal stenosis of lumbar region with neurogenic claudication        Physical Exam    Airway    Mallampati score: III  TM Distance: >3 FB  Neck ROM: full     Dental    upper dentures,     Cardiovascular      Pulmonary      Other Findings        Anesthesia Plan  ASA Score- 3     Anesthesia Type- IV sedation with anesthesia with ASA Monitors.         Additional Monitors:     Airway Plan:     Comment: Recent labs personally reviewed:  Lab Results       Component                Value               Date                       WBC                      5.41                02/11/2024                 HGB                      15.4                02/11/2024                 PLT                      178                 02/11/2024            Lab Results       Component                Value               Date                       K                        4.2                 02/21/2024                 BUN                      22                  02/21/2024                 CREATININE               1.13                02/21/2024            Lab Results       Component                Value               Date                       PTT                      32                  02/11/2024             Lab Results       Component                Value               Date                       INR                      0.96                02/11/2024              Blood type A    I, Mirlande Maradiaga MD, have personally seen and evaluated the  patient prior to anesthetic care.  I have reviewed the pre-anesthetic record, medical history, allergies, medications and any other medical records if appropriate to the anesthetic care.  If a CRNA is involved in the case, I have reviewed the CRNA assessment, if present, and agree. Patient consented for IV Sedation, general anesthesia as back up. Discussed risks of aspiration, IV infiltration, indications for conversion to general anesthesia. All questions and concerns addressed.   .       Plan Factors-Exercise tolerance (METS): >4 METS.    Chart reviewed.   Existing labs reviewed. Patient summary reviewed.    Patient is not a current smoker.  Patient did not smoke on day of surgery.    Obstructive sleep apnea risk education given perioperatively.        Induction- intravenous.    Postoperative Plan-     Informed Consent- Anesthetic plan and risks discussed with patient and spouse.  I personally reviewed this patient with the CRNA. Discussed and agreed on the Anesthesia Plan with the CRNA..

## 2024-03-05 NOTE — ANESTHESIA POSTPROCEDURE EVALUATION
Post-Op Assessment Note    CV Status:  Stable  Pain Score: 0    Pain management: adequate       Mental Status:  Arousable and sleepy   Hydration Status:  Euvolemic   PONV Controlled:  Controlled   Airway Patency:  Patent     Post Op Vitals Reviewed: Yes    No anethesia notable event occurred.    Staff: CRNA               BP   132/79   Temp      Pulse 76   Resp 18   SpO2 98% RA

## 2024-03-05 NOTE — INTERVAL H&P NOTE
H&P reviewed. After examining the patient I find no changes in the patients condition since the H&P had been written.    Vitals:    03/05/24 0827   BP: 135/70   Pulse: 78   Resp: 16   Temp: 97.7 °F (36.5 °C)   SpO2: 95%

## 2024-03-07 PROCEDURE — 88305 TISSUE EXAM BY PATHOLOGIST: CPT | Performed by: PATHOLOGY

## 2024-03-26 ENCOUNTER — TELEPHONE (OUTPATIENT)
Age: 89
End: 2024-03-26

## 2024-03-26 NOTE — TELEPHONE ENCOUNTER
Spoke w/ the pt' wife, notified her of results    She will follow up w/ the pt's PCP to see what they should do

## 2024-03-26 NOTE — TELEPHONE ENCOUNTER
Please let her know the biopsies were all completely benign and we would like him to see a surgeon.  We had suggested a referral from his primary care physician during his visit.  His issue may be gallstones, not gastric.

## 2024-03-26 NOTE — TELEPHONE ENCOUNTER
Patients GI provider:  Dr. Richard    Number to return call: [phone]    Reason for call: Pt's wife called in to go over results from patient's EGD performed on 03/05/2024. Patient can be reached at the number above, thank you.    Scheduled procedure/appointment date if applicable: Apt/procedure n/a           FEVER

## 2024-07-18 ENCOUNTER — HOSPITAL ENCOUNTER (EMERGENCY)
Facility: HOSPITAL | Age: 89
Discharge: HOME | End: 2024-07-19
Attending: EMERGENCY MEDICINE
Payer: COMMERCIAL

## 2024-07-18 ENCOUNTER — APPOINTMENT (EMERGENCY)
Dept: CT IMAGING | Facility: HOSPITAL | Age: 89
End: 2024-07-18
Payer: COMMERCIAL

## 2024-07-18 DIAGNOSIS — R11.2 NAUSEA AND VOMITING: Primary | ICD-10-CM

## 2024-07-18 LAB
ALBUMIN SERPL BCG-MCNC: 4.1 G/DL (ref 3.5–5)
ALP SERPL-CCNC: 43 U/L (ref 34–104)
ALT SERPL W P-5'-P-CCNC: 3 U/L (ref 7–52)
ANION GAP SERPL CALCULATED.3IONS-SCNC: 7 MMOL/L (ref 4–13)
AST SERPL W P-5'-P-CCNC: 15 U/L (ref 13–39)
BASOPHILS # BLD AUTO: 0.03 THOUSANDS/ÂΜL (ref 0–0.1)
BASOPHILS NFR BLD AUTO: 0 % (ref 0–1)
BILIRUB SERPL-MCNC: 0.94 MG/DL (ref 0.2–1)
BUN SERPL-MCNC: 17 MG/DL (ref 5–25)
CALCIUM SERPL-MCNC: 9.2 MG/DL (ref 8.4–10.2)
CHLORIDE SERPL-SCNC: 107 MMOL/L (ref 96–108)
CO2 SERPL-SCNC: 26 MMOL/L (ref 21–32)
CREAT SERPL-MCNC: 0.87 MG/DL (ref 0.6–1.3)
EOSINOPHIL # BLD AUTO: 0.07 THOUSAND/ÂΜL (ref 0–0.61)
EOSINOPHIL NFR BLD AUTO: 1 % (ref 0–6)
ERYTHROCYTE [DISTWIDTH] IN BLOOD BY AUTOMATED COUNT: 12.5 % (ref 11.6–15.1)
GFR SERPL CREATININE-BSD FRML MDRD: 77 ML/MIN/1.73SQ M
GLUCOSE SERPL-MCNC: 116 MG/DL (ref 65–140)
HCT VFR BLD AUTO: 38.5 % (ref 36.5–49.3)
HGB BLD-MCNC: 13.5 G/DL (ref 12–17)
IMM GRANULOCYTES # BLD AUTO: 0.03 THOUSAND/UL (ref 0–0.2)
IMM GRANULOCYTES NFR BLD AUTO: 0 % (ref 0–2)
LIPASE SERPL-CCNC: 17 U/L (ref 11–82)
LYMPHOCYTES # BLD AUTO: 0.54 THOUSANDS/ÂΜL (ref 0.6–4.47)
LYMPHOCYTES NFR BLD AUTO: 8 % (ref 14–44)
MCH RBC QN AUTO: 32.7 PG (ref 26.8–34.3)
MCHC RBC AUTO-ENTMCNC: 35.1 G/DL (ref 31.4–37.4)
MCV RBC AUTO: 93 FL (ref 82–98)
MONOCYTES # BLD AUTO: 0.39 THOUSAND/ÂΜL (ref 0.17–1.22)
MONOCYTES NFR BLD AUTO: 6 % (ref 4–12)
NEUTROPHILS # BLD AUTO: 6.05 THOUSANDS/ÂΜL (ref 1.85–7.62)
NEUTS SEG NFR BLD AUTO: 85 % (ref 43–75)
NRBC BLD AUTO-RTO: 0 /100 WBCS
PLATELET # BLD AUTO: 165 THOUSANDS/UL (ref 149–390)
PMV BLD AUTO: 10.4 FL (ref 8.9–12.7)
POTASSIUM SERPL-SCNC: 3.9 MMOL/L (ref 3.5–5.3)
PROT SERPL-MCNC: 6.1 G/DL (ref 6.4–8.4)
RBC # BLD AUTO: 4.13 MILLION/UL (ref 3.88–5.62)
SODIUM SERPL-SCNC: 140 MMOL/L (ref 135–147)
WBC # BLD AUTO: 7.11 THOUSAND/UL (ref 4.31–10.16)

## 2024-07-18 PROCEDURE — 83690 ASSAY OF LIPASE: CPT | Performed by: EMERGENCY MEDICINE

## 2024-07-18 PROCEDURE — 96365 THER/PROPH/DIAG IV INF INIT: CPT

## 2024-07-18 PROCEDURE — 99285 EMERGENCY DEPT VISIT HI MDM: CPT | Performed by: EMERGENCY MEDICINE

## 2024-07-18 PROCEDURE — 96376 TX/PRO/DX INJ SAME DRUG ADON: CPT

## 2024-07-18 PROCEDURE — 36415 COLL VENOUS BLD VENIPUNCTURE: CPT | Performed by: EMERGENCY MEDICINE

## 2024-07-18 PROCEDURE — 85025 COMPLETE CBC W/AUTO DIFF WBC: CPT | Performed by: EMERGENCY MEDICINE

## 2024-07-18 PROCEDURE — 74177 CT ABD & PELVIS W/CONTRAST: CPT

## 2024-07-18 PROCEDURE — 99284 EMERGENCY DEPT VISIT MOD MDM: CPT

## 2024-07-18 PROCEDURE — 96375 TX/PRO/DX INJ NEW DRUG ADDON: CPT

## 2024-07-18 PROCEDURE — 80053 COMPREHEN METABOLIC PANEL: CPT | Performed by: EMERGENCY MEDICINE

## 2024-07-18 PROCEDURE — 70450 CT HEAD/BRAIN W/O DYE: CPT

## 2024-07-18 RX ORDER — ONDANSETRON 2 MG/ML
4 INJECTION INTRAMUSCULAR; INTRAVENOUS ONCE
Status: COMPLETED | OUTPATIENT
Start: 2024-07-18 | End: 2024-07-18

## 2024-07-18 RX ADMIN — ONDANSETRON 4 MG: 2 INJECTION INTRAMUSCULAR; INTRAVENOUS at 20:38

## 2024-07-18 RX ADMIN — ONDANSETRON 4 MG: 2 INJECTION INTRAMUSCULAR; INTRAVENOUS at 23:51

## 2024-07-18 RX ADMIN — IOHEXOL 100 ML: 350 INJECTION, SOLUTION INTRAVENOUS at 20:55

## 2024-07-18 RX ADMIN — SODIUM CHLORIDE, SODIUM LACTATE, POTASSIUM CHLORIDE, AND CALCIUM CHLORIDE 1000 ML: .6; .31; .03; .02 INJECTION, SOLUTION INTRAVENOUS at 20:37

## 2024-07-19 VITALS
WEIGHT: 177.03 LBS | BODY MASS INDEX: 24.01 KG/M2 | DIASTOLIC BLOOD PRESSURE: 76 MMHG | TEMPERATURE: 98.1 F | OXYGEN SATURATION: 97 % | HEART RATE: 80 BPM | SYSTOLIC BLOOD PRESSURE: 152 MMHG | RESPIRATION RATE: 20 BRPM

## 2024-07-19 RX ORDER — ONDANSETRON 4 MG/1
4 TABLET, ORALLY DISINTEGRATING ORAL EVERY 6 HOURS PRN
Qty: 20 TABLET | Refills: 0 | Status: SHIPPED | OUTPATIENT
Start: 2024-07-19

## 2024-07-19 NOTE — ED PROVIDER NOTES
History  Chief Complaint   Patient presents with    Vomiting     C/o N/V since last night. Unable to tolerate PO intake.        Vomiting      Prior to Admission Medications   Prescriptions Last Dose Informant Patient Reported? Taking?   Multiple Vitamins-Minerals (CENTRUM SILVER PO)  Self Yes No   Sig: Take by mouth daily   Rytary 48. MG CPCR  Self Yes No   docusate sodium (COLACE) 100 mg capsule  Self No No   Sig: Take 1 capsule (100 mg total) by mouth 2 (two) times a day   famotidine (PEPCID) 20 mg/2.5 mL oral suspension   No No   Sig: Take 5 mL (40 mg total) by mouth daily at bedtime   losartan (COZAAR) 25 mg tablet  Self Yes No   Sig: Take 25 mg by mouth daily   meloxicam (MOBIC) 15 mg tablet  Self Yes No   ondansetron (ZOFRAN-ODT) 4 mg disintegrating tablet  Self No No   Sig: Take 1 tablet (4 mg total) by mouth every 6 (six) hours as needed for nausea or vomiting   pantoprazole (PROTONIX) 40 mg tablet   No No   Sig: Take 1 tablet (40 mg total) by mouth daily   polyethylene glycol (GLYCOLAX) 17 GM/SCOOP powder  Self No No   Sig: Take 17 g by mouth as needed (constipation) Take two doses of 17 g mixed with eight ounces of water each initially, then one dose per hour until you have a bowel movement.   sucralfate (CARAFATE) 1 g tablet  Self No No   Sig: Take 1 tablet (1 g total) by mouth 3 (three) times a day      Facility-Administered Medications: None       Past Medical History:   Diagnosis Date    Arthritis     Bladder stones     Hypertension     Parkinson disease        Past Surgical History:   Procedure Laterality Date    COLONOSCOPY      FOOT SURGERY      [place] CYSTOLITHOTOMY CYSTOTOMY W/RMVL CALCULUS N/A 8/5/2021    Procedure: CYSTOLITHOTOMY OPEN;  Surgeon: Adiel Niño MD;  Location: [facility];  Service: Urology    [place] TRURL ELECTROSURG RESCJ PROSTATE BLEED COMPLETE N/A 8/5/2021    Procedure: TRANSURETHRAL RESECTION OF PROSTATE (TURP);  Surgeon: Adiel Niño MD;  Location: [facility];  Service:  Urology    TONSILLECTOMY         Family History   Problem Relation Age of Onset    No Known Problems Mother     Lung cancer Father     No Known Problems Sister     No Known Problems Sister     Lung cancer Sister      I have reviewed and agree with the history as documented.    E-Cigarette/Vaping    E-Cigarette Use Never User      E-Cigarette/Vaping Substances    Nicotine No     THC No     CBD No     Flavoring No     Other No     Unknown No      Social History     Tobacco Use    Smoking status: Never    Smokeless tobacco: Never   Vaping Use    Vaping status: Never Used   Substance Use Topics    Alcohol use: Never    Drug use: Never       Review of Systems   Gastrointestinal:  Positive for vomiting.       Physical Exam  Physical Exam  Vitals and nursing note reviewed.   Constitutional:       General: He is not in acute distress.     Appearance: He is well-developed.      Comments: Frail appearing   HENT:      Head: Normocephalic and atraumatic.      Mouth/Throat:      Mouth: Mucous membranes are dry.      Comments: Missing multiple teeth. Mildly dry mucous membranes  Eyes:      Conjunctiva/sclera: Conjunctivae normal.      Pupils: Pupils are equal, round, and reactive to light.   Neck:      Trachea: No tracheal deviation.   Cardiovascular:      Rate and Rhythm: Normal rate and regular rhythm.      Heart sounds: Normal heart sounds.   Pulmonary:      Effort: Pulmonary effort is normal. No respiratory distress.      Breath sounds: Normal breath sounds.   Abdominal:      General: Bowel sounds are normal. There is no distension.      Palpations: Abdomen is soft.      Tenderness: There is abdominal tenderness (very mild) in the epigastric area. There is no right CVA tenderness, left CVA tenderness, guarding or rebound. Negative signs include Dubois's sign.   Musculoskeletal:      Cervical back: Normal range of motion.   Skin:     General: Skin is warm and dry.   Neurological:      Mental Status: He is alert and oriented to  person, place, and time.      GCS: GCS eye subscore is 4. GCS verbal subscore is 5. GCS motor subscore is 6.   Psychiatric:         Mood and Affect: Mood and affect normal.         Behavior: Behavior normal.         Vital Signs  ED Triage Vitals [07/18/24 1911]   Temperature Pulse Respirations Blood Pressure SpO2   98.1 °F (36.7 °C) 85 18 167/78 98 %      Temp Source Heart Rate Source Patient Position - Orthostatic VS BP Location FiO2 (%)   Oral Monitor Sitting Left arm --      Pain Score       --           Vitals:    07/18/24 1911 07/18/24 2100   BP: 167/78 151/78   Pulse: 85 81   Patient Position - Orthostatic VS: Sitting          Visual Acuity      ED Medications  Medications   ondansetron (ZOFRAN) injection 4 mg (has no administration in time range)   ondansetron (ZOFRAN) injection 4 mg (4 mg Intravenous Given 7/18/24 2038)   lactated ringers bolus 1,000 mL (0 mL Intravenous Stopped 7/18/24 2137)   iohexol (OMNIPAQUE) 350 MG/ML injection (MULTI-DOSE) 100 mL (100 mL Intravenous Given 7/18/24 2055)       Diagnostic Studies  Results Reviewed       Procedure Component Value Units Date/Time    Comprehensive metabolic panel [[id number]]  (Abnormal) Collected: 07/18/24 2010    Lab Status: Final result Specimen: Blood from Arm, Right Updated: 07/18/24 2039     Sodium 140 mmol/L      Potassium 3.9 mmol/L      Chloride 107 mmol/L      CO2 26 mmol/L      ANION GAP 7 mmol/L      BUN 17 mg/dL      Creatinine 0.87 mg/dL      Glucose 116 mg/dL      Calcium 9.2 mg/dL      AST 15 U/L      ALT 3 U/L      Alkaline Phosphatase 43 U/L      Total Protein 6.1 g/dL      Albumin 4.1 g/dL      Total Bilirubin 0.94 mg/dL      eGFR 77 ml/min/1.73sq m     Narrative:      National Kid[employer] guidelines for Chronic Kidney Disease (CKD):     Stage 1 with normal or high GFR (GFR > 90 mL/min/1.73 square meters)    Stage 2 Mild CKD (GFR = 60-89 mL/min/1.73 square meters)    Stage 3A Moderate CKD (GFR = 45-59 mL/min/1.73 square  meters)    Stage 3B Moderate CKD (GFR = 30-44 mL/min/1.73 square meters)    Stage 4 Severe CKD (GFR = 15-29 mL/min/1.73 square meters)    Stage 5 End Stage CKD (GFR <15 mL/min/1.73 square meters)  Note: GFR calculation is accurate only with a steady state creatinine    Lipase [[id number]]  (Normal) Collected: 07/18/24 2010    Lab Status: Final result Specimen: Blood from Arm, Right Updated: 07/18/24 2039     Lipase 17 u/L     CBC and differential [[id number]]  (Abnormal) Collected: 07/18/24 2010    Lab Status: Final result Specimen: Blood from Arm, Right Updated: 07/18/24 2015     WBC 7.11 Thousand/uL      RBC 4.13 Million/uL      Hemoglobin 13.5 g/dL      Hematocrit 38.5 %      MCV 93 fL      MCH 32.7 pg      MCHC 35.1 g/dL      RDW 12.5 %      MPV 10.4 fL      Platelets 165 Thousands/uL      nRBC 0 /100 WBCs      Segmented % 85 %      Immature Grans % 0 %      Lymphocytes % 8 %      Monocytes % 6 %      Eosinophils Relative 1 %      Basophils Relative 0 %      Absolute Neutrophils 6.05 Thousands/µL      Absolute Immature Grans 0.03 Thousand/uL      Absolute Lymphocytes 0.54 Thousands/µL      Absolute Monocytes 0.39 Thousand/µL      Eosinophils Absolute 0.07 Thousand/µL      Basophils Absolute 0.03 Thousands/µL                    CT head without contrast   Final Result by John Mas MD (07/18 2230)      No acute intracranial abnormality.                  Workstation performed: [id number]         CT abdomen pelvis with contrast   Final Result by John Mas MD (07/18 2236)      1.  No acute abnormality   2.  Chronic findings as above         Workstation performed: [id number]                    Procedures  Procedures         ED Course                                 SBIRT 20yo+      Flowsheet Row Most Recent Value   Initial Alcohol Screen: US AUDIT-C     1. How often do you have a drink containing alcohol? 0 Filed at: 07/18/2024 1912   2. How many drinks containing alcohol do you have on a typical day you are  drinking?  0 Filed at: 07/18/2024 1912   3a. Male UNDER 65: How often do you have five or more drinks on one occasion? 0 Filed at: 07/18/2024 1912   Audit-C Score 0 Filed at: 07/18/2024 1912   TIFF: How many times in the past year have you...    Used an illegal drug or used a prescription medication for non-medical reasons? Never Filed at: 07/18/2024 1912                      Medical Decision Making  This is an 89-year-old male who presents here today for evaluation of nausea and vomiting.  He says it began overnight and he has vomited multiple times.  He says he is frequently not nauseous, but will then feel nausea coming on and vomit shortly thereafter.  He says it was initially brownish in color but is now more green.  He endorses some abdominal discomfort from vomiting.  He denies any urinary symptoms, diarrhea.  He denies fevers, URI symptoms, chest pain, shortness of breath.  He has not taken anything for his symptoms.  He has a history of frequent falls due to Parkinsons, most recently last night, though did not hit his head.  He takes no blood thinning medications.  He denies headache or injuries from the fall.    Review of systems: Otherwise negative unless stated as above    He is well-appearing, no acute distress.  He has no abdominal tenderness.  Exam is otherwise unremarkable.  Differential includes food poisoning, viral 0, pancreatitis, colitis, enteritis, cholelithiasis/cholecystitis, intracranial hemorrhage.  We will check lab work and CT scans to evaluate, and treat symptoms.    Lab work is unremarkable.  CT scans show no acute abnormalities.  The patient does feel better and has not had any vomiting since medications.  Will attempt p.o. challenge.    The patient was able to drink water but does feel nauseous afterwards.  He has not vomited.  We we will give him additional dose of nausea medication, and have him continue to try to drink fluids.    He continues to feel well after second dose of  Zofran.  He has no recurrence of nausea or vomiting with additional drinking.  I discussed with patient and wife findings, management at home, follow-up with PCP, indications for return, and expressed understanding with this plan.    Problems Addressed:  Nausea and vomiting: acute illness or injury    Amount and/or Complexity of Data Reviewed  Independent Historian: spouse  Labs: ordered. Decision-making details documented in ED Course.  Radiology: ordered and independent interpretation performed. Decision-making details documented in ED Course.    Risk  Prescription drug management.  Decision regarding hospitalization.                 Disposition  Final diagnoses:   None     ED Disposition       None          Follow-up Information    None         Patient's Medications   Discharge Prescriptions    No medications on file       No discharge procedures on file.    PDMP Review         Value Time User    PDMP Reviewed  Yes 8/5/2021  9:17 AM Adiel Niño MD            ED Provider  Electronically Signed by             Jaja Reyes MD  07/19/24 0056

## 2024-07-19 NOTE — DISCHARGE INSTRUCTIONS
Take the nausea medication as needed.  Drink plenty of fluids, take small sips frequently, and eat as you are able to tolerate.  Follow-up with your primary care doctor to make sure you are doing better.  Return if you develop recurrence of vomiting and difficulty tolerating fluids despite the nausea medication, or for any other concerns.

## 2024-09-05 ENCOUNTER — TELEPHONE (OUTPATIENT)
Age: 89
End: 2024-09-05

## 2024-09-05 NOTE — TELEPHONE ENCOUNTER
Pts wife Jaja (on consent) wanted to be in scheduled in [place] only so r/s appt that is on 10/28 w/Dr Trammell. Jaja accepted open slot on 10/16 at 930am w/ Dr Altamirano. Kept on wait list. Address was provided.

## 2024-09-19 ENCOUNTER — HOSPITAL ENCOUNTER (OUTPATIENT)
Dept: RADIOLOGY | Facility: HOSPITAL | Age: 89
End: 2024-09-19
Payer: COMMERCIAL

## 2024-09-19 DIAGNOSIS — R13.10 DYSPHAGIA, UNSPECIFIED TYPE: ICD-10-CM

## 2024-09-19 PROCEDURE — 92611 MOTION FLUOROSCOPY/SWALLOW: CPT

## 2024-09-19 PROCEDURE — 74230 X-RAY XM SWLNG FUNCJ C+: CPT

## 2024-09-19 NOTE — PROCEDURES
Speech Pathology - Modified Barium Swallow Study    Patient Name: Momo Swann    Today's Date: 9/19/2024     Problem List  Active Problems:  There are no active Hospital Problems.      Past Medical History  Past Medical History:   Diagnosis Date    Arthritis     Bladder stones     Hypertension     Parkinson disease        Past Surgical History  Past Surgical History:   Procedure Laterality Date    COLONOSCOPY      FOOT SURGERY      [place] CYSTOLITHOTOMY CYSTOTOMY W/RMVL CALCULUS N/A 8/5/2021    Procedure: CYSTOLITHOTOMY OPEN;  Surgeon: Adiel Niño MD;  Location: [facility];  Service: Urology    [place] TRURL ELECTROSURG RESCJ PROSTATE BLEED COMPLETE N/A 8/5/2021    Procedure: TRANSURETHRAL RESECTION OF PROSTATE (TURP);  Surgeon: Adiel Niño MD;  Location: [facility];  Service: Urology    TONSILLECTOMY       Assessment Summary:    Pt presents with WFL oropharyngeal swallow based off of consistencies tested today. Small Osteophytes C4-5 and C6-7 noted though not observed to impact bolus flow.   Note, while all material noted to clear through the upper esophagus, pt reports of globus sensation +expectoration of barium following the study.  ?retrograde flow ?diverticulum; though not visualized while under fluoro.  Improved swallow function from prev video in Dec 2023 (see below).    Note: Images are available for review in PACS as desired.    Recommendations:   Recommended Diet: regular diet and thin liquids - added sauces/gravy as needed   Recommended Form of Medications: whole with liquid - 1 at a time   Aspiration precautions and compensatory swallowing strategies: upright posture, small bites/sips, and alternating bites and sips  Consider referral to: NA - pt follows w/ ENT and GI  SLP Dysphagia therapy recommended: Y- outpatient; consider LSVT/Speak OUT! For concerns of low vocal volume in the setting of Parkinson's disease.      Results Reviewed with: patient and MD   Pt/Family Education: initiated.  Understanding verbalized.      General Information;  Pt is a 89 y.o. male with a PMH remarkable for VCD, dysphagia, globus sensation. Patient reports significant improvement in globus sensation, reflux, and VCD with bid Pepcid. .    Current concerns for dysphagia include globus sensation w/ solid foods and meds; appears to worsen w/ fatigue as per pt interveiw.  MBS was recommended to assess oropharyngeal stage swallowing skills at this time.     Prior MBS 12/21/23:  Pt presents with mild oropharyngeal dysphagia characterized by prolonged mastication and oral transfer w/ occasional lingual rocking motion.  Swallow initiation is delayed yet generally functional given pt's age.  Collection of pharyngeal retention in the pyriforms and UES.  Note, endoscopy unremarkable for narrowing/oropharyngeal obstruction.  No mention of diverticulum.  Given frequent UES retention, may consider ENT /GI follow up with closer look at upper esophagus r/o  small diverticulum.  Noted small cervical osteophytes C4-5, C6-7 which may be contributing to retention when taking larger quantities (I.e.; mealtime).  No aspiration, penetration or obstruction in bolus flow during the present assessment.  Material cleared with use of liquid wash, dbl swallow and effortful swallows     EGD 3/5/24:  One 2 mm sessile polyp in the greater curve of the stomach; performed cold forceps biopsy with complete en bloc removal  The upper third of the esophagus, middle third of the esophagus, lower third of the esophagus, GE junction, Z-line, cardia, fundus of the stomach, lesser curve of the stomach, incisura, antrum, prepyloric region, pylorus, duodenal bulb, 2nd part of the duodenum and 3rd part of the duodenum appeared normal. Z-line is 42 cm from the incisors.  Performed forceps biopsies in the fundus of the stomach, body of the stomach and antrum    Oral Mechanism Exam  Facial: symmetrical  Labial: decreased ROM left side  Lingual: WFL  Velum:  symmetrical  Mandible: adequate ROM  Dentition: adequate  Vocal quality: weak   Volitional Cough: strong/productive   Respiratory Status: on RA   Tracheostomy: n/a    Pt was viewed sitting upright in the lateral and AP positions. Trials administered were consistent with MBSImP Validated Protocol: Pt was given 20-mL cup sip thin, 40-mL sequential swallow thin, 5-mL pudding, and ½ cookie coated with 3-mL pudding. Pt was also given a barium tablet with thin liquid.        Initial view observations/comments: Cervical bony growths      8-Point Penetration-Aspiration Scale   Thin liquid 1 - Material does not enter the airway   Nectar thick liquid 1 - Material does not enter the airway   Honey thick liquid 1 - Material does not enter the airway   Puree (pudding) 1 - Material does not enter the airway   Solid 1 - Material does not enter the airway     Strategies and Efficacy: NA    Aspiration Response and Efficacy:  NA    MBS IMP Rating    ORAL Impairment  Compinent 1--Lip Closure  Judged at any point during the swallow.  0 - No labial escape    Component 2--Tongue Control During Bolus Hold  Judged on held liquid boluses only and prior to productive tongue movement.   0 - Cohesive bolus between tongue to palatal seal    Component 3--Bolus Preparation/Mastication  Judged only during presentation of 1/2 shortbread cookie coated in pudding.   1 - Slow prolonged chewing/mashing with complete re-collection    Component 4--Bolus Transport/Lingual Motion  Judged after first productive tongue movement for oral bolus transport.  1 - Delayed initiation of tongue motion    Component 5--Oral Residue  Judged after first swallow or after the last swallow of the sequential swallow task.  1 - Trace residue lining oral structures   Location   C - Tongue    Component 6--Initiation of Pharyngeal Swallow  Judged at first movement of the brisk superior-anterior hyoid trajectory.  1 - Bolus Head in valleculae (solids during  mastication)      PHARYNGEAL Impairment  Component 7--Soft Palate Elevation  Judged during maximum displacement of soft palate.  0 - No bolus between the soft palate (SP)/pharyngeal wall (PW)    Component 8--Laryngeal Elevation  Judged when epiglottis is in its most horizontal position.  0 - Complete superior movement of thyroid cartilage with complete approximation of arytenoids to epiglottic petiole    Component 9--Anterior Hyoid Excursion  Judged at height of swallow/maximal anterior hyoid displacement.  0 - Complete anterior movement    Component 10--Epiglottic Movement  Judged at height of swallow/maximal anterior hyoid displacement.  0 - Complete inversion    Component 11--Laryngeal Vestibular Closure  Judged at height of swallow/maximal anterior hyoid displacement.  0 - Complete; no air/contrast in laryngeal vestibule    Component 12--Pharyngeal Stripping Wave  Judged during the full duration of the pharyngeal swallow.  0 - Present - complete    Component 13--Pharyngeal Contraction  NA- lateral view only     Component 14--Pharyngoesophageal Segment Opening  Judged during maximum distension of PES and throughout opening and closure.  0 - Complete distension and complete duration; no obstruction of flow    Component 15--Tongue Base (TB) Retraction  Judged during maximum retraction of the tongue base.  1 - Trace column of contrast or air between TB and PW    Component 16--Pharyngeal Residue  Judged after first swallow or after the last swallow of the sequential swallow task.  1 - Trace residue within or on pharyngeal structures   Location   B - Valleculae      ESOPHAGEAL Impairment  Component 17--Esophageal Clearance Upright Position  Judged in AP view during bolus transit through the oral cavity to the LES  0 - Complete clearance; esophageal coating        Catarina Beebe MS, CCC-SLP  Speech-Language Pathologist  [place] #[id number]  [place] #[id number]

## 2024-10-09 ENCOUNTER — APPOINTMENT (EMERGENCY)
Dept: CT IMAGING | Facility: HOSPITAL | Age: 89
End: 2024-10-09
Payer: COMMERCIAL

## 2024-10-09 ENCOUNTER — HOSPITAL ENCOUNTER (EMERGENCY)
Facility: HOSPITAL | Age: 89
Discharge: HOME | End: 2024-10-09
Attending: EMERGENCY MEDICINE
Payer: COMMERCIAL

## 2024-10-09 VITALS
TEMPERATURE: 98.1 F | DIASTOLIC BLOOD PRESSURE: 72 MMHG | HEART RATE: 93 BPM | OXYGEN SATURATION: 92 % | SYSTOLIC BLOOD PRESSURE: 141 MMHG | RESPIRATION RATE: 20 BRPM

## 2024-10-09 DIAGNOSIS — U07.1 COVID-19: Primary | ICD-10-CM

## 2024-10-09 LAB
ALBUMIN SERPL BCG-MCNC: 4.2 G/DL (ref 3.5–5)
ALP SERPL-CCNC: 56 U/L (ref 34–104)
ALT SERPL W P-5'-P-CCNC: 5 U/L (ref 7–52)
ANION GAP SERPL CALCULATED.3IONS-SCNC: 7 MMOL/L (ref 4–13)
AST SERPL W P-5'-P-CCNC: 25 U/L (ref 13–39)
BACTERIA UR QL AUTO: ABNORMAL /HPF
BASOPHILS # BLD AUTO: 0 THOUSANDS/ΜL (ref 0–0.1)
BASOPHILS NFR BLD AUTO: 0 % (ref 0–1)
BILIRUB SERPL-MCNC: 0.75 MG/DL (ref 0.2–1)
BILIRUB UR QL STRIP: NEGATIVE
BUN SERPL-MCNC: 17 MG/DL (ref 5–25)
CALCIUM SERPL-MCNC: 9.1 MG/DL (ref 8.4–10.2)
CHLORIDE SERPL-SCNC: 103 MMOL/L (ref 96–108)
CLARITY UR: CLEAR
CO2 SERPL-SCNC: 29 MMOL/L (ref 21–32)
COLOR UR: YELLOW
CREAT SERPL-MCNC: 1.05 MG/DL (ref 0.6–1.3)
EOSINOPHIL # BLD AUTO: 0.02 THOUSAND/ΜL (ref 0–0.61)
EOSINOPHIL NFR BLD AUTO: 0 % (ref 0–6)
ERYTHROCYTE [DISTWIDTH] IN BLOOD BY AUTOMATED COUNT: 12.2 % (ref 11.6–15.1)
FLUAV AG UPPER RESP QL IA.RAPID: NEGATIVE
FLUBV AG UPPER RESP QL IA.RAPID: NEGATIVE
GFR SERPL CREATININE-BSD FRML MDRD: 63 ML/MIN/1.73SQ M
GLUCOSE SERPL-MCNC: 103 MG/DL (ref 65–140)
GLUCOSE UR STRIP-MCNC: NEGATIVE MG/DL
HCT VFR BLD AUTO: 41.6 % (ref 36.5–49.3)
HGB BLD-MCNC: 14.2 G/DL (ref 12–17)
HGB UR QL STRIP.AUTO: NEGATIVE
IMM GRANULOCYTES # BLD AUTO: 0.03 THOUSAND/UL (ref 0–0.2)
IMM GRANULOCYTES NFR BLD AUTO: 0 % (ref 0–2)
KETONES UR STRIP-MCNC: ABNORMAL MG/DL
LACTATE SERPL-SCNC: 1.2 MMOL/L (ref 0.5–2)
LEUKOCYTE ESTERASE UR QL STRIP: ABNORMAL
LYMPHOCYTES # BLD AUTO: 0.28 THOUSANDS/ΜL (ref 0.6–4.47)
LYMPHOCYTES NFR BLD AUTO: 4 % (ref 14–44)
MCH RBC QN AUTO: 32.1 PG (ref 26.8–34.3)
MCHC RBC AUTO-ENTMCNC: 34.1 G/DL (ref 31.4–37.4)
MCV RBC AUTO: 94 FL (ref 82–98)
MONOCYTES # BLD AUTO: 0.71 THOUSAND/ΜL (ref 0.17–1.22)
MONOCYTES NFR BLD AUTO: 10 % (ref 4–12)
MUCOUS THREADS UR QL AUTO: ABNORMAL
NEUTROPHILS # BLD AUTO: 6.45 THOUSANDS/ΜL (ref 1.85–7.62)
NEUTS SEG NFR BLD AUTO: 86 % (ref 43–75)
NITRITE UR QL STRIP: NEGATIVE
NON-SQ EPI CELLS URNS QL MICRO: ABNORMAL /HPF
NRBC BLD AUTO-RTO: 0 /100 WBCS
PH UR STRIP.AUTO: 6.5 [PH]
PLATELET # BLD AUTO: 145 THOUSANDS/UL (ref 149–390)
PMV BLD AUTO: 10.9 FL (ref 8.9–12.7)
POTASSIUM SERPL-SCNC: 4 MMOL/L (ref 3.5–5.3)
PROT SERPL-MCNC: 6.4 G/DL (ref 6.4–8.4)
PROT UR STRIP-MCNC: ABNORMAL MG/DL
RBC # BLD AUTO: 4.42 MILLION/UL (ref 3.88–5.62)
RBC #/AREA URNS AUTO: ABNORMAL /HPF
SARS-COV+SARS-COV-2 AG RESP QL IA.RAPID: POSITIVE
SODIUM SERPL-SCNC: 139 MMOL/L (ref 135–147)
SP GR UR STRIP.AUTO: 1.03 (ref 1–1.03)
UROBILINOGEN UR STRIP-ACNC: 2 MG/DL
WBC # BLD AUTO: 7.49 THOUSAND/UL (ref 4.31–10.16)
WBC #/AREA URNS AUTO: ABNORMAL /HPF

## 2024-10-09 PROCEDURE — 83605 ASSAY OF LACTIC ACID: CPT | Performed by: EMERGENCY MEDICINE

## 2024-10-09 PROCEDURE — 96374 THER/PROPH/DIAG INJ IV PUSH: CPT

## 2024-10-09 PROCEDURE — 99284 EMERGENCY DEPT VISIT MOD MDM: CPT

## 2024-10-09 PROCEDURE — 70450 CT HEAD/BRAIN W/O DYE: CPT

## 2024-10-09 PROCEDURE — 36415 COLL VENOUS BLD VENIPUNCTURE: CPT | Performed by: EMERGENCY MEDICINE

## 2024-10-09 PROCEDURE — 87811 SARS-COV-2 COVID19 W/OPTIC: CPT | Performed by: EMERGENCY MEDICINE

## 2024-10-09 PROCEDURE — 85025 COMPLETE CBC W/AUTO DIFF WBC: CPT | Performed by: EMERGENCY MEDICINE

## 2024-10-09 PROCEDURE — 96375 TX/PRO/DX INJ NEW DRUG ADDON: CPT

## 2024-10-09 PROCEDURE — 99285 EMERGENCY DEPT VISIT HI MDM: CPT | Performed by: EMERGENCY MEDICINE

## 2024-10-09 PROCEDURE — 87804 INFLUENZA ASSAY W/OPTIC: CPT | Performed by: EMERGENCY MEDICINE

## 2024-10-09 PROCEDURE — 81001 URINALYSIS AUTO W/SCOPE: CPT | Performed by: EMERGENCY MEDICINE

## 2024-10-09 PROCEDURE — 96360 HYDRATION IV INFUSION INIT: CPT

## 2024-10-09 PROCEDURE — 80053 COMPREHEN METABOLIC PANEL: CPT | Performed by: EMERGENCY MEDICINE

## 2024-10-09 PROCEDURE — 93005 ELECTROCARDIOGRAM TRACING: CPT

## 2024-10-09 RX ORDER — ACETAMINOPHEN 10 MG/ML
1000 INJECTION, SOLUTION INTRAVENOUS ONCE
Status: COMPLETED | OUTPATIENT
Start: 2024-10-09 | End: 2024-10-09

## 2024-10-09 RX ORDER — ONDANSETRON 2 MG/ML
4 INJECTION INTRAMUSCULAR; INTRAVENOUS ONCE
Status: COMPLETED | OUTPATIENT
Start: 2024-10-09 | End: 2024-10-09

## 2024-10-09 RX ORDER — ONDANSETRON 4 MG/1
4 TABLET, FILM COATED ORAL EVERY 8 HOURS PRN
Qty: 20 TABLET | Refills: 0 | Status: SHIPPED | OUTPATIENT
Start: 2024-10-09

## 2024-10-09 RX ADMIN — SODIUM CHLORIDE 1000 ML: 0.9 INJECTION, SOLUTION INTRAVENOUS at 19:52

## 2024-10-09 RX ADMIN — ONDANSETRON 4 MG: 2 INJECTION INTRAMUSCULAR; INTRAVENOUS at 19:56

## 2024-10-09 RX ADMIN — ACETAMINOPHEN 1000 MG: 1000 INJECTION, SOLUTION INTRAVENOUS at 19:54

## 2024-10-09 NOTE — ED PROVIDER NOTES
Final diagnoses:   COVID-19     ED Disposition       ED Disposition   Discharge    Condition   Stable    Date/Time   Wed Oct 9, 2024  9:13 PM    Comment   Momo Swann discharge to home/self care.                   Assessment & Plan       Medical Decision Making  Is an 89-year-old male who presents to the emergency department with increased somnolence, and febrile illness.  His wife supplies the history.  She states that their daughter was recently ill while visiting and he developed cough, congestion and URI complaints.  Today he developed nausea with mildly decreased p.o. intake and intermittent vomiting.  Subjective fever noted at home relieved with OTC Excedrin.  Cough has been productive of clear sputum.  Denies significant shortness of breath.  Patient presents today with constellation of complaints w/ broad differential most consistent with possible viral syndrome however UTI, ICH, pneumonia, though not limited to these.   Physical exam is generally reassuring with adequate peripheral perfusion, no evidence of respiratory distress however is somewhat somnolent. Oriented x2 when speaking to examiner. Low threshold to consider admission pending clinical response.    Symptoamtic management was discussed in detail with patient/parents/guardian. The following medications were prescribed, zofran, to assist with symptom control at home. Return precautions were discussed in detail.       Amount and/or Complexity of Data Reviewed  Labs: ordered. Decision-making details documented in ED Course.  Radiology: ordered and independent interpretation performed.     Details: No ICH appreciated  ECG/medicine tests: independent interpretation performed.     Details: Normal sinus rhythm at 91 bpm, normal axis, widened QRS consistent with right bundle branch block, abnormal EKG large prior    Risk  Prescription drug management.        ED Course as of 10/09/24 2332   Wed Oct 09, 2024   2053 FLU/COVID Rapid Antigen (30 min. TAT) -  Preferred screening test in ED(!)   2053 UA (URINE) with reflex to Scope(!)   2053 Urine Microscopic(!)   2053 Lactic acid, plasma (w/reflex if result > 2.0)   2053 CBC and differential(!)   2053 Comprehensive metabolic panel(!)   2109 Patient symptomatically improved w/ fluids and zofran.Family reassured by workup. Discussed COVID swab and symptomatic management. Stable for d/c       Medications   sodium chloride 0.9 % bolus 1,000 mL (0 mL Intravenous Stopped 10/9/24 2052)   ondansetron (ZOFRAN) injection 4 mg (4 mg Intravenous Given 10/9/24 1956)   acetaminophen (Ofirmev) injection 1,000 mg (0 mg Intravenous Stopped 10/9/24 2009)       ED Risk Strat Scores                                               History of Present Illness       Chief Complaint   Patient presents with    Altered Mental Status     Per EMS pt's family reports pt has not been feeling well since this morning. Had breakfast, lied down and hasn't beem feeling well since. Has hx of parkinsons x 4-5 yrs. Pt A&O x 3 upon arrival.       Past Medical History:   Diagnosis Date    Arthritis     Bladder stones     Hypertension     Parkinson disease (HCC)       Past Surgical History:   Procedure Laterality Date    COLONOSCOPY      FOOT SURGERY      MS CYSTOLITHOTOMY CYSTOTOMY W/RMVL CALCULUS N/A 8/5/2021    Procedure: CYSTOLITHOTOMY OPEN;  Surgeon: Adiel Niño MD;  Location: [facility];  Service: Urology    MS TRURL ELECTROSURG RESCJ PROSTATE BLEED COMPLETE N/A 8/5/2021    Procedure: TRANSURETHRAL RESECTION OF PROSTATE (TURP);  Surgeon: Adiel Niño MD;  Location: [facility];  Service: Urology    TONSILLECTOMY        Family History   Problem Relation Age of Onset    No Known Problems Mother     Lung cancer Father     No Known Problems Sister     No Known Problems Sister     Lung cancer Sister       Social History     Tobacco Use    Smoking status: Never    Smokeless tobacco: Never   Vaping Use    Vaping status: Never Used   Substance Use Topics     Alcohol use: Never    Drug use: Never      E-Cigarette/Vaping    E-Cigarette Use Never User       E-Cigarette/Vaping Substances    Nicotine No     THC No     CBD No     Flavoring No     Other No     Unknown No       I have reviewed and agree with the history as documented.     Patient is an 89-year-old male who presents to the emergency department with increasing somnolence in the setting of known sick contacts      History provided by:  Spouse      Review of Systems   Constitutional:  Positive for chills, fatigue and fever.   Respiratory:  Positive for cough.    Neurological:  Positive for weakness.           Objective       ED Triage Vitals [10/09/24 1833]   Temperature Pulse Blood Pressure Respirations SpO2 Patient Position - Orthostatic VS   98.1 °F (36.7 °C) 88 141/76 16 96 % Lying      Temp src Heart Rate Source BP Location FiO2 (%) Pain Score    -- Monitor Right arm -- --      Vitals      Date and Time Temp Pulse SpO2 Resp BP Pain Score FACES Pain Rating User   10/09/24 2015 -- 93 92 % 20 141/72 -- --    10/09/24 1845 -- 91 95 % 20 141/76 -- --    10/09/24 1833 98.1 °F (36.7 °C) 88 96 % 16 141/76 -- -- SN            Physical Exam  Vitals and nursing note reviewed.   Constitutional:       General: He is not in acute distress.     Appearance: Normal appearance.   HENT:      Head: Normocephalic and atraumatic.      Right Ear: External ear normal.      Left Ear: External ear normal.      Nose: Nose normal.   Cardiovascular:      Rate and Rhythm: Normal rate and regular rhythm.   Pulmonary:      Effort: Pulmonary effort is normal.      Breath sounds: Normal breath sounds.   Abdominal:      General: There is no distension.      Palpations: Abdomen is soft.      Tenderness: There is no abdominal tenderness.   Musculoskeletal:      Right lower leg: No edema.      Left lower leg: No edema.   Skin:     General: Skin is warm and dry.   Neurological:      General: No focal deficit present.      Mental Status: He is  oriented to person, place, and time.      GCS: GCS eye subscore is 3. GCS verbal subscore is 5. GCS motor subscore is 6.      Comments: Somnolent but arousable to voice   Psychiatric:         Behavior: Behavior normal.         Results Reviewed       Procedure Component Value Units Date/Time    Urine Microscopic [[id number]]  (Abnormal) Collected: 10/09/24 2010    Lab Status: Final result Specimen: Urine, Clean Catch Updated: 10/09/24 2021     RBC, UA 4-10 /hpf      WBC, UA 10-20 /hpf      Epithelial Cells Occasional /hpf      Bacteria, UA None Seen /hpf      MUCUS THREADS Occasional    FLU/COVID Rapid Antigen (30 min. TAT) - Preferred screening test in ED [[id number]]  (Abnormal) Collected: 10/09/24 1927    Lab Status: Final result Specimen: Nares from Nose Updated: 10/09/24 2017     SARS COV Rapid Antigen Positive     Influenza A Rapid Antigen Negative     Influenza B Rapid Antigen Negative    Narrative:      This test has been performed using the [employer] Sindhu 2 FLU+SARS Antigen test under the Emergency Use Authorization (EUA). This test has been validated by the  and verified by the performing laboratory. The Sindhu uses lateral flow immunofluorescent sandwich assay to detect SARS-COV, Influenza A and Influenza B Antigen.     The Quidel Sindhu 2 SARS Antigen test does not differentiate between SARS-CoV and SARS-CoV-2.     Negative results are presumptive and may be confirmed with a molecular assay, if necessary, for patient management. Negative results do not rule out SARS-CoV-2 or influenza infection and should not be used as the sole basis for treatment or patient management decisions. A negative test result may occur if the level of antigen in a sample is below the limit of detection of this test.     Positive results are indicative of the presence of viral antigens, but do not rule out bacterial infection or co-infection with other viruses.     All test results should be used as an adjunct to clinical  observations and other information available to the provider.    FOR PEDIATRIC PATIENTS - copy/paste COVID Guidelines URL to browser: [email or web address]    UA (URINE) with reflex to Scope [[id number]]  (Abnormal) Collected: 10/09/24 2010    Lab Status: Final result Specimen: Urine, Clean Catch Updated: 10/09/24 2016     Color, UA Yellow     Clarity, UA Clear     Specific Gravity, UA 1.030     pH, UA 6.5     Leukocytes, UA Small     Nitrite, UA Negative     Protein, UA Trace mg/dl      Glucose, UA Negative mg/dl      Ketones, UA 10 (1+) mg/dl      Urobilinogen, UA 2.0 mg/dl      Bilirubin, UA Negative     Occult Blood, UA Negative    Lactic acid, plasma (w/reflex if result > 2.0) [[id number]]  (Normal) Collected: 10/09/24 1927    Lab Status: Final result Specimen: Blood from Arm, Left Updated: 10/09/24 2004     LACTIC ACID 1.2 mmol/L     Narrative:      Result may be elevated if tourniquet was used during collection.    Comprehensive metabolic panel [[id number]]  (Abnormal) Collected: 10/09/24 1927    Lab Status: Final result Specimen: Blood from Arm, Left Updated: 10/09/24 1955     Sodium 139 mmol/L      Potassium 4.0 mmol/L      Chloride 103 mmol/L      CO2 29 mmol/L      ANION GAP 7 mmol/L      BUN 17 mg/dL      Creatinine 1.05 mg/dL      Glucose 103 mg/dL      Calcium 9.1 mg/dL      AST 25 U/L      ALT 5 U/L      Alkaline Phosphatase 56 U/L      Total Protein 6.4 g/dL      Albumin 4.2 g/dL      Total Bilirubin 0.75 mg/dL      eGFR 63 ml/min/1.73sq m     Narrative:      National Kidney Disease Foundation guidelines for Chronic Kidney Disease (CKD):     Stage 1 with normal or high GFR (GFR > 90 mL/min/1.73 square meters)    Stage 2 Mild CKD (GFR = 60-89 mL/min/1.73 square meters)    Stage 3A Moderate CKD (GFR = 45-59 mL/min/1.73 square meters)    Stage 3B Moderate CKD (GFR = 30-44 mL/min/1.73 square meters)    Stage 4 Severe CKD (GFR = 15-29 mL/min/1.73 square  meters)    Stage 5 End Stage CKD (GFR <15 mL/min/1.73 square meters)  Note: GFR calculation is accurate only with a steady state creatinine    CBC and differential [[id number]]  (Abnormal) Collected: 10/09/24 1927    Lab Status: Final result Specimen: Blood from Arm, Left Updated: 10/09/24 1936     WBC 7.49 Thousand/uL      RBC 4.42 Million/uL      Hemoglobin 14.2 g/dL      Hematocrit 41.6 %      MCV 94 fL      MCH 32.1 pg      MCHC 34.1 g/dL      RDW 12.2 %      MPV 10.9 fL      Platelets 145 Thousands/uL      nRBC 0 /100 WBCs      Segmented % 86 %      Immature Grans % 0 %      Lymphocytes % 4 %      Monocytes % 10 %      Eosinophils Relative 0 %      Basophils Relative 0 %      Absolute Neutrophils 6.45 Thousands/µL      Absolute Immature Grans 0.03 Thousand/uL      Absolute Lymphocytes 0.28 Thousands/µL      Absolute Monocytes 0.71 Thousand/µL      Eosinophils Absolute 0.02 Thousand/µL      Basophils Absolute 0.00 Thousands/µL             CT head without contrast   Final Interpretation by Brigid Bell MD (10/09 1958)      No acute intracranial abnormality. 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                  Workstation performed: [id number]             Procedures    ED Medication and Procedure Management   Prior to Admission Medications   Prescriptions Last Dose Informant Patient Reported? Taking?   Multiple Vitamins-Minerals (CENTRUM SILVER PO)  Self Yes No   Sig: Take by mouth daily   Rytary 48. MG CPCR  Self Yes No   docusate sodium (COLACE) 100 mg capsule  Self No No   Sig: Take 1 capsule (100 mg total) by mouth 2 (two) times a day   famotidine (PEPCID) 40 MG tablet   No No   Sig: Take 1 tablet (40 mg total) by mouth daily at bedtime   losartan (COZAAR) 25 mg tablet  Self Yes No   Sig: Take 25 mg by mouth daily   meloxicam (MOBIC) 15 mg tablet  Self Yes No   ondansetron (ZOFRAN-ODT) 4 mg disintegrating tablet  Self No No   Sig: Take 1  tablet (4 mg total) by mouth every 6 (six) hours as needed for nausea or vomiting   ondansetron (ZOFRAN-ODT) 4 mg disintegrating tablet   No No   Sig: Take 1 tablet (4 mg total) by mouth every 6 (six) hours as needed for nausea or vomiting   pantoprazole (PROTONIX) 40 mg tablet   No No   Sig: Take 1 tablet (40 mg total) by mouth daily   polyethylene glycol (GLYCOLAX) 17 GM/SCOOP powder  Self No No   Sig: Take 17 g by mouth as needed (constipation) Take two doses of 17 g mixed with eight ounces of water each initially, then one dose per hour until you have a bowel movement.   sucralfate (CARAFATE) 1 g tablet  Self No No   Sig: Take 1 tablet (1 g total) by mouth 3 (three) times a day   triamcinolone (KENALOG) 0.1 % oral topical paste   No No   Sig: Apply 1 Application topically 2 (two) times a day      Facility-Administered Medications: None     Discharge Medication List as of 10/9/2024  9:14 PM        START taking these medications    Details   ondansetron (ZOFRAN) 4 mg tablet Take 1 tablet (4 mg total) by mouth every 8 (eight) hours as needed for nausea or vomiting, Starting Wed 10/9/2024, Normal           CONTINUE these medications which have NOT CHANGED    Details   docusate sodium (COLACE) 100 mg capsule Take 1 capsule (100 mg total) by mouth 2 (two) times a day, Starting Sat 8/14/2021, Normal      famotidine (PEPCID) 40 MG tablet Take 1 tablet (40 mg total) by mouth daily at bedtime, Starting Mon 7/22/2024, Normal      losartan (COZAAR) 25 mg tablet Take 25 mg by mouth daily, Historical Med      meloxicam (MOBIC) 15 mg tablet Historical Med      Multiple Vitamins-Minerals (CENTRUM SILVER PO) Take by mouth daily, Historical Med      !! ondansetron (ZOFRAN-ODT) 4 mg disintegrating tablet Take 1 tablet (4 mg total) by mouth every 6 (six) hours as needed for nausea or vomiting, Starting Sun 2/11/2024, Print      !! ondansetron (ZOFRAN-ODT) 4 mg disintegrating tablet Take 1 tablet (4 mg total) by mouth every 6 (six)  hours as needed for nausea or vomiting, Starting Fri 7/19/2024, Normal      pantoprazole (PROTONIX) 40 mg tablet Take 1 tablet (40 mg total) by mouth daily, Starting Thu 2/15/2024, Until Mon 4/15/2024, Normal      polyethylene glycol (GLYCOLAX) 17 GM/SCOOP powder Take 17 g by mouth as needed (constipation) Take two doses of 17 g mixed with eight ounces of water each initially, then one dose per hour until you have a bowel movement., Starting Sun 2/11/2024, Print      Rytary 48. MG CPCR Historical Med      sucralfate (CARAFATE) 1 g tablet Take 1 tablet (1 g total) by mouth 3 (three) times a day, Starting Sun 2/11/2024, Print      triamcinolone (KENALOG) 0.1 % oral topical paste Apply 1 Application topically 2 (two) times a day, Starting Mon 7/22/2024, Normal       !! - Potential duplicate medications found. Please discuss with provider.        No discharge procedures on file.  ED SEPSIS DOCUMENTATION   Time reflects when diagnosis was documented in both MDM as applicable and the Disposition within this note       Time User Action Codes Description Comment    10/9/2024  9:13 PM Rosetta Eli Add [U07.1] COVID-19                  Rosetta Eli MD  10/09/24 3763

## 2024-10-10 LAB
ATRIAL RATE: 91 BPM
P AXIS: 64 DEGREES
PR INTERVAL: 182 MS
QRS AXIS: 53 DEGREES
QRSD INTERVAL: 132 MS
QT INTERVAL: 398 MS
QTC INTERVAL: 489 MS
T WAVE AXIS: 20 DEGREES
VENTRICULAR RATE: 91 BPM

## 2024-10-10 PROCEDURE — 93010 ELECTROCARDIOGRAM REPORT: CPT | Performed by: INTERNAL MEDICINE

## 2024-10-10 NOTE — DISCHARGE INSTRUCTIONS
You were seen and evaluated today for weakness, fever.  Your test results demonstrated COVID 19  Please take all medications as instructed. Follow up with your PCP as discussed.   RETURN TO THE EMERGENCY DEPARTMENT if you develop new or worsening symptoms and are unable to see your PCP.

## 2024-12-14 ENCOUNTER — APPOINTMENT (EMERGENCY)
Dept: CT IMAGING | Facility: HOSPITAL | Age: 89
DRG: 392 | End: 2024-12-14
Payer: COMMERCIAL

## 2024-12-14 ENCOUNTER — HOSPITAL ENCOUNTER (INPATIENT)
Facility: HOSPITAL | Age: 89
LOS: 2 days | Discharge: HOME | DRG: 392 | End: 2024-12-16
Admitting: STUDENT IN AN ORGANIZED HEALTH CARE EDUCATION/TRAINING PROGRAM
Payer: COMMERCIAL

## 2024-12-14 DIAGNOSIS — E78.2 MIXED HYPERLIPIDEMIA: ICD-10-CM

## 2024-12-14 DIAGNOSIS — K57.20 DIVERTICULITIS OF LARGE INTESTINE WITH ABSCESS: Primary | ICD-10-CM

## 2024-12-14 DIAGNOSIS — G20.A1 PARKINSON'S DISEASE, UNSPECIFIED WHETHER DYSKINESIA PRESENT, UNSPECIFIED WHETHER MANIFESTATIONS FLUCTUATE (HCC): ICD-10-CM

## 2024-12-14 DIAGNOSIS — G25.81 RESTLESS LEG SYNDROME: ICD-10-CM

## 2024-12-14 DIAGNOSIS — I10 ESSENTIAL HYPERTENSION: ICD-10-CM

## 2024-12-14 PROBLEM — K57.80 DIVERTICULITIS OF INTESTINE WITH ABSCESS: Status: ACTIVE | Noted: 2024-12-14

## 2024-12-14 PROBLEM — K21.9 GERD (GASTROESOPHAGEAL REFLUX DISEASE): Status: ACTIVE | Noted: 2024-12-14

## 2024-12-14 LAB
2HR DELTA HS TROPONIN: -1 NG/L
ALBUMIN SERPL BCG-MCNC: 5 G/DL (ref 3.5–5)
ALP SERPL-CCNC: 53 U/L (ref 34–104)
ALT SERPL W P-5'-P-CCNC: 5 U/L (ref 7–52)
ANION GAP SERPL CALCULATED.3IONS-SCNC: 6 MMOL/L (ref 4–13)
AST SERPL W P-5'-P-CCNC: 32 U/L (ref 13–39)
ATRIAL RATE: 76 BPM
BACTERIA UR QL AUTO: ABNORMAL /HPF
BASOPHILS # BLD AUTO: 0.02 THOUSANDS/ÂΜL (ref 0–0.1)
BASOPHILS NFR BLD AUTO: 0 % (ref 0–1)
BILIRUB SERPL-MCNC: 1.14 MG/DL (ref 0.2–1)
BILIRUB UR QL STRIP: NEGATIVE
BUN SERPL-MCNC: 19 MG/DL (ref 5–25)
CALCIUM SERPL-MCNC: 9.4 MG/DL (ref 8.4–10.2)
CARDIAC TROPONIN I PNL SERPL HS: 5 NG/L (ref ?–50)
CARDIAC TROPONIN I PNL SERPL HS: 6 NG/L (ref ?–50)
CHLORIDE SERPL-SCNC: 103 MMOL/L (ref 96–108)
CLARITY UR: CLEAR
CO2 SERPL-SCNC: 30 MMOL/L (ref 21–32)
COLOR UR: ABNORMAL
CREAT SERPL-MCNC: 0.87 MG/DL (ref 0.6–1.3)
EOSINOPHIL # BLD AUTO: 0.17 THOUSAND/ÂΜL (ref 0–0.61)
EOSINOPHIL NFR BLD AUTO: 2 % (ref 0–6)
ERYTHROCYTE [DISTWIDTH] IN BLOOD BY AUTOMATED COUNT: 12.4 % (ref 11.6–15.1)
GFR SERPL CREATININE-BSD FRML MDRD: 77 ML/MIN/1.73SQ M
GLUCOSE SERPL-MCNC: 99 MG/DL (ref 65–140)
GLUCOSE UR STRIP-MCNC: NEGATIVE MG/DL
HCT VFR BLD AUTO: 44.6 % (ref 36.5–49.3)
HGB BLD-MCNC: 14.8 G/DL (ref 12–17)
HGB UR QL STRIP.AUTO: NEGATIVE
HYALINE CASTS #/AREA URNS LPF: ABNORMAL /LPF
IMM GRANULOCYTES # BLD AUTO: 0.06 THOUSAND/UL (ref 0–0.2)
IMM GRANULOCYTES NFR BLD AUTO: 1 % (ref 0–2)
KETONES UR STRIP-MCNC: ABNORMAL MG/DL
LACTATE SERPL-SCNC: 1.2 MMOL/L (ref 0.5–2)
LEUKOCYTE ESTERASE UR QL STRIP: NEGATIVE
LIPASE SERPL-CCNC: 22 U/L (ref 11–82)
LYMPHOCYTES # BLD AUTO: 0.83 THOUSANDS/ÂΜL (ref 0.6–4.47)
LYMPHOCYTES NFR BLD AUTO: 8 % (ref 14–44)
MCH RBC QN AUTO: 32 PG (ref 26.8–34.3)
MCHC RBC AUTO-ENTMCNC: 33.2 G/DL (ref 31.4–37.4)
MCV RBC AUTO: 96 FL (ref 82–98)
MONOCYTES # BLD AUTO: 0.79 THOUSAND/ÂΜL (ref 0.17–1.22)
MONOCYTES NFR BLD AUTO: 8 % (ref 4–12)
MUCOUS THREADS UR QL AUTO: ABNORMAL
NEUTROPHILS # BLD AUTO: 8.52 THOUSANDS/ÂΜL (ref 1.85–7.62)
NEUTS SEG NFR BLD AUTO: 81 % (ref 43–75)
NITRITE UR QL STRIP: NEGATIVE
NON-SQ EPI CELLS URNS QL MICRO: ABNORMAL /HPF
NRBC BLD AUTO-RTO: 0 /100 WBCS
P AXIS: 54 DEGREES
PH UR STRIP.AUTO: 5.5 [PH]
PLATELET # BLD AUTO: 182 THOUSANDS/UL (ref 149–390)
PMV BLD AUTO: 10.6 FL (ref 8.9–12.7)
POTASSIUM SERPL-SCNC: 4.3 MMOL/L (ref 3.5–5.3)
PR INTERVAL: 182 MS
PROT SERPL-MCNC: 7.6 G/DL (ref 6.4–8.4)
PROT UR STRIP-MCNC: ABNORMAL MG/DL
QRS AXIS: 77 DEGREES
QRSD INTERVAL: 140 MS
QT INTERVAL: 430 MS
QTC INTERVAL: 483 MS
RBC # BLD AUTO: 4.63 MILLION/UL (ref 3.88–5.62)
RBC #/AREA URNS AUTO: ABNORMAL /HPF
SODIUM SERPL-SCNC: 139 MMOL/L (ref 135–147)
SP GR UR STRIP.AUTO: >=1.05 (ref 1–1.03)
T WAVE AXIS: 1 DEGREES
UROBILINOGEN UR STRIP-ACNC: <2 MG/DL
VENTRICULAR RATE: 76 BPM
WBC # BLD AUTO: 10.39 THOUSAND/UL (ref 4.31–10.16)
WBC #/AREA URNS AUTO: ABNORMAL /HPF

## 2024-12-14 PROCEDURE — 99223 1ST HOSP IP/OBS HIGH 75: CPT | Performed by: STUDENT IN AN ORGANIZED HEALTH CARE EDUCATION/TRAINING PROGRAM

## 2024-12-14 PROCEDURE — 84484 ASSAY OF TROPONIN QUANT: CPT

## 2024-12-14 PROCEDURE — 74177 CT ABD & PELVIS W/CONTRAST: CPT

## 2024-12-14 PROCEDURE — 80053 COMPREHEN METABOLIC PANEL: CPT

## 2024-12-14 PROCEDURE — 93010 ELECTROCARDIOGRAM REPORT: CPT | Performed by: INTERNAL MEDICINE

## 2024-12-14 PROCEDURE — 85025 COMPLETE CBC W/AUTO DIFF WBC: CPT

## 2024-12-14 PROCEDURE — 83690 ASSAY OF LIPASE: CPT

## 2024-12-14 PROCEDURE — 83605 ASSAY OF LACTIC ACID: CPT

## 2024-12-14 PROCEDURE — 99223 1ST HOSP IP/OBS HIGH 75: CPT | Performed by: INTERNAL MEDICINE

## 2024-12-14 PROCEDURE — 99284 EMERGENCY DEPT VISIT MOD MDM: CPT

## 2024-12-14 PROCEDURE — 36415 COLL VENOUS BLD VENIPUNCTURE: CPT

## 2024-12-14 PROCEDURE — 81001 URINALYSIS AUTO W/SCOPE: CPT

## 2024-12-14 PROCEDURE — 93005 ELECTROCARDIOGRAM TRACING: CPT

## 2024-12-14 PROCEDURE — 99285 EMERGENCY DEPT VISIT HI MDM: CPT

## 2024-12-14 RX ORDER — LOSARTAN POTASSIUM 25 MG/1
25 TABLET ORAL ONCE
Status: COMPLETED | OUTPATIENT
Start: 2024-12-14 | End: 2024-12-14

## 2024-12-14 RX ORDER — FAMOTIDINE 20 MG/1
40 TABLET, FILM COATED ORAL
Status: DISCONTINUED | OUTPATIENT
Start: 2024-12-14 | End: 2024-12-16 | Stop reason: HOSPADM

## 2024-12-14 RX ORDER — TRAMADOL HYDROCHLORIDE 50 MG/1
50 TABLET ORAL EVERY 6 HOURS PRN
Status: DISCONTINUED | OUTPATIENT
Start: 2024-12-14 | End: 2024-12-16 | Stop reason: HOSPADM

## 2024-12-14 RX ORDER — SODIUM CHLORIDE 9 MG/ML
75 INJECTION, SOLUTION INTRAVENOUS CONTINUOUS
Status: DISCONTINUED | OUTPATIENT
Start: 2024-12-14 | End: 2024-12-16 | Stop reason: HOSPADM

## 2024-12-14 RX ORDER — METRONIDAZOLE 500 MG/100ML
500 INJECTION, SOLUTION INTRAVENOUS EVERY 8 HOURS
Status: DISCONTINUED | OUTPATIENT
Start: 2024-12-14 | End: 2024-12-14

## 2024-12-14 RX ORDER — HEPARIN SODIUM 5000 [USP'U]/ML
5000 INJECTION, SOLUTION INTRAVENOUS; SUBCUTANEOUS EVERY 8 HOURS SCHEDULED
Status: DISCONTINUED | OUTPATIENT
Start: 2024-12-14 | End: 2024-12-16 | Stop reason: HOSPADM

## 2024-12-14 RX ORDER — ACETAMINOPHEN 325 MG/1
650 TABLET ORAL EVERY 6 HOURS PRN
Status: DISCONTINUED | OUTPATIENT
Start: 2024-12-14 | End: 2024-12-16 | Stop reason: HOSPADM

## 2024-12-14 RX ORDER — LOSARTAN POTASSIUM 25 MG/1
25 TABLET ORAL DAILY
Status: DISCONTINUED | OUTPATIENT
Start: 2024-12-15 | End: 2024-12-16 | Stop reason: HOSPADM

## 2024-12-14 RX ORDER — CARBIDOPA AND LEVODOPA 50; 200 MG/1; MG/1
1 TABLET, EXTENDED RELEASE ORAL 3 TIMES DAILY
Status: DISCONTINUED | OUTPATIENT
Start: 2024-12-14 | End: 2024-12-14

## 2024-12-14 RX ORDER — OXYCODONE HYDROCHLORIDE 5 MG/1
5 TABLET ORAL EVERY 6 HOURS PRN
Refills: 0 | Status: DISCONTINUED | OUTPATIENT
Start: 2024-12-14 | End: 2024-12-16 | Stop reason: HOSPADM

## 2024-12-14 RX ORDER — METRONIDAZOLE 500 MG/100ML
500 INJECTION, SOLUTION INTRAVENOUS EVERY 8 HOURS
Status: DISCONTINUED | OUTPATIENT
Start: 2024-12-14 | End: 2024-12-16 | Stop reason: HOSPADM

## 2024-12-14 RX ADMIN — METRONIDAZOLE 500 MG: 500 INJECTION, SOLUTION INTRAVENOUS at 10:40

## 2024-12-14 RX ADMIN — LOSARTAN POTASSIUM 25 MG: 25 TABLET, FILM COATED ORAL at 08:35

## 2024-12-14 RX ADMIN — SODIUM CHLORIDE 100 ML/HR: 0.9 INJECTION, SOLUTION INTRAVENOUS at 14:56

## 2024-12-14 RX ADMIN — METRONIDAZOLE 500 MG: 500 INJECTION, SOLUTION INTRAVENOUS at 18:03

## 2024-12-14 RX ADMIN — FAMOTIDINE 40 MG: 20 TABLET, FILM COATED ORAL at 21:05

## 2024-12-14 RX ADMIN — HEPARIN SODIUM 5000 UNITS: 5000 INJECTION INTRAVENOUS; SUBCUTANEOUS at 13:33

## 2024-12-14 RX ADMIN — IOHEXOL 100 ML: 350 INJECTION, SOLUTION INTRAVENOUS at 09:24

## 2024-12-14 RX ADMIN — CEFTRIAXONE SODIUM 1000 MG: 10 INJECTION, POWDER, FOR SOLUTION INTRAVENOUS at 11:13

## 2024-12-14 RX ADMIN — HEPARIN SODIUM 5000 UNITS: 5000 INJECTION INTRAVENOUS; SUBCUTANEOUS at 21:05

## 2024-12-14 RX ADMIN — LEVODOPA AND CARBIDOPA 3 CAPSULE: 195; 48.75 CAPSULE, EXTENDED RELEASE ORAL at 21:06

## 2024-12-14 NOTE — PLAN OF CARE
Problem: Potential for Falls  Goal: Patient will remain free of falls  Description: INTERVENTIONS:  - Educate patient/family on patient safety including physical limitations  - Instruct patient to call for assistance with activity   - Consult OT/PT to assist with strengthening/mobility   - Keep Call bell within reach  - Keep bed low and locked with side rails adjusted as appropriate  - Keep care items and personal belongings within reach  - Initiate and maintain comfort rounds  - Make Fall Risk Sign visible to staff  - Offer Toileting every 2 Hours, in advance of need  - Initiate/Maintain alarm  - Obtain necessary fall risk management equipment:   - Apply yellow socks and bracelet for high fall risk patients  - Consider moving patient to room near nurses station  Outcome: Progressing      patient

## 2024-12-14 NOTE — H&P
H&P Exam - General Surgery   Momo Swann 89 y.o. male MRN: [id number]  Unit/Bed#: -01 Encounter: [id number]    Assessment & Plan     Momo Swann is a 89 y.o. male    Diverticulitis of intestine with abscess  -Patient presents with 2 days of lower abdominal pain  -He woke up this morning and the pain had increased so he presented to the emergency department  -Denies ever having diverticulitis before  -Abdomen soft, nondistended, tenderness to palpation in the left lower quadrant  -CT scan of the abdomen and pelvis report and images reviewed  -White blood cell count noted to be 10.3 with 81% shift  -N.p.o., IV fluid, IV antibiotics  -Nonoperative management at this time  -SLIM consult for medical management    History of Present Illness   HPI:  Momo Swann is a 89 y.o. male who presents with abdominal pain.  Patient notes abdominal pain for about 2 days.  He has never had this time of pain before.  He denies ever having diverticulitis.  He notes the pain is on the left lower side of his abdomen.    Review of Systems  Constitutional: Denies weight change, fever, chills, night sweats, fatigue  HEENT: Denies headaches, hearing change, vision change, nasal congestion, sore throat  Cardiovascular: Denies chest pain, shortness of breath, dyspnea on exertion  Respiratory: Denies cough, dyspnea  Gastrointestinal: Denies nausea, vomiting; Positive bowel movement, flatus, abdominal pain  Genitourinary: Denies dysuria, hematuria  Musculoskeletal: Denies arthralgias, myalgias  Neuro: Denies weakness, numbness, loss of consciousness  Heme: Denies easy bruising, bleeding  Endocrine: Denies polyuria, polydipsia    Historical Information   Past Medical History:   Diagnosis Date    Arthritis     Bladder stones     Hypertension     Parkinson disease (HCC)      Past Surgical History:   Procedure Laterality Date    COLONOSCOPY      FOOT SURGERY      ID CYSTOLITHOTOMY CYSTOTOMY W/RMVL CALCULUS N/A 8/5/2021    Procedure:  CYSTOLITHOTOMY OPEN;  Surgeon: Adiel Niño MD;  Location: [facility];  Service: Urology    OR TRURL ELECTROSURG RESCJ PROSTATE BLEED COMPLETE N/A 8/5/2021    Procedure: TRANSURETHRAL RESECTION OF PROSTATE (TURP);  Surgeon: Adiel Niño MD;  Location: [facility];  Service: Urology    TONSILLECTOMY       Social History   Social History     Substance and Sexual Activity   Alcohol Use Never     Social History     Substance and Sexual Activity   Drug Use Never     Social History     Tobacco Use   Smoking Status Never   Smokeless Tobacco Never     Family History: Family history non-contributory    Meds/Allergies   all medications and allergies reviewed  No Known Allergies    Objective   First Vitals:   Blood Pressure: 161/82 (12/14/24 0756)  Pulse: 81 (12/14/24 0756)  Temperature: 97.7 °F (36.5 °C) (12/14/24 0758)  Temp Source: Oral (12/14/24 0758)  Respirations: 16 (12/14/24 0756)  SpO2: 92 % (12/14/24 0756)    Current Vitals:   Blood Pressure: 141/74 (12/14/24 1207)  Pulse: 81 (12/14/24 1207)  Temperature: 98.3 °F (36.8 °C) (12/14/24 1207)  Temp Source: Oral (12/14/24 1207)  Respirations: 17 (12/14/24 1207)  SpO2: 91 % (12/14/24 1207)      Intake/Output Summary (Last 24 hours) at 12/14/2024 1313  Last data filed at 12/14/2024 1113  Gross per 24 hour   Intake 100 ml   Output --   Net 100 ml       Invasive Devices       Peripheral Intravenous Line  Duration             Peripheral IV 12/14/24 Right Antecubital <1 day                    Physical Exam  Constitutional:       Appearance: Normal appearance.   HENT:      Head: Normocephalic and atraumatic.      Nose: Nose normal.   Eyes:      General: No scleral icterus.     Conjunctiva/sclera: Conjunctivae normal.   Cardiovascular:      Rate and Rhythm: Normal rate  Pulmonary:      Effort: Pulmonary effort is normal.   Abdominal:      General: There is no distension.      Palpations: Abdomen is soft.      Tenderness: Tenderness to palpation in the left lower  quadrant  Musculoskeletal:         General: No signs of injury.   Skin:     General: Skin is warm.      Coloration: Skin is not jaundiced.   Neurological:      General: No focal deficit present.      Mental Status: Patient is alert and oriented to person, place, and time.   Psychiatric:         Mood and Affect: Mood normal.         Behavior: Behavior normal.    Lab Results: I have personally reviewed pertinent lab results.    Recent Results (from the past 36 hours)   CBC and differential    Collection Time: 12/14/24  8:26 AM   Result Value Ref Range    WBC 10.39 (H) 4.31 - 10.16 Thousand/uL    RBC 4.63 3.88 - 5.62 Million/uL    Hemoglobin 14.8 12.0 - 17.0 g/dL    Hematocrit 44.6 36.5 - 49.3 %    MCV 96 82 - 98 fL    MCH 32.0 26.8 - 34.3 pg    MCHC 33.2 31.4 - 37.4 g/dL    RDW 12.4 11.6 - 15.1 %    MPV 10.6 8.9 - 12.7 fL    Platelets 182 149 - 390 Thousands/uL    nRBC 0 /100 WBCs    Segmented % 81 (H) 43 - 75 %    Immature Grans % 1 0 - 2 %    Lymphocytes % 8 (L) 14 - 44 %    Monocytes % 8 4 - 12 %    Eosinophils Relative 2 0 - 6 %    Basophils Relative 0 0 - 1 %    Absolute Neutrophils 8.52 (H) 1.85 - 7.62 Thousands/µL    Absolute Immature Grans 0.06 0.00 - 0.20 Thousand/uL    Absolute Lymphocytes 0.83 0.60 - 4.47 Thousands/µL    Absolute Monocytes 0.79 0.17 - 1.22 Thousand/µL    Eosinophils Absolute 0.17 0.00 - 0.61 Thousand/µL    Basophils Absolute 0.02 0.00 - 0.10 Thousands/µL   Comprehensive metabolic panel    Collection Time: 12/14/24  8:26 AM   Result Value Ref Range    Sodium 139 135 - 147 mmol/L    Potassium 4.3 3.5 - 5.3 mmol/L    Chloride 103 96 - 108 mmol/L    CO2 30 21 - 32 mmol/L    ANION GAP 6 4 - 13 mmol/L    BUN 19 5 - 25 mg/dL    Creatinine 0.87 0.60 - 1.30 mg/dL    Glucose 99 65 - 140 mg/dL    Calcium 9.4 8.4 - 10.2 mg/dL    AST 32 13 - 39 U/L    ALT 5 (L) 7 - 52 U/L    Alkaline Phosphatase 53 34 - 104 U/L    Total Protein 7.6 6.4 - 8.4 g/dL    Albumin 5.0 3.5 - 5.0 g/dL    Total Bilirubin 1.14 (H)  "0.20 - 1.00 mg/dL    eGFR 77 ml/min/1.73sq m   Lipase    Collection Time: 12/14/24  8:26 AM   Result Value Ref Range    Lipase 22 11 - 82 u/L   HS Troponin 0hr (reflex protocol)    Collection Time: 12/14/24  8:26 AM   Result Value Ref Range    hs TnI 0hr 6 \"Refer to ACS Flowchart\"- see link ng/L   ECG 12 lead    Collection Time: 12/14/24  8:34 AM   Result Value Ref Range    Ventricular Rate 76 BPM    Atrial Rate 76 BPM    MO Interval 182 ms    QRSD Interval 140 ms    QT Interval 430 ms    QTC Interval 483 ms    P Success 54 degrees    QRS Axis 77 degrees    T Wave Axis 1 degrees   Lactic acid, plasma (w/reflex if result > 2.0)    Collection Time: 12/14/24  8:45 AM   Result Value Ref Range    LACTIC ACID 1.2 0.5 - 2.0 mmol/L   UA w Reflex to Microscopic w Reflex to Culture    Collection Time: 12/14/24  9:50 AM    Specimen: Urine, Clean Catch   Result Value Ref Range    Color, UA Light Yellow     Clarity, UA Clear     Specific Gravity, UA >=1.050 (H) 1.003 - 1.030    pH, UA 5.5 4.5, 5.0, 5.5, 6.0, 6.5, 7.0, 7.5, 8.0    Leukocytes, UA Negative Negative    Nitrite, UA Negative Negative    Protein, UA Trace (A) Negative mg/dl    Glucose, UA Negative Negative mg/dl    Ketones, UA Trace (A) Negative mg/dl    Urobilinogen, UA <2.0 <2.0 mg/dl mg/dl    Bilirubin, UA Negative Negative    Occult Blood, UA Negative Negative   Urine Microscopic    Collection Time: 12/14/24  9:50 AM   Result Value Ref Range    RBC, UA None Seen None Seen, 1-2 /hpf    WBC, UA 1-2 None Seen, 1-2 /hpf    Epithelial Cells Occasional None Seen, Occasional /hpf    Bacteria, UA None Seen None Seen, Occasional /hpf    MUCUS THREADS Occasional (A) None Seen    Hyaline Casts, UA 0-3 (A) None Seen /lpf   HS Troponin I 2hr    Collection Time: 12/14/24 10:40 AM   Result Value Ref Range    hs TnI 2hr 5 \"Refer to ACS Flowchart\"- see link ng/L    Delta 2hr hsTnI -1 <20 ng/L     Imaging: Results Review Statement: I personally reviewed the following image studies in " PACS and associated radiology reports: CT abdomen/pelvis. My interpretation of the radiology images/reports is: Sigmoid diverticulitis with intramural abscess.  CT abdomen pelvis with contrast  Result Date: 12/14/2024  Impression: Findings consistent with acute diverticulitis at the junction of the distal descending and sigmoid colon complicated by a small 1.7 x 1.1 cm intramural abscess. No evidence for macroperforation. Cholelithiasis; no pericholecystic inflammation. Mild hepatomegaly/hepatic steatosis. Spleen similarly top normal in size. The study was marked in EPIC for immediate notification. Workstation performed: [id number]

## 2024-12-14 NOTE — ASSESSMENT & PLAN NOTE
Presented with abdominal pain found to have diverticulitis with abscess formation  Being managed by general surgery as primary  Continue with IV antibiotics  Bowel rest  IV fluids  Rest of plan per general surgery team

## 2024-12-14 NOTE — ED PROVIDER NOTES
Time reflects when diagnosis was documented in both MDM as applicable and the Disposition within this note       Time User Action Codes Description Comment    12/14/2024 10:14 AM Maria De JesusPenny Tiffanie [K57.20] Diverticulitis of large intestine with abscess     12/14/2024 10:33 AM Adiel Nunez [I10] Essential hypertension     12/14/2024 10:33 AM Adiel Nunez [E78.2] Mixed hyperlipidemia     12/14/2024 10:33 AM Adiel Nunez [G25.81] Restless leg syndrome     12/14/2024 10:33 AM Adiel Nunez [G20.A1] Parkinson's disease, unspecified whether dyskinesia present, unspecified whether manifestations fluctuate ([facility])           ED Disposition       ED Disposition   Admit    Condition   Stable    Date/Time   Sat Dec 14, 2024 10:13 AM    Comment   Case was discussed with Adiel Nunez and the patient's admission status was agreed to be Admission Status: inpatient status to the service of Dr. Nunez .               Assessment & Plan       Medical Decision Making  Patient is a 89 y/o M who presents complaining of LLQ and L groin pain onset 3 days ago. Patient states pain was initially intermittent but today the symptoms worsened and became constant. Patient reports bright red urine onset today. Patients wife states the sometimes the carbidopa-levodopa can cause pinkish urine as a SE but that his urine has never been this red before. Also reports increased frequency over the last 3 weeks. Admits to chills but states this can be normal with his hx of parkinson's. Patient denies dysuria, increased odor of urine, fever, N/V/D, constipation, hematochezia, flank pain, back pain, recent illnesses, chest pain, shortness of breath, or other symptoms at this time.     Afebrile. VSS. NAD. Well-appearing. Heart RR&R. Lungs clear. Abd is soft. Generalized abd tenderness that is worse in the lower abd. Tenderness to palpation of L groin. No CVA tenderness. No rebound or guarding. No mass.     Ordered CBC, CMP, lipase,  lactic acid, trop, EKG, UA, and CT abd/pelvis w/ contrast. CT shows diverticulitis w/ abscess formation. Mild leukoctyosis. No anemia. CMP, lipase, and lactic is WNL. No evidence of UTI on UA. EKG is NSR at 76 bpm with RBBB and premature supraventricular contraction similar to prior on 10/09/24. Started on IV rocephin and flagyl. Consulted surgery who accepted the patient under his service. Patient NPO. Case discussed with Julie Gutzweiler, PA-C and Dr. Preston. Discussed assessment and plan with patient and his wife who verbalize understanding and agree with plan.    Amount and/or Complexity of Data Reviewed  Labs: ordered. Decision-making details documented in ED Course.  Radiology: ordered. Decision-making details documented in ED Course.  ECG/medicine tests:  Decision-making details documented in ED Course.    Risk  Prescription drug management.  Decision regarding hospitalization.        ED Course as of 12/14/24 1515   Sat Dec 14, 2024   0935 LACTIC ACID: 1.2   0935 LIPASE: 22   0935 hs TnI 0hr: 6   0935 Blood Pressure: 161/82  Didn't take his HTN medication this morning. Given here.   0935 Temperature: 97.7 °F (36.5 °C)   0935 Pulse: 81   0935 Respirations: 16   0935 SpO2: 92 %   0935 WBC(!): 10.39   0935 RBC: 4.63   0935 Hemoglobin: 14.8   0935 Hematocrit: 44.6   0936 CBC and differential(!)  No anemia. Mild leukocytosis.    0936 Comprehensive metabolic panel(!)  No electrolyte abnormalities. Normal kidney function and liver enzymes.    0936 CT abdomen pelvis with contrast   0940 ECG 12 lead  Sinus rhythm at 76 bpm with RBBB and premature supraventricular contractions. Compared to ECG on 10/09/2024 the PVCs are new but otherwise similar.   0953 CT abdomen pelvis with contrast  Diverticulitis w/ abscess. No evidence of obstruction. Gallstones w/o signs of cholecystitis. No perforation.   1016 Consulted Dr. Nunez, general surgery, who accepted the patient under his service   1035 UA w Reflex to Microscopic w  Reflex to Culture(!)  No sign of UTI.   1035 Bacteria, UA: None Seen   1036 Will start IV rocephin and flagyl. Pt NPO.   1514 Heart Score Risk Calculator  History 0  ECG 0  Age 2  Risk Factors 1  Troponin 0  HEART Score 3   1515 Delta 2hr hsTnI: -1       Medications   losartan (COZAAR) tablet 25 mg (25 mg Oral Given 12/14/24 0835)   iohexol (OMNIPAQUE) 350 MG/ML injection (MULTI-DOSE) 100 mL (100 mL Intravenous Given 12/14/24 0924)   ceftriaxone (ROCEPHIN) 1 g/50 mL in dextrose IVPB (1,000 mg Intravenous New Bag 12/14/24 1113)       ED Risk Strat Scores   HEART Risk Score      Flowsheet Row Most Recent Value   Heart Score Risk Calculator    History 0 Filed at: 12/14/2024 1513   ECG 0 Filed at: 12/14/2024 1513   Age 2 Filed at: 12/14/2024 1513   Risk Factors 1 Filed at: 12/14/2024 1513   Troponin 0 Filed at: 12/14/2024 1513   HEART Score 3 Filed at: 12/14/2024 1513          HEART Risk Score      Flowsheet Row Most Recent Value   Heart Score Risk Calculator    History 0 Filed at: 12/14/2024 1513   ECG 0 Filed at: 12/14/2024 1513   Age 2 Filed at: 12/14/2024 1513   Risk Factors 1 Filed at: 12/14/2024 1513   Troponin 0 Filed at: 12/14/2024 1513   HEART Score 3 Filed at: 12/14/2024 1513                            SBIRT 22yo+      Flowsheet Row Most Recent Value   Initial Alcohol Screen: US AUDIT-C     1. How often do you have a drink containing alcohol? 0 Filed at: 12/14/2024 0821   2. How many drinks containing alcohol do you have on a typical day you are drinking?  0 Filed at: 12/14/2024 0821   3b. FEMALE Any Age, or MALE 65+: How often do you have 4 or more drinks on one occassion? 0 Filed at: 12/14/2024 0821   Audit-C Score 0 Filed at: 12/14/2024 0821   TIFF: How many times in the past year have you...    Used an illegal drug or used a prescription medication for non-medical reasons? Never Filed at: 12/14/2024 0862                            History of Present Illness       Chief Complaint   Patient presents with     Abdominal Pain     Patient c.o LLQ pain x several days, worse this morning. Possible blood in urine. Denies n/v/d       Past Medical History:   Diagnosis Date    Arthritis     Bladder stones     Hypertension     Parkinson disease (HCC)       Past Surgical History:   Procedure Laterality Date    COLONOSCOPY      FOOT SURGERY      MT CYSTOLITHOTOMY CYSTOTOMY W/RMVL CALCULUS N/A 8/5/2021    Procedure: CYSTOLITHOTOMY OPEN;  Surgeon: Adiel Niño MD;  Location: [facility];  Service: Urology    MT TRURL ELECTROSURG RESCJ PROSTATE BLEED COMPLETE N/A 8/5/2021    Procedure: TRANSURETHRAL RESECTION OF PROSTATE (TURP);  Surgeon: Adiel Niño MD;  Location: [facility];  Service: Urology    TONSILLECTOMY        Family History   Problem Relation Age of Onset    No Known Problems Mother     Lung cancer Father     No Known Problems Sister     No Known Problems Sister     Lung cancer Sister       Social History     Tobacco Use    Smoking status: Never    Smokeless tobacco: Never   Vaping Use    Vaping status: Never Used   Substance Use Topics    Alcohol use: Never    Drug use: Never      E-Cigarette/Vaping    E-Cigarette Use Never User       E-Cigarette/Vaping Substances    Nicotine No     THC No     CBD No     Flavoring No     Other No     Unknown No       I have reviewed and agree with the history as documented.     Patient is a 88 y/o M who presents complaining of LLQ and L groin pain onset 3 days ago. Patient states pain was initially intermittent but today the symptoms worsened and became constant. Patient reports bright red urine onset today. Patients wife states the sometimes the carbidopa-levodopa can cause pinkish urine as a SE but that his urine has never been this red before. Also reports increased frequency over the last 3 weeks. Admits to chills but states this can be normal with his hx of parkinson's. Patient denies dysuria, increased odor of urine, fever, N/V/D, constipation, hematochezia, flank pain, back  pain, recent illnesses, chest pain, shortness of breath, or other symptoms at this time.       History provided by:  Patient and spouse   used: No    Abdominal Pain  Pain location:  LLQ (and L groin)  Pain quality: sharp    Pain radiates to:  Does not radiate  Pain severity:  Moderate  Onset quality:  Gradual  Duration:  3 days  Timing:  Constant  Progression:  Worsening  Chronicity:  New  Context: not previous surgeries and not recent illness    Relieved by:  NSAIDs  Worsened by:  Nothing  Ineffective treatments:  Acetaminophen  Associated symptoms: chills and hematuria    Associated symptoms: no chest pain, no constipation, no diarrhea, no dysuria, no fatigue, no fever, no hematemesis, no hematochezia, no nausea, no shortness of breath and no vomiting    Risk factors: being elderly        Review of Systems   Constitutional:  Positive for chills. Negative for appetite change, fatigue and fever.   HENT: Negative.     Eyes: Negative.    Respiratory: Negative.  Negative for shortness of breath.    Cardiovascular:  Negative for chest pain.   Gastrointestinal:  Positive for abdominal pain. Negative for constipation, diarrhea, hematemesis, hematochezia, nausea and vomiting.   Genitourinary:  Positive for frequency and hematuria. Negative for decreased urine volume, dysuria, flank pain, penile pain and testicular pain.   Musculoskeletal: Negative.  Negative for back pain.   Skin: Negative.  Negative for color change and rash.   Neurological: Negative.    All other systems reviewed and are negative.          Objective       ED Triage Vitals   Temperature Pulse Blood Pressure Respirations SpO2 Patient Position - Orthostatic VS   12/14/24 0758 12/14/24 0756 12/14/24 0756 12/14/24 0756 12/14/24 0756 12/14/24 1207   97.7 °F (36.5 °C) 81 161/82 16 92 % Sitting      Temp Source Heart Rate Source BP Location FiO2 (%) Pain Score    12/14/24 0758 -- 12/14/24 1207 -- 12/14/24 1207    Oral  Left arm  No Pain       Vitals      Date and Time Temp Pulse SpO2 Resp BP Pain Score FACES Pain Rating User   12/14/24 1500 -- 85 -- -- 141/75 -- -- KG   12/14/24 1207 -- -- -- -- -- No Pain -- PB   12/14/24 1207 98.3 °F (36.8 °C) 81 91 % 17 141/74 -- -- ZP   12/14/24 0758 97.7 °F (36.5 °C) -- -- -- -- -- -- JV   12/14/24 0756 -- 81 92 % 16 161/82 -- -- JV            Physical Exam  Vitals and nursing note reviewed.   Constitutional:       General: He is awake. He is not in acute distress.     Appearance: Normal appearance. He is well-developed and well-groomed. He is not ill-appearing, toxic-appearing or diaphoretic.      Comments: Patient is resting comfortably on bed, in no  acute distress. Pleasant and cooperative. Wife at bedside.    HENT:      Head: Normocephalic and atraumatic.      Right Ear: External ear normal.      Left Ear: External ear normal.      Nose: Nose normal.      Mouth/Throat:      Lips: Pink.   Eyes:      General: Lids are normal.      Extraocular Movements: Extraocular movements intact.      Conjunctiva/sclera: Conjunctivae normal.   Cardiovascular:      Rate and Rhythm: Normal rate and regular rhythm.      Heart sounds: Normal heart sounds. No murmur heard.  Pulmonary:      Effort: Pulmonary effort is normal. No tachypnea or respiratory distress.      Breath sounds: Normal breath sounds and air entry. No stridor. No decreased breath sounds, wheezing, rhonchi or rales.   Abdominal:      General: Bowel sounds are normal. There is no distension.      Palpations: Abdomen is soft. There is no mass.      Tenderness: There is generalized abdominal tenderness and tenderness in the right lower quadrant, suprapubic area and left lower quadrant. There is no right CVA tenderness, left CVA tenderness, guarding or rebound.      Comments: L groin pain to palpation.   Musculoskeletal:         General: No swelling. Normal range of motion.      Cervical back: Normal range of motion.      Right lower leg: No edema.      Left lower  leg: No edema.   Skin:     General: Skin is warm and dry.   Neurological:      Mental Status: He is alert and oriented to person, place, and time.      GCS: GCS eye subscore is 4. GCS verbal subscore is 5. GCS motor subscore is 6.   Psychiatric:         Attention and Perception: Attention normal.         Mood and Affect: Mood normal.         Speech: Speech normal.         Behavior: Behavior normal. Behavior is cooperative.         Results Reviewed       Procedure Component Value Units Date/Time    HS Troponin I 2hr [[id number]]  (Normal) Collected: 12/14/24 1040    Lab Status: Final result Specimen: Blood from Arm, Right Updated: 12/14/24 1110     hs TnI 2hr 5 ng/L      Delta 2hr hsTnI -1 ng/L     HS Troponin I 4hr [[id number]]     Lab Status: No result Specimen: Blood     Urine Microscopic [[id number]]  (Abnormal) Collected: 12/14/24 0950    Lab Status: Final result Specimen: Urine, Clean Catch Updated: 12/14/24 1023     RBC, UA None Seen /hpf      WBC, UA 1-2 /hpf      Epithelial Cells Occasional /hpf      Bacteria, UA None Seen /hpf      MUCUS THREADS Occasional     Hyaline Casts, UA 0-3 /lpf     UA w Reflex to Microscopic w Reflex to Culture [[id number]]  (Abnormal) Collected: 12/14/24 0950    Lab Status: Final result Specimen: Urine, Clean Catch Updated: 12/14/24 1014     Color, UA Light Yellow     Clarity, UA Clear     Specific Gravity, UA >=1.050     pH, UA 5.5     Leukocytes, UA Negative     Nitrite, UA Negative     Protein, UA Trace mg/dl      Glucose, UA Negative mg/dl      Ketones, UA Trace mg/dl      Urobilinogen, UA <2.0 mg/dl      Bilirubin, UA Negative     Occult Blood, UA Negative    Lactic acid, plasma (w/reflex if result > 2.0) [[id number]]  (Normal) Collected: 12/14/24 0845    Lab Status: Final result Specimen: Blood from Arm, Right Updated: 12/14/24 0914     LACTIC ACID 1.2 mmol/L     Narrative:      Result may be elevated if tourniquet was used during collection.    Comprehensive metabolic panel  [[id number]]  (Abnormal) Collected: 12/14/24 0826    Lab Status: Final result Specimen: Blood from Arm, Right Updated: 12/14/24 0907     Sodium 139 mmol/L      Potassium 4.3 mmol/L      Chloride 103 mmol/L      CO2 30 mmol/L      ANION GAP 6 mmol/L      BUN 19 mg/dL      Creatinine 0.87 mg/dL      Glucose 99 mg/dL      Calcium 9.4 mg/dL      AST 32 U/L      ALT 5 U/L      Alkaline Phosphatase 53 U/L      Total Protein 7.6 g/dL      Albumin 5.0 g/dL      Total Bilirubin 1.14 mg/dL      eGFR 77 ml/min/1.73sq m     Narrative:      [employer] guidelines for Chronic Kidney Disease (CKD):     Stage 1 with normal or high GFR (GFR > 90 mL/min/1.73 square meters)    Stage 2 Mild CKD (GFR = 60-89 mL/min/1.73 square meters)    Stage 3A Moderate CKD (GFR = 45-59 mL/min/1.73 square meters)    Stage 3B Moderate CKD (GFR = 30-44 mL/min/1.73 square meters)    Stage 4 Severe CKD (GFR = 15-29 mL/min/1.73 square meters)    Stage 5 End Stage CKD (GFR <15 mL/min/1.73 square meters)  Note: GFR calculation is accurate only with a steady state creatinine    Lipase [[id number]]  (Normal) Collected: 12/14/24 0826    Lab Status: Final result Specimen: Blood from Arm, Right Updated: 12/14/24 0907     Lipase 22 u/L     HS Troponin 0hr (reflex protocol) [[id number]]  (Normal) Collected: 12/14/24 0826    Lab Status: Final result Specimen: Blood from Arm, Right Updated: 12/14/24 0900     hs TnI 0hr 6 ng/L     CBC and differential [[id number]]  (Abnormal) Collected: 12/14/24 0826    Lab Status: Final result Specimen: Blood from Arm, Right Updated: 12/14/24 0837     WBC 10.39 Thousand/uL      RBC 4.63 Million/uL      Hemoglobin 14.8 g/dL      Hematocrit 44.6 %      MCV 96 fL      MCH 32.0 pg      MCHC 33.2 g/dL      RDW 12.4 %      MPV 10.6 fL      Platelets 182 Thousands/uL      nRBC 0 /100 WBCs      Segmented % 81 %      Immature Grans % 1 %      Lymphocytes % 8 %      Monocytes % 8 %      Eosinophils Relative 2 %       Basophils Relative 0 %      Absolute Neutrophils 8.52 Thousands/µL      Absolute Immature Grans 0.06 Thousand/uL      Absolute Lymphocytes 0.83 Thousands/µL      Absolute Monocytes 0.79 Thousand/µL      Eosinophils Absolute 0.17 Thousand/µL      Basophils Absolute 0.02 Thousands/µL             CT abdomen pelvis with contrast   Final Interpretation by Jb Sky MD (12/14 0948)      Findings consistent with acute diverticulitis at the junction of the distal descending and sigmoid colon complicated by a small 1.7 x 1.1 cm intramural abscess. No evidence for macroperforation.      Cholelithiasis; no pericholecystic inflammation.      Mild hepatomegaly/hepatic steatosis.      Spleen similarly top normal in size.      The study was marked in EPIC for immediate notification.               Workstation performed: [id number]             Procedures    ED Medication and Procedure Management   Prior to Admission Medications   Prescriptions Last Dose Informant Patient Reported? Taking?   Multiple Vitamins-Minerals (CENTRUM SILVER PO) 12/13/2024 Self Yes Yes   Sig: Take by mouth daily   Rytary 48. MG CPCR 12/14/2024 at  5:00 AM Self Yes Yes   docusate sodium (COLACE) 100 mg capsule Not Taking Self No No   Sig: Take 1 capsule (100 mg total) by mouth 2 (two) times a day   Patient not taking: Reported on 12/14/2024   famotidine (PEPCID) 40 MG tablet 12/14/2024  No Yes   Sig: Take 1 tablet (40 mg total) by mouth daily at bedtime   losartan (COZAAR) 25 mg tablet 12/14/2024 Morning Self Yes Yes   Sig: Take 25 mg by mouth daily   meloxicam (MOBIC) 15 mg tablet 12/14/2024 Self Yes Yes   ondansetron (ZOFRAN) 4 mg tablet Not Taking  No No   Sig: Take 1 tablet (4 mg total) by mouth every 8 (eight) hours as needed for nausea or vomiting   Patient not taking: Reported on 12/14/2024   ondansetron (ZOFRAN-ODT) 4 mg disintegrating tablet Not Taking Self No No   Sig: Take 1 tablet (4 mg total) by mouth every 6 (six) hours as needed for  nausea or vomiting   Patient not taking: Reported on 12/14/2024   ondansetron (ZOFRAN-ODT) 4 mg disintegrating tablet Not Taking  No No   Sig: Take 1 tablet (4 mg total) by mouth every 6 (six) hours as needed for nausea or vomiting   Patient not taking: Reported on 12/14/2024   pantoprazole (PROTONIX) 40 mg tablet   No No   Sig: Take 1 tablet (40 mg total) by mouth daily   polyethylene glycol (GLYCOLAX) 17 GM/SCOOP powder Not Taking Self No No   Sig: Take 17 g by mouth as needed (constipation) Take two doses of 17 g mixed with eight ounces of water each initially, then one dose per hour until you have a bowel movement.   Patient not taking: Reported on 12/14/2024   sucralfate (CARAFATE) 1 g tablet 12/13/2024 Self No Yes   Sig: Take 1 tablet (1 g total) by mouth 3 (three) times a day   triamcinolone (KENALOG) 0.1 % oral topical paste Past Week  No Yes   Sig: Apply 1 Application topically 2 (two) times a day      Facility-Administered Medications: None     Current Discharge Medication List        CONTINUE these medications which have NOT CHANGED    Details   famotidine (PEPCID) 40 MG tablet Take 1 tablet (40 mg total) by mouth daily at bedtime  Qty: 30 tablet, Refills: 3    Associated Diagnoses: Vocal cord dysfunction; Dysphagia, unspecified type; Laryngopharyngeal reflux (LPR)      losartan (COZAAR) 25 mg tablet Take 25 mg by mouth daily      meloxicam (MOBIC) 15 mg tablet       Multiple Vitamins-Minerals (CENTRUM SILVER PO) Take by mouth daily      Rytary 48. MG CPCR       sucralfate (CARAFATE) 1 g tablet Take 1 tablet (1 g total) by mouth 3 (three) times a day  Qty: 60 tablet, Refills: 0    Associated Diagnoses: Abdominal pain      triamcinolone (KENALOG) 0.1 % oral topical paste Apply 1 Application topically 2 (two) times a day  Qty: 5 g, Refills: 3    Associated Diagnoses: Oral ulcer      docusate sodium (COLACE) 100 mg capsule Take 1 capsule (100 mg total) by mouth 2 (two) times a day  Qty: 10 capsule,  Refills: 0    Associated Diagnoses: Chronic idiopathic constipation      ondansetron (ZOFRAN) 4 mg tablet Take 1 tablet (4 mg total) by mouth every 8 (eight) hours as needed for nausea or vomiting  Qty: 20 tablet, Refills: 0    Associated Diagnoses: COVID-19      !! ondansetron (ZOFRAN-ODT) 4 mg disintegrating tablet Take 1 tablet (4 mg total) by mouth every 6 (six) hours as needed for nausea or vomiting  Qty: 12 tablet, Refills: 0    Associated Diagnoses: Nausea and vomiting      !! ondansetron (ZOFRAN-ODT) 4 mg disintegrating tablet Take 1 tablet (4 mg total) by mouth every 6 (six) hours as needed for nausea or vomiting  Qty: 20 tablet, Refills: 0    Associated Diagnoses: Nausea and vomiting      pantoprazole (PROTONIX) 40 mg tablet Take 1 tablet (40 mg total) by mouth daily  Qty: 30 tablet, Refills: 1    Associated Diagnoses: Nausea and vomiting, unspecified vomiting type; Epigastric pain; Weight loss      polyethylene glycol (GLYCOLAX) 17 GM/SCOOP powder Take 17 g by mouth as needed (constipation) Take two doses of 17 g mixed with eight ounces of water each initially, then one dose per hour until you have a bowel movement.  Qty: 578 g, Refills: 0    Associated Diagnoses: Constipation       !! - Potential duplicate medications found. Please discuss with provider.        No discharge procedures on file.  ED SEPSIS DOCUMENTATION   Time reflects when diagnosis was documented in both MDM as applicable and the Disposition within this note       Time User Action Codes Description Comment    12/14/2024 10:14 AM Penny Pretty [K57.20] Diverticulitis of large intestine with abscess     12/14/2024 10:33 AM Adiel Nunez [I10] Essential hypertension     12/14/2024 10:33 AM Adiel Nunez [E78.2] Mixed hyperlipidemia     12/14/2024 10:33 AM Adiel Nunez [G25.81] Restless leg syndrome     12/14/2024 10:33 AM Adiel Nunez [G20.A1] Parkinson's disease, unspecified whether dyskinesia present,  unspecified whether manifestations fluctuate (HCC)                  Penny Pretty PA-C  12/14/24 1038       Penny Pretty PA-C  12/14/24 1515       Penny Pretty PA-C  12/14/24 1519

## 2024-12-14 NOTE — CONSULTS
Consultation - Hospitalist   Name: Momo Swann 89 y.o. male I MRN: [id number]  Unit/Bed#: ED 13 I Date of Admission: 12/14/2024   Date of Service: 12/14/2024 I Hospital Day: 0   Inpatient consult to Internal Medicine  Consult performed by: Juan Anaya MD  Consult ordered by: Adiel Nunez DO        Physician Requesting Evaluation: Adiel Nunez DO   Reason for Evaluation / Principal Problem: Diverticulitis    Assessment & Plan  Diverticulitis of intestine with abscess  Presented with abdominal pain found to have diverticulitis with abscess formation  Being managed by general surgery as primary  Continue with IV antibiotics  Bowel rest  IV fluids  Rest of plan per general surgery team  Parkinson disease (HCC)  Continue home meds  Essential hypertension  BP currently controlled  Continue losartan  GERD (gastroesophageal reflux disease)  Continue PPI        VTE Pharmacologic Prophylaxis:   Moderate Risk (Score 3-4) - Pharmacological DVT Prophylaxis Ordered: heparin.  Code Status: Full code  Discussion with family: Patient declined call to .     Anticipated Length of Stay: Patient will be admitted on an inpatient basis with an anticipated length of stay of greater than 2 midnights secondary to diverticulitis with abscess.    History of Present Illness   Chief Complaint: Abdominal pain    Momo Swann is a 89 y.o. male with past medical history significant Parkinson's disease, hypertension initially presented with lower abdominal pain that began approximately 3 days ago.  Also reports intermittent nausea.  Otherwise denies any other acute complaints.  Denies chest pain, shortness of breath, cough, fevers, chills or any other complaints    Review of Systems   Constitutional:  Negative for appetite change, chills, diaphoresis, fatigue, fever and unexpected weight change.   HENT:  Negative for congestion, rhinorrhea and sore throat.    Eyes:  Negative for photophobia and visual disturbance.    Respiratory:  Negative for cough, shortness of breath and wheezing.    Cardiovascular:  Negative for chest pain, palpitations and leg swelling.   Gastrointestinal:  Positive for abdominal pain and nausea. Negative for anal bleeding, blood in stool, constipation, diarrhea and vomiting.   Genitourinary:  Negative for decreased urine volume, difficulty urinating, dysuria, flank pain, frequency, hematuria and urgency.   Musculoskeletal:  Negative for arthralgias, back pain, joint swelling and myalgias.   Skin:  Negative for color change and rash.   Neurological:  Negative for dizziness, seizures, facial asymmetry, speech difficulty, numbness and headaches.   Psychiatric/Behavioral:  Negative for agitation, confusion and decreased concentration. The patient is not nervous/anxious.        Historical Information   Past Medical History:   Diagnosis Date    Arthritis     Bladder stones     Hypertension     Parkinson disease (HCC)      Past Surgical History:   Procedure Laterality Date    COLONOSCOPY      FOOT SURGERY      PA CYSTOLITHOTOMY CYSTOTOMY W/RMVL CALCULUS N/A 8/5/2021    Procedure: CYSTOLITHOTOMY OPEN;  Surgeon: Adiel Niño MD;  Location: [facility];  Service: Urology    PA TRURL ELECTROSURG RESCJ PROSTATE BLEED COMPLETE N/A 8/5/2021    Procedure: TRANSURETHRAL RESECTION OF PROSTATE (TURP);  Surgeon: Adiel Niño MD;  Location: [facility];  Service: Urology    TONSILLECTOMY       Social History     Tobacco Use    Smoking status: Never    Smokeless tobacco: Never   Vaping Use    Vaping status: Never Used   Substance and Sexual Activity    Alcohol use: Never    Drug use: Never    Sexual activity: Yes     E-Cigarette/Vaping    E-Cigarette Use Never User      E-Cigarette/Vaping Substances    Nicotine No     THC No     CBD No     Flavoring No     Other No     Unknown No      Family History   Problem Relation Age of Onset    No Known Problems Mother     Lung cancer Father     No Known Problems Sister     No  Known Problems Sister     Lung cancer Sister      Social History:  Marital Status: /Civil Union     Patient Pre-hospital Living Situation: Home  Patient Pre-hospital Level of Mobility: walks  Patient Pre-hospital Diet Restrictions: none    Meds/Allergies   I have reviewed home medications with patient personally.  Prior to Admission medications    Medication Sig Start Date End Date Taking? Authorizing Provider   docusate sodium (COLACE) 100 mg capsule Take 1 capsule (100 mg total) by mouth 2 (two) times a day 8/14/21   Elvia Vega PA-C   famotidine (PEPCID) 40 MG tablet Take 1 tablet (40 mg total) by mouth daily at bedtime 7/22/24   Mini Harrison PA-C   losartan (COZAAR) 25 mg tablet Take 25 mg by mouth daily    Historical Provider, MD   meloxicam (MOBIC) 15 mg tablet  1/31/24   Historical Provider, MD   Multiple Vitamins-Minerals (CENTRUM SILVER PO) Take by mouth daily    Historical Provider, MD   ondansetron (ZOFRAN) 4 mg tablet Take 1 tablet (4 mg total) by mouth every 8 (eight) hours as needed for nausea or vomiting 10/9/24   Rosetta Eli MD   ondansetron (ZOFRAN-ODT) 4 mg disintegrating tablet Take 1 tablet (4 mg total) by mouth every 6 (six) hours as needed for nausea or vomiting 2/11/24   Charles Barakat MD   ondansetron (ZOFRAN-ODT) 4 mg disintegrating tablet Take 1 tablet (4 mg total) by mouth every 6 (six) hours as needed for nausea or vomiting 7/19/24   Jaja Reyes MD   pantoprazole (PROTONIX) 40 mg tablet Take 1 tablet (40 mg total) by mouth daily 2/15/24 4/15/24  Trisha Ashford PA-C   polyethylene glycol (GLYCOLAX) 17 GM/SCOOP powder Take 17 g by mouth as needed (constipation) Take two doses of 17 g mixed with eight ounces of water each initially, then one dose per hour until you have a bowel movement. 2/11/24   Charles Barakat MD   Rytary 48. MG CPCR  2/5/24   Historical Provider, MD   sucralfate (CARAFATE) 1 g tablet Take 1 tablet (1 g total)  by mouth 3 (three) times a day 2/11/24   Charles Barakat MD   triamcinolone (KENALOG) 0.1 % oral topical paste Apply 1 Application topically 2 (two) times a day 7/22/24   Mini Harrison PA-C     No Known Allergies    Objective :  Temp:  [97.7 °F (36.5 °C)] 97.7 °F (36.5 °C)  HR:  [81] 81  BP: (161)/(82) 161/82  Resp:  [16] 16  SpO2:  [92 %] 92 %    Physical Exam  Constitutional:       General: He is not in acute distress.     Appearance: He is well-developed. He is not diaphoretic.   HENT:      Head: Normocephalic and atraumatic.      Nose: Nose normal.      Mouth/Throat:      Pharynx: No oropharyngeal exudate.   Eyes:      General: No scleral icterus.     Conjunctiva/sclera: Conjunctivae normal.   Neck:      Vascular: No JVD.   Cardiovascular:      Rate and Rhythm: Normal rate and regular rhythm.      Heart sounds: Normal heart sounds. No murmur heard.     No friction rub. No gallop.   Pulmonary:      Effort: Pulmonary effort is normal. No respiratory distress.      Breath sounds: Normal breath sounds. No wheezing or rales.   Chest:      Chest wall: No tenderness.   Abdominal:      General: Bowel sounds are normal. There is no distension.      Palpations: Abdomen is soft.      Tenderness: There is no abdominal tenderness. There is no guarding.   Musculoskeletal:         General: No tenderness or deformity. Normal range of motion.      Cervical back: Normal range of motion.   Lymphadenopathy:      Cervical: No cervical adenopathy.   Skin:     General: Skin is warm and dry.      Findings: No erythema.   Neurological:      Mental Status: He is alert. Mental status is at baseline.      Cranial Nerves: No cranial nerve deficit.      Coordination: Coordination normal.      Deep Tendon Reflexes: Reflexes normal.          Lines/Drains:            Lab Results: I have reviewed the following results:  Results from last 7 days   Lab Units 12/14/24  0826   WBC Thousand/uL 10.39*   HEMOGLOBIN g/dL 14.8   HEMATOCRIT %  "44.6   PLATELETS Thousands/uL 182   SEGS PCT % 81*   LYMPHO PCT % 8*   MONO PCT % 8   EOS PCT % 2     Results from last 7 days   Lab Units 12/14/24  0826   SODIUM mmol/L 139   POTASSIUM mmol/L 4.3   CHLORIDE mmol/L 103   CO2 mmol/L 30   BUN mg/dL 19   CREATININE mg/dL 0.87   ANION GAP mmol/L 6   CALCIUM mg/dL 9.4   ALBUMIN g/dL 5.0   TOTAL BILIRUBIN mg/dL 1.14*   ALK PHOS U/L 53   ALT U/L 5*   AST U/L 32   GLUCOSE RANDOM mg/dL 99             No results found for: \"HGBA1C\"  Results from last 7 days   Lab Units 12/14/24  0845   LACTIC ACID mmol/L 1.2       Imaging Results Review: I personally reviewed the following image studies/reports in PACS and discussed pertinent findings with Radiology: chest xray. My interpretation of the radiology images/reports is:  .  Other Study Results Review: EKG was reviewed.     Administrative Statements   I have spent a total time of 60 minutes in caring for this patient on the day of the visit/encounter including Diagnostic results.    ** Please Note: This note has been constructed using a voice recognition system. **    "

## 2024-12-15 LAB
ALBUMIN SERPL BCG-MCNC: 3.8 G/DL (ref 3.5–5)
ALP SERPL-CCNC: 48 U/L (ref 34–104)
ALT SERPL W P-5'-P-CCNC: 3 U/L (ref 7–52)
ANION GAP SERPL CALCULATED.3IONS-SCNC: 5 MMOL/L (ref 4–13)
AST SERPL W P-5'-P-CCNC: 13 U/L (ref 13–39)
BASOPHILS # BLD AUTO: 0.02 THOUSANDS/ÂΜL (ref 0–0.1)
BASOPHILS NFR BLD AUTO: 0 % (ref 0–1)
BILIRUB SERPL-MCNC: 1.05 MG/DL (ref 0.2–1)
BUN SERPL-MCNC: 17 MG/DL (ref 5–25)
CALCIUM SERPL-MCNC: 8.5 MG/DL (ref 8.4–10.2)
CHLORIDE SERPL-SCNC: 106 MMOL/L (ref 96–108)
CO2 SERPL-SCNC: 28 MMOL/L (ref 21–32)
CREAT SERPL-MCNC: 0.9 MG/DL (ref 0.6–1.3)
EOSINOPHIL # BLD AUTO: 0.11 THOUSAND/ÂΜL (ref 0–0.61)
EOSINOPHIL NFR BLD AUTO: 2 % (ref 0–6)
ERYTHROCYTE [DISTWIDTH] IN BLOOD BY AUTOMATED COUNT: 12.2 % (ref 11.6–15.1)
GFR SERPL CREATININE-BSD FRML MDRD: 76 ML/MIN/1.73SQ M
GLUCOSE SERPL-MCNC: 94 MG/DL (ref 65–140)
HCT VFR BLD AUTO: 38.8 % (ref 36.5–49.3)
HGB BLD-MCNC: 13.1 G/DL (ref 12–17)
IMM GRANULOCYTES # BLD AUTO: 0.02 THOUSAND/UL (ref 0–0.2)
IMM GRANULOCYTES NFR BLD AUTO: 0 % (ref 0–2)
LYMPHOCYTES # BLD AUTO: 0.76 THOUSANDS/ÂΜL (ref 0.6–4.47)
LYMPHOCYTES NFR BLD AUTO: 16 % (ref 14–44)
MAGNESIUM SERPL-MCNC: 1.8 MG/DL (ref 1.9–2.7)
MCH RBC QN AUTO: 32.3 PG (ref 26.8–34.3)
MCHC RBC AUTO-ENTMCNC: 33.8 G/DL (ref 31.4–37.4)
MCV RBC AUTO: 96 FL (ref 82–98)
MONOCYTES # BLD AUTO: 0.46 THOUSAND/ÂΜL (ref 0.17–1.22)
MONOCYTES NFR BLD AUTO: 10 % (ref 4–12)
NEUTROPHILS # BLD AUTO: 3.43 THOUSANDS/ÂΜL (ref 1.85–7.62)
NEUTS SEG NFR BLD AUTO: 72 % (ref 43–75)
NRBC BLD AUTO-RTO: 0 /100 WBCS
PHOSPHATE SERPL-MCNC: 2.8 MG/DL (ref 2.3–4.1)
PLATELET # BLD AUTO: 159 THOUSANDS/UL (ref 149–390)
PMV BLD AUTO: 10.4 FL (ref 8.9–12.7)
POTASSIUM SERPL-SCNC: 4.1 MMOL/L (ref 3.5–5.3)
PROT SERPL-MCNC: 6 G/DL (ref 6.4–8.4)
RBC # BLD AUTO: 4.05 MILLION/UL (ref 3.88–5.62)
SODIUM SERPL-SCNC: 139 MMOL/L (ref 135–147)
WBC # BLD AUTO: 4.8 THOUSAND/UL (ref 4.31–10.16)

## 2024-12-15 PROCEDURE — 84100 ASSAY OF PHOSPHORUS: CPT | Performed by: STUDENT IN AN ORGANIZED HEALTH CARE EDUCATION/TRAINING PROGRAM

## 2024-12-15 PROCEDURE — 99232 SBSQ HOSP IP/OBS MODERATE 35: CPT | Performed by: INTERNAL MEDICINE

## 2024-12-15 PROCEDURE — 83735 ASSAY OF MAGNESIUM: CPT | Performed by: STUDENT IN AN ORGANIZED HEALTH CARE EDUCATION/TRAINING PROGRAM

## 2024-12-15 PROCEDURE — 99231 SBSQ HOSP IP/OBS SF/LOW 25: CPT | Performed by: STUDENT IN AN ORGANIZED HEALTH CARE EDUCATION/TRAINING PROGRAM

## 2024-12-15 PROCEDURE — 80053 COMPREHEN METABOLIC PANEL: CPT | Performed by: STUDENT IN AN ORGANIZED HEALTH CARE EDUCATION/TRAINING PROGRAM

## 2024-12-15 PROCEDURE — 85025 COMPLETE CBC W/AUTO DIFF WBC: CPT | Performed by: STUDENT IN AN ORGANIZED HEALTH CARE EDUCATION/TRAINING PROGRAM

## 2024-12-15 RX ADMIN — LOSARTAN POTASSIUM 25 MG: 25 TABLET, FILM COATED ORAL at 09:28

## 2024-12-15 RX ADMIN — METRONIDAZOLE 500 MG: 500 INJECTION, SOLUTION INTRAVENOUS at 01:54

## 2024-12-15 RX ADMIN — METRONIDAZOLE 500 MG: 500 INJECTION, SOLUTION INTRAVENOUS at 18:02

## 2024-12-15 RX ADMIN — METRONIDAZOLE 500 MG: 500 INJECTION, SOLUTION INTRAVENOUS at 09:35

## 2024-12-15 RX ADMIN — LEVODOPA AND CARBIDOPA 3 CAPSULE: 195; 48.75 CAPSULE, EXTENDED RELEASE ORAL at 05:14

## 2024-12-15 RX ADMIN — HEPARIN SODIUM 5000 UNITS: 5000 INJECTION INTRAVENOUS; SUBCUTANEOUS at 05:13

## 2024-12-15 RX ADMIN — HEPARIN SODIUM 5000 UNITS: 5000 INJECTION INTRAVENOUS; SUBCUTANEOUS at 21:20

## 2024-12-15 RX ADMIN — HEPARIN SODIUM 5000 UNITS: 5000 INJECTION INTRAVENOUS; SUBCUTANEOUS at 14:35

## 2024-12-15 RX ADMIN — SODIUM CHLORIDE 100 ML/HR: 0.9 INJECTION, SOLUTION INTRAVENOUS at 01:54

## 2024-12-15 RX ADMIN — CEFTRIAXONE SODIUM 2000 MG: 10 INJECTION, POWDER, FOR SOLUTION INTRAVENOUS at 11:25

## 2024-12-15 RX ADMIN — LEVODOPA AND CARBIDOPA 3 CAPSULE: 195; 48.75 CAPSULE, EXTENDED RELEASE ORAL at 21:21

## 2024-12-15 RX ADMIN — LEVODOPA AND CARBIDOPA 3 CAPSULE: 195; 48.75 CAPSULE, EXTENDED RELEASE ORAL at 12:35

## 2024-12-15 RX ADMIN — FAMOTIDINE 40 MG: 20 TABLET, FILM COATED ORAL at 21:20

## 2024-12-15 NOTE — PROGRESS NOTES
Progress Note - Hospitalist   Name: Momo Swann 89 y.o. male I MRN: [id number]  Unit/Bed#: -01 I Date of Admission: 12/14/2024   Date of Service: 12/15/2024 I Hospital Day: 1    Assessment & Plan  Diverticulitis of intestine with abscess  Presented with abdominal pain found to have diverticulitis with abscess formation  Being managed by general surgery as primary  Continue with IV antibiotics  Bowel rest  IV fluids  Rest of plan per general surgery team  Parkinson disease (HCC)  Continue home carbidopa-levodopa 3 times daily  Essential hypertension  BP currently controlled  Continue losartan  GERD (gastroesophageal reflux disease)  Continue Pepcid    VTE Pharmacologic Prophylaxis:   Moderate Risk (Score 3-4) - Pharmacological DVT Prophylaxis Ordered: heparin.    Mobility:   Basic Mobility Inpatient Raw Score: 18  JH-HLM Goal: 6: Walk 10 steps or more  JH-HLM Achieved: 6: Walk 10 steps or more  JH-HLM Goal achieved. Continue to encourage appropriate mobility.    Patient Centered Rounds: I performed bedside rounds with nursing staff today.   Discussions with Specialists or Other Care Team Provider: cm, nursing    Education and Discussions with Family / Patient: Patient declined call to .     Current Length of Stay: 1 day(s)  Current Patient Status: Inpatient   Certification Statement: The patient will continue to require additional inpatient hospital stay due to see below  Discharge Plan:  Per primary team    Code Status: Level 1 - Full Code    Subjective   Denies fevers, chills, cough, chest pain    Objective :  Temp:  [98.3 °F (36.8 °C)] 98.3 °F (36.8 °C)  HR:  [73-85] 73  BP: (120-152)/(71-78) 152/78  Resp:  [17-18] 18  SpO2:  [91 %-92 %] 92 %  O2 Device: None (Room air)    There is no height or weight on file to calculate BMI.     Input and Output Summary (last 24 hours):     Intake/Output Summary (Last 24 hours) at 12/15/2024 1112  Last data filed at 12/15/2024 0521  Gross per 24 hour    Intake 100 ml   Output 500 ml   Net -400 ml       Physical Exam  Constitutional:       General: He is not in acute distress.     Appearance: He is well-developed. He is not diaphoretic.   HENT:      Head: Normocephalic and atraumatic.      Nose: Nose normal.      Mouth/Throat:      Pharynx: No oropharyngeal exudate.   Eyes:      General: No scleral icterus.     Conjunctiva/sclera: Conjunctivae normal.   Cardiovascular:      Rate and Rhythm: Normal rate and regular rhythm.      Heart sounds: Normal heart sounds. No murmur heard.     No friction rub. No gallop.   Pulmonary:      Effort: Pulmonary effort is normal. No respiratory distress.      Breath sounds: Normal breath sounds. No wheezing or rales.   Chest:      Chest wall: No tenderness.   Abdominal:      General: Bowel sounds are normal. There is no distension.      Palpations: Abdomen is soft.      Tenderness: There is no abdominal tenderness. There is no guarding.   Musculoskeletal:         General: No tenderness or deformity. Normal range of motion.      Cervical back: Normal range of motion and neck supple.   Skin:     General: Skin is warm and dry.      Findings: No erythema.   Neurological:      Mental Status: He is alert. Mental status is at baseline.           Lines/Drains:              Lab Results: I have reviewed the following results:   Results from last 7 days   Lab Units 12/15/24  0521   WBC Thousand/uL 4.80   HEMOGLOBIN g/dL 13.1   HEMATOCRIT % 38.8   PLATELETS Thousands/uL 159   SEGS PCT % 72   LYMPHO PCT % 16   MONO PCT % 10   EOS PCT % 2     Results from last 7 days   Lab Units 12/15/24  0521   SODIUM mmol/L 139   POTASSIUM mmol/L 4.1   CHLORIDE mmol/L 106   CO2 mmol/L 28   BUN mg/dL 17   CREATININE mg/dL 0.90   ANION GAP mmol/L 5   CALCIUM mg/dL 8.5   ALBUMIN g/dL 3.8   TOTAL BILIRUBIN mg/dL 1.05*   ALK PHOS U/L 48   ALT U/L 3*   AST U/L 13   GLUCOSE RANDOM mg/dL 94                 Results from last 7 days   Lab Units 12/14/24  0845   LACTIC  ACID mmol/L 1.2       Recent Cultures (last 7 days):         Imaging Results Review: I personally reviewed the following image studies/reports in PACS and discussed pertinent findings with Radiology: chest xray. My interpretation of the radiology images/reports is:  .  Other Study Results Review: EKG was reviewed.     Last 24 Hours Medication List:     Current Facility-Administered Medications:     acetaminophen (TYLENOL) tablet 650 mg, Q6H PRN    Carbidopa-Levodopa ER 48. MG CPCR 3 capsule, TID    cefTRIAXone (ROCEPHIN) 2,000 mg in dextrose 5 % 50 mL IVPB, Q24H    famotidine (PEPCID) tablet 40 mg, HS    heparin (porcine) subcutaneous injection 5,000 Units, Q8H LOYDA    losartan (COZAAR) tablet 25 mg, Daily    metroNIDAZOLE (FLAGYL) IVPB (premix) 500 mg 100 mL, Q8H, Last Rate: 500 mg (12/15/24 0935)    morphine injection 2 mg, Q3H PRN    oxyCODONE (ROXICODONE) IR tablet 5 mg, Q6H PRN    sodium chloride 0.9 % infusion, Continuous, Last Rate: 100 mL/hr (12/15/24 0154)    traMADol (ULTRAM) tablet 50 mg, Q6H PRN    Administrative Statements   Today, Patient Was Seen By: Juan Anaya MD  I have spent a total time of 30 minutes in caring for this patient on the day of the visit/encounter including Diagnostic results.    **Please Note: This note may have been constructed using a voice recognition system.**

## 2024-12-15 NOTE — ASSESSMENT & PLAN NOTE
89-year-old male with diverticulitis with intramural abscess   -Patient states his abdominal pain has resolved  -He is passing flatus  -White blood cell count 4.8 from 10.39  -Abdominal exam benign  -Start clear liquid diet and monitor toleration  -Continue IV antibiotics

## 2024-12-15 NOTE — UTILIZATION REVIEW
Initial Clinical Review    Admission: Date/Time/Statement:   Admission Orders (From admission, onward)       Ordered        12/14/24 1016  INPATIENT ADMISSION  Once                          Orders Placed This Encounter   Procedures    INPATIENT ADMISSION     Standing Status:   Standing     Number of Occurrences:   1     Level of Care:   Med Surg [16]     Estimated length of stay:   More than 2 Midnights     Certification:   I certify that inpatient services are medically necessary for this patient for a duration of greater than two midnights. See H&P and MD Progress Notes for additional information about the patient's course of treatment.     ED Arrival Information       Expected   -    Arrival   12/14/2024 07:49    Acuity   Urgent              Means of arrival   Walk-In    Escorted by   Spouse    Service   Surgery-General    Admission type   Emergency              Arrival complaint   FLANK PAIN             Chief Complaint   Patient presents with    Abdominal Pain     Patient c.o LLQ pain x several days, worse this morning. Possible blood in urine. Denies n/v/d       Initial Presentation: 89 y.o. male presents to ED from home  with abdominal pain  for 2 days,  no pain like this previously.  Pain in  left lower abdomen.  Ct abdomen shows  diverticulitis.  Labs  show  WBC  10.3 with 81  % shift.  Admit  Ip with  Diverticulitis with Abscess  and plan is  IVF,  Npo,  bowel rest,   MANUEL, pain  control  and  non operative management for now.      Patient will be admitted on an inpatient basis with an anticipated length of stay of greater than 2 midnights secondary to diverticulitis with abscess.       Date:   12/15   Day 2:   Continue   MANUEL  and  IVF.   Continue pain  control as needed.  Starting   cl liq  diet  today.      ED Treatment-Medication Administration from 12/14/2024 0749 to 12/14/2024 1200         Date/Time Order Dose Route Action     12/14/2024 0835 losartan (COZAAR) tablet 25 mg 25 mg Oral Given      12/14/2024 0924 iohexol (OMNIPAQUE) 350 MG/ML injection (MULTI-DOSE) 100 mL 100 mL Intravenous Given     12/14/2024 1113 ceftriaxone (ROCEPHIN) 1 g/50 mL in dextrose IVPB 1,000 mg Intravenous New Bag     12/14/2024 1040 metroNIDAZOLE (FLAGYL) IVPB (premix) 500 mg 100 mL 500 mg Intravenous New Bag            Scheduled Medications:  Carbidopa-Levodopa ER, 3 capsule, Oral, TID  cefTRIAXone, 2,000 mg, Intravenous, Q24H  famotidine, 40 mg, Oral, HS  heparin (porcine), 5,000 Units, Subcutaneous, Q8H LOYDA  losartan, 25 mg, Oral, Daily  metroNIDAZOLE, 500 mg, Intravenous, Q8H      Continuous IV Infusions:  sodium chloride, 75 mL/hr, Intravenous, Continuous      PRN Meds:  acetaminophen, 650 mg, Oral, Q6H PRN  morphine injection, 2 mg, Intravenous, Q3H PRN  oxyCODONE, 5 mg, Oral, Q6H PRN  traMADol, 50 mg, Oral, Q6H PRN      ED Triage Vitals   Temperature Pulse Respirations Blood Pressure SpO2 Pain Score   12/14/24 0758 12/14/24 0756 12/14/24 0756 12/14/24 0756 12/14/24 0756 12/14/24 1207   97.7 °F (36.5 °C) 81 16 161/82 92 % No Pain           Vital Signs (last 3 days)       Date/Time Temp Pulse Resp BP MAP (mmHg) SpO2 O2 Device Patient Position - Orthostatic VS [place] Coma Scale Score Pain    12/15/24 0900 -- 73 18 152/78 107 -- -- Sitting -- --    12/14/24 2100 -- 85 -- 120/71 90 92 % None (Room air) Lying -- --    12/14/24 1930 -- -- -- -- -- -- -- -- 15 No Pain    12/14/24 1500 -- 85 -- 141/75 101 -- -- Lying -- --    12/14/24 1300 -- -- -- -- -- -- -- -- 15 --    12/14/24 1207 98.3 °F (36.8 °C) 81 17 141/74 101 91 % -- Sitting -- No Pain    12/14/24 0806 -- -- -- -- -- -- -- -- 15 --    12/14/24 0758 97.7 °F (36.5 °C) -- -- -- -- -- -- -- -- --    12/14/24 0756 -- 81 16 161/82 -- 92 % -- -- -- --              Pertinent Labs/Diagnostic Test Results:   Radiology:  CT abdomen pelvis with contrast   Final Interpretation by Jb Sky MD (12/14 0948)      Findings consistent with acute diverticulitis at the junction of the  distal descending and sigmoid colon complicated by a small 1.7 x 1.1 cm intramural abscess. No evidence for macroperforation.      Cholelithiasis; no pericholecystic inflammation.      Mild hepatomegaly/hepatic steatosis.      Spleen similarly top normal in size.      The study was marked in EPIC for immediate notification.               Workstation performed: [id number]           Cardiology:  ECG 12 lead   Final Result by Charles Monge MD (12/14 1334)   Sinus rhythm with Premature supraventricular complexes   Right bundle branch block   Abnormal ECG   When compared with ECG of 09-Oct-2024 18:39,   Premature supraventricular complexes are now Present   Confirmed by Charles Monge ([id number]) on 12/14/2024 1:34:09 PM            Results from last 7 days   Lab Units 12/15/24  0521 12/14/24  0826   WBC Thousand/uL 4.80 10.39*   HEMOGLOBIN g/dL 13.1 14.8   HEMATOCRIT % 38.8 44.6   PLATELETS Thousands/uL 159 182   TOTAL NEUT ABS Thousands/µL 3.43 8.52*         Results from last 7 days   Lab Units 12/15/24  0521 12/14/24  0826   SODIUM mmol/L 139 139   POTASSIUM mmol/L 4.1 4.3   CHLORIDE mmol/L 106 103   CO2 mmol/L 28 30   ANION GAP mmol/L 5 6   BUN mg/dL 17 19   CREATININE mg/dL 0.90 0.87   EGFR ml/min/1.73sq m 76 77   CALCIUM mg/dL 8.5 9.4   MAGNESIUM mg/dL 1.8*  --    PHOSPHORUS mg/dL 2.8  --      Results from last 7 days   Lab Units 12/15/24  0521 12/14/24  0826   AST U/L 13 32   ALT U/L 3* 5*   ALK PHOS U/L 48 53   TOTAL PROTEIN g/dL 6.0* 7.6   ALBUMIN g/dL 3.8 5.0   TOTAL BILIRUBIN mg/dL 1.05* 1.14*         Results from last 7 days   Lab Units 12/15/24  0521 12/14/24  0826   GLUCOSE RANDOM mg/dL 94 99           Results from last 7 days   Lab Units 12/14/24  1040 12/14/24  0826   HS TNI 0HR ng/L  --  6   HS TNI 2HR ng/L 5  --    HSTNI D2 ng/L -1  --                      Results from last 7 days   Lab Units 12/14/24  0845   LACTIC ACID mmol/L 1.2               Results from last 7 days   Lab Units 12/14/24  0826    LIPASE u/L 22                 Results from last 7 days   Lab Units 12/14/24  0950   CLARITY UA  Clear   COLOR UA  Light Yellow   SPEC GRAV UA  >=1.050*   PH UA  5.5   GLUCOSE UA mg/dl Negative   KETONES UA mg/dl Trace*   BLOOD UA  Negative   PROTEIN UA mg/dl Trace*   NITRITE UA  Negative   BILIRUBIN UA  Negative   UROBILINOGEN UA (BE) mg/dl <2.0   LEUKOCYTES UA  Negative   WBC UA /hpf 1-2   RBC UA /hpf None Seen   BACTERIA UA /hpf None Seen   EPITHELIAL CELLS WET PREP /hpf Occasional   MUCUS THREADS  Occasional*                   Present on Admission:   Parkinson disease (HCC)   Essential hypertension      Admitting Diagnosis: Mixed hyperlipidemia [E78.2]  Restless leg syndrome [G25.81]  Essential hypertension [I10]  Flank pain [R10.9]  Diverticulitis of large intestine with abscess [K57.20]  Parkinson's disease, unspecified whether dyskinesia present, unspecified whether manifestations fluctuate (HCC) [G20.A1]  Age/Sex: 89 y.o. male    Network Utilization Review Department  ATTENTION: Please call with any questions or concerns to [phone] and carefully listen to the prompts so that you are directed to the right person. All voicemails are confidential.   For Discharge needs, contact Care Management DC Support Team at [phone] opt. 2  Send all requests for admission clinical reviews, approved or denied determinations and any other requests to dedicated fax number below belonging to the campus where the patient is receiving treatment. List of dedicated fax numbers for the Facilities:  FACILITY NAME UR FAX NUMBER   ADMISSION DENIALS (Administrative/Medical Necessity) [phone]   DISCHARGE SUPPORT TEAM (NETWORK) [phone]   PARENT CHILD HEALTH (Maternity/NICU/Pediatrics) [phone]   [facility] [phone]   [facility] [phone]   Critical access hospital [phone]   [facility] [phone]   [facility]  [facility] [phone]   [facility] [phone]   [facility] [phone]   [facility] [phone]   [facility] [phone]   [facility] [phone]   [facility] [phone]   [facility] [phone]

## 2024-12-15 NOTE — PROGRESS NOTES
Progress Note - Surgery-General   Name: Momo Swann 89 y.o. male I MRN: [id number]  Unit/Bed#: -01 I Date of Admission: 12/14/2024   Date of Service: 12/15/2024 I Hospital Day: 1     Assessment & Plan  Diverticulitis of intestine with abscess  89-year-old male with diverticulitis with intramural abscess   -Patient states his abdominal pain has resolved  -He is passing flatus  -White blood cell count 4.8 from 10.39  -Abdominal exam benign  -Start clear liquid diet and monitor toleration  -Continue IV antibiotics        Subjective   Patient seen and examined at bedside.  No acute events overnight.  Denies any abdominal pain.  He is passing flatus.    Objective :  Temp:  [98.3 °F (36.8 °C)] 98.3 °F (36.8 °C)  HR:  [73-85] 73  BP: (120-152)/(71-78) 152/78  Resp:  [17-18] 18  SpO2:  [91 %-92 %] 92 %  O2 Device: None (Room air)    I/O         12/13 0701 12/14 0700 12/14 0701  12/15 0700 12/15 0701  12/16 0700    IV Piggyback  100     Total Intake  100     Urine 225 500     Total Output 225 500     Net -225 -400                    Physical Exam  Constitutional:       Appearance: Normal appearance.   HENT:      Head: Normocephalic and atraumatic.      Nose: Nose normal.   Eyes:      General: No scleral icterus.     Conjunctiva/sclera: Conjunctivae normal.   Cardiovascular:      Rate and Rhythm: Normal rate  Pulmonary:      Effort: Pulmonary effort is normal.   Abdominal:      General: There is no distension.      Palpations: Abdomen is soft.      Tenderness: There is no abdominal tenderness.   Musculoskeletal:         General: No signs of injury.   Skin:     General: Skin is warm.      Coloration: Skin is not jaundiced.   Neurological:      General: No focal deficit present.      Mental Status: Patient is alert and oriented to person, place, and time.   Psychiatric:         Mood and Affect: Mood normal.         Behavior: Behavior normal.      Lab Results: I have reviewed the following results:  Recent Labs      12/14/24  0826 12/14/24  0845 12/14/24  1040 12/15/24  0521   WBC 10.39*  --   --  4.80   HGB 14.8  --   --  13.1   HCT 44.6  --   --  38.8     --   --  159   SODIUM 139  --   --  139   K 4.3  --   --  4.1     --   --  106   CO2 30  --   --  28   BUN 19  --   --  17   CREATININE 0.87  --   --  0.90   GLUC 99  --   --  94   MG  --   --   --  1.8*   PHOS  --   --   --  2.8   AST 32  --   --  13   ALT 5*  --   --  3*   ALB 5.0  --   --  3.8   TBILI 1.14*  --   --  1.05*   ALKPHOS 53  --   --  48   HSTNI0 6  --   --   --    HSTNI2  --   --  5  --    LACTICACID  --  1.2  --   --        VTE Pharmacologic Prophylaxis: Heparin  VTE Mechanical Prophylaxis: sequential compression device

## 2024-12-16 VITALS
OXYGEN SATURATION: 95 % | TEMPERATURE: 97.6 F | HEART RATE: 95 BPM | RESPIRATION RATE: 18 BRPM | DIASTOLIC BLOOD PRESSURE: 81 MMHG | BODY MASS INDEX: 23.98 KG/M2 | WEIGHT: 177.03 LBS | HEIGHT: 72 IN | SYSTOLIC BLOOD PRESSURE: 138 MMHG

## 2024-12-16 LAB
ANION GAP SERPL CALCULATED.3IONS-SCNC: 8 MMOL/L (ref 4–13)
BUN SERPL-MCNC: 17 MG/DL (ref 5–25)
CALCIUM SERPL-MCNC: 8.7 MG/DL (ref 8.4–10.2)
CHLORIDE SERPL-SCNC: 105 MMOL/L (ref 96–108)
CO2 SERPL-SCNC: 25 MMOL/L (ref 21–32)
CREAT SERPL-MCNC: 0.88 MG/DL (ref 0.6–1.3)
GFR SERPL CREATININE-BSD FRML MDRD: 77 ML/MIN/1.73SQ M
GLUCOSE SERPL-MCNC: 88 MG/DL (ref 65–140)
POTASSIUM SERPL-SCNC: 3.9 MMOL/L (ref 3.5–5.3)
SODIUM SERPL-SCNC: 138 MMOL/L (ref 135–147)

## 2024-12-16 PROCEDURE — 99238 HOSP IP/OBS DSCHRG MGMT 30/<: CPT | Performed by: PHYSICIAN ASSISTANT

## 2024-12-16 PROCEDURE — 80048 BASIC METABOLIC PNL TOTAL CA: CPT | Performed by: PHYSICIAN ASSISTANT

## 2024-12-16 PROCEDURE — NC001 PR NO CHARGE: Performed by: SURGERY

## 2024-12-16 RX ADMIN — LOSARTAN POTASSIUM 25 MG: 25 TABLET, FILM COATED ORAL at 10:34

## 2024-12-16 RX ADMIN — LEVODOPA AND CARBIDOPA 2 CAPSULE: 195; 48.75 CAPSULE, EXTENDED RELEASE ORAL at 06:20

## 2024-12-16 RX ADMIN — HEPARIN SODIUM 5000 UNITS: 5000 INJECTION INTRAVENOUS; SUBCUTANEOUS at 07:11

## 2024-12-16 RX ADMIN — METRONIDAZOLE 500 MG: 500 INJECTION, SOLUTION INTRAVENOUS at 10:00

## 2024-12-16 RX ADMIN — METRONIDAZOLE 500 MG: 500 INJECTION, SOLUTION INTRAVENOUS at 02:18

## 2024-12-16 NOTE — CASE MANAGEMENT
Case Management Assessment & Discharge Planning Note    Patient name Momo Swann  Location /-01 MRN [id number]  : 1935 Date 2024       Current Admission Date: 2024  Current Admission Diagnosis:Diverticulitis of intestine with abscess   Patient Active Problem List    Diagnosis Date Noted Date Diagnosed    Diverticulitis of intestine with abscess 2024     GERD (gastroesophageal reflux disease) 2024     Dysphagia 2023     Restless leg syndrome 2023     Tubular adenoma 2021     BPH (benign prostatic hyperplasia) 2021     Parkinson disease (HCC) 2021     Bladder stones 2021     Gross hematuria 2021     Enlarged prostate with lower urinary tract symptoms (LUTS) 2021     Encounter to discuss test results 2021     Near syncope 2021     Spinal stenosis of lumbar region with neurogenic claudication 2020     Atrophy of muscle of both lower legs 2020     Gait disturbance 2020     Benign prostatic hyperplasia with urinary frequency 2019     Heel pain, chronic, right 2019     Elevated PSA 2018     Kidney stones 2018     Allergic rhinitis 2018     Colon polyp 2018     Diverticular disease of colon 2018     Glaucoma 2018     Mixed hyperlipidemia 2018     Osteoarthritis 2018     Essential hypertension 2012       LOS (days): 2  Geometric Mean LOS (GMLOS) (days):   Days to GMLOS:     OBJECTIVE:    Risk of Unplanned Readmission Score: 8.91         Current admission status: Inpatient       Preferred Pharmacy:   [id number] - [place] - [address]  [address]  [place] [address]  Phone: [phone] Fax: [phone]    [facility] - [place] - [address]  [address]  [place] [address]  Phone: [phone] Fax: [phone]    Primary Care Provider: Iain Martell  DO    Primary Insurance: [facility]  Secondary Insurance:     ASSESSMENT:  Active Health Care Proxies       MICHAEL MORALES Health Care Agent - Spouse   Primary Phone: [phone] (Mobile)  Home Phone: [phone]                 Advance Directives  Does patient have a Health Care POA?: Yes  Does patient have Advance Directives?: Yes  Primary Contact: MICHAEL MORALES (Spouse)  [phone] (Mobile);  USE HOME NUMBER [phone]         Readmission Root Cause  30 Day Readmission: No    Patient Information  Admitted from:: Home  Mental Status: Alert  During Assessment patient was accompanied by: Spouse  Assessment information provided by:: Spouse  Primary Caregiver: Self  Support Systems: Spouse/significant other  County of Residence: [place]  What city do you live in[place]  Home entry access options. Select all that apply.: Stairs  Number of steps to enter home.: 1  Do the steps have railings?: Yes  Type of Current Residence: [facility]  Living Arrangements: Lives w/ Spouse/significant other  Is patient a ?: Yes  Is patient active with [facility] ( Affairs)?: No    Activities of Daily Living Prior to Admission  Functional Status: Independent  Completes ADLs independently?: Yes  Ambulates independently?: Yes  Does patient use assisted devices?: Yes (pt also holds onto furniture at home)  Assisted Devices (DME) used: Straight Cane, Other (Comment) (power lift chair)  Does patient currently own DME?: Yes  What DME does the patient currently own?: Straight Cane, Other (Comment), Walker (powerlift chair)  Does patient have a history of Outpatient Therapy (PT/OT)?:  ([facility])  Does the patient have a history of Short-Term Rehab?: No  Does patient have a history of HHC?: No  Does patient currently have HHC?: No         Patient Information Continued  Income Source: Other (Comment) (SSI and pension)  Does patient have prescription coverage?: Yes  Does patient receive dialysis treatments?:  No  Does patient have a history of substance abuse?: No  Does patient have a history of Mental Health Diagnosis?: No         Means of Transportation  Means of Transport to Appts:: Family transport      Social Determinants of Health (SDOH)      Flowsheet Row Most Recent Value   Housing Stability    In the last 12 months, was there a time when you were not able to pay the mortgage or rent on time? N   In the past 12 months, how many times have you moved where you were living? 0   At any time in the past 12 months, were you homeless or living in a shelter (including now)? N   Transportation Needs    In the past 12 months, has lack of transportation kept you from medical appointments or from getting medications? no   In the past 12 months, has lack of transportation kept you from meetings, work, or from getting things needed for daily living? No   Food Insecurity    Within the past 12 months, you worried that your food would run out before you got the money to buy more. Never true   Within the past 12 months, the food you bought just didn't last and you didn't have money to get more. Never true   Utilities    In the past 12 months has the electric, gas, oil, or water company threatened to shut off services in your home? No            DISCHARGE DETAILS:    Discharge planning discussed with:: pt and spouse  Freedom of Choice: Yes  Comments - Freedom of Choice: Met w pt/family at bedside; introduced self and explained role of CM, including arranging DME, STR, home health, out pt tx. Advised pt/family that CM will make appropriate referrals based on treatment team recommendations and MD orders, and they can consider recommended plan of care and choose from available vendors.  Spouse says that pt goes to [facility] for outpt ST and PT, and she wants to continue w same.  CM contacted family/caregiver?: Yes  Were Treatment Team discharge recommendations reviewed with patient/caregiver?:  (none at present)          Contacts  Patient  Contacts: MICHAEL MORALES (Spouse)  [phone] (Mobile);  USE HOME NUMBER [phone]    Requested Home Health Care         Is the patient interested in HHC at discharge?: No    DME Referral Provided  Referral made for DME?: No    Other Referral/Resources/Interventions Provided:  Referral Comments: Spouse says that pt goes to [facility] for outpt ST and PT, and she wants to continue w same.  Pt w Parkinson's Dz;  spouse very knowledgable and proactive on pt's behalf.  Pt/spouse have family in area that are a good support, per spouse.         Treatment Team Recommendation: Home  Discharge Destination Plan:: Outpatient Rehab  Transport at Discharge : Family

## 2024-12-16 NOTE — DISCHARGE SUMMARY
"  Discharge Date: Discharge Summary - Momo Swann 89 y.o. male MRN: [id number]    Unit/Bed#: -01 Encounter: [id number]    Admission Date: 12/14/2024   Discharge Date: 12/16/24    Admitting Diagnosis:   Mixed hyperlipidemia [E78.2]  Restless leg syndrome [G25.81]  Essential hypertension [I10]  Flank pain [R10.9]  Diverticulitis of large intestine with abscess [K57.20]  Parkinson's disease, unspecified whether dyskinesia present, unspecified whether manifestations fluctuate (HCC) [G20.A1]    Principle/ Secondary Diagnosis:  Past Medical History:   Diagnosis Date    Arthritis     Bladder stones     Hypertension     Parkinson disease (HCC)      Past Surgical History:   Procedure Laterality Date    COLONOSCOPY      FOOT SURGERY      VT CYSTOLITHOTOMY CYSTOTOMY W/RMVL CALCULUS N/A 8/5/2021    Procedure: CYSTOLITHOTOMY OPEN;  Surgeon: Adiel Niño MD;  Location: [facility];  Service: Urology    VT TRURL ELECTROSURG RESCJ PROSTATE BLEED COMPLETE N/A 8/5/2021    Procedure: TRANSURETHRAL RESECTION OF PROSTATE (TURP);  Surgeon: Adiel Niño MD;  Location: [facility];  Service: Urology    TONSILLECTOMY         Discharge Diagnoses:   Principal Problem:    Diverticulitis of intestine with abscess  Active Problems:    Parkinson disease (HCC)    Essential hypertension    GERD (gastroesophageal reflux disease)      Procedures Performed:  No orders of the defined types were placed in this encounter.    Procedure name not found.      Consultants:       HPI: As per Adiel Nunez DO: \" Momo Swann is a 89 y.o. male who presents with abdominal pain.  Patient notes abdominal pain for about 2 days.  He has never had this time of pain before.  He denies ever having diverticulitis.  He notes the pain is on the left lower side of his abdomen. Diverticulitis of intestine with abscess  -Patient presents with 2 days of lower abdominal pain  -He woke up this morning and the pain had increased so he presented to the emergency " "department  -Denies ever having diverticulitis before  -Abdomen soft, nondistended, tenderness to palpation in the left lower quadrant  -CT scan of the abdomen and pelvis report and images reviewed  -White blood cell count noted to be 10.3 with 81% shift  -N.p.o., IV fluid, IV antibiotics  -Nonoperative management at this time  -SLIM consult for medical management \"    Summary of Hospital Course: The patient was admitted to the hospital and started on conservative therapy with NPO/IVF and IV ceftriaxone/flagyl. Over the course of the hospital period, diet was advanced in concordance with improvement of abdominal symptoms and return of bowel function. Leukocytosis normalized during this time. Diet was advanced to solid low fiber low residue diet on HOD3 which patient tolerated without nausea or vomiting. That day, the patient was noted to be doing well, tolerating solid diet without abdominal pain, nausea, or vomiting. Labs were within normal limits, and patient was afebrile. The patient was deemed appropriate for discharge home with instructions to follow up in the office in two weeks. The patient was given a prescription to complete a total of two week course of antibiotics and was instructed to remain on a low-fiber low-residue diet for another two weeks before transitioning to a high fiber diet. All questions and concerns were answered prior to discharge.       Significant Findings, Care, Treatment and Services Provided: See Above  CT abdomen pelvis with contrast  Result Date: 12/14/2024  Impression: Findings consistent with acute diverticulitis at the junction of the distal descending and sigmoid colon complicated by a small 1.7 x 1.1 cm intramural abscess. No evidence for macroperforation. Cholelithiasis; no pericholecystic inflammation. Mild hepatomegaly/hepatic steatosis. Spleen similarly top normal in size. The study was marked in EPIC for immediate notification. Workstation performed: [id number] "       Complications: None    Discharge Diagnosis: Acute diverticulitis with abscess    Medical Problems       Resolved Problems  Date Reviewed: 12/15/2024   None       Principal Problem:    Diverticulitis of intestine with abscess  Active Problems:    Parkinson disease (HCC)    Essential hypertension    GERD (gastroesophageal reflux disease)      Condition at Discharge: good         Discharge instructions/Information to patient and family:   See after visit summary for information provided to patient and family.      Provisions for Follow-Up Care:  See after visit summary for information related to follow-up care and any pertinent home health orders.      PCP: Iain Martell DO    Disposition: See After Visit Summary for discharge disposition information.    Planned Readmission: No    Discharge Statement   I spent 30 minutes discharging the patient. This time was spent on the day of discharge. I had direct contact with the patient on the day of discharge. Additional documentation is required if more than 30 minutes were spent on discharge.     Discharge Medications:  See after visit summary for reconciled discharge medications provided to patient and family.

## 2024-12-16 NOTE — PROGRESS NOTES
Progress Note - Surgery-General   Name: Momo Swann 89 y.o. male I MRN: [id number]  Unit/Bed#: -01 I Date of Admission: 12/14/2024   Date of Service: 12/16/2024 I Hospital Day: 2     Assessment & Plan  Diverticulitis of intestine with abscess  89-year-old male with diverticulitis with intramural abscess   AVSS, AM CBC pending  Cr 0.88  + flatus, abdomen soft, nondistended, mild LLQ tenderness to palpation, no guarding or rebound.     Continue current care, advance diet as tolerated to soft, low residue diet. Informed patient to be judicious with intake. Will continue advancing as tolerated.  IV ceftriaxone/flagyl for antimicrobial coverage   Trend labs, monitor Hb, WBC, and vitals   I/Os  Serial abdominal exams  Encourage ambulation/OOB  Pain control/Antiemetics PRN  IS 10x an hour  DVT prophylaxis   If patient tolerates diet advancement, anticipate discharge home later today with instructions to follow up with General Surgery in two weeks. The patient will be transitioned to Augmentin to complete the antimicrobial course.         Subjective/Objective    Subjective: No acute events overnight     Objective:     Blood pressure 138/81, pulse 95, temperature 97.6 °F (36.4 °C), resp. rate 18, SpO2 95%.,There is no height or weight on file to calculate BMI.      Intake/Output Summary (Last 24 hours) at 12/16/2024 1010  Last data filed at 12/16/2024 0201  Gross per 24 hour   Intake --   Output 550 ml   Net -550 ml       Invasive Devices       Peripheral Intravenous Line  Duration             Peripheral IV 12/14/24 Right Antecubital 2 days                    Physical Exam: /81 (BP Location: Right arm)   Pulse 95   Temp 97.6 °F (36.4 °C)   Resp 18   SpO2 95%   General appearance: alert and oriented, in no acute distress  Lungs: clear to auscultation bilaterally  Heart: regular rate and rhythm, S1, S2 normal, no murmur, click, rub or gallop  Abdomen: soft, non-tender; bowel sounds normal; no masses,  no  organomegaly  Extremities: extremities normal, warm and well-perfused; no cyanosis, clubbing, or edema    Lab, Imaging and other studies:I have personally reviewed pertinent lab results.     VTE Pharmacologic Prophylaxis: VTE covered by:  heparin (porcine), Subcutaneous, 5,000 Units at 12/16/24 0711     VTE Mechanical Prophylaxis: sequential compression device

## 2024-12-16 NOTE — NURSING NOTE
Patient ran out of Rytary (Levodopa) dose of 3 pills. Pharmacy was able to approve a one time dose of 2 pills for him to have his 0500 dosage.

## 2024-12-16 NOTE — DISCHARGE INSTR - AVS FIRST PAGE
Please call the office after leaving the hospital to schedule a follow up appointment.     Remain on a low fiber diet for 2 weeks. You will be transitioned to a high fiber diet during your follow up appointment

## 2024-12-16 NOTE — ASSESSMENT & PLAN NOTE
89-year-old male with diverticulitis with intramural abscess   AVSS, AM CBC pending  Cr 0.88  + flatus, abdomen soft, nondistended, mild LLQ tenderness to palpation, no guarding or rebound.     Continue current care, advance diet as tolerated to soft, low residue diet. Informed patient to be judicious with intake. Will continue advancing as tolerated.  IV ceftriaxone/flagyl for antimicrobial coverage   Trend labs, monitor Hb, WBC, and vitals   I/Os  Serial abdominal exams  Encourage ambulation/OOB  Pain control/Antiemetics PRN  IS 10x an hour  DVT prophylaxis   If patient tolerates diet advancement, anticipate discharge home later today with instructions to follow up with General Surgery in two weeks. The patient will be transitioned to Augmentin to complete the antimicrobial course.

## 2024-12-16 NOTE — UTILIZATION REVIEW
Continued Stay Review    Date: 12/16                          Current Patient Class: Inpatient  Current Level of Care: MS    HPI:89 y.o. male initially admitted on 12/14 for acute diverticulitis     Assessment/Plan:     Date: 12/16  Day 3: Has surpassed a 2nd midnight with active treatments and services.  Adv diet , if able to tolerate plan to DC to home later today . F/u surgery as OP . Transitioned to Augmentin to complete course         Medications:   Scheduled Medications:  Carbidopa-Levodopa ER, 3 capsule, Oral, TID  cefTRIAXone, 2,000 mg, Intravenous, Q24H  famotidine, 40 mg, Oral, HS  heparin (porcine), 5,000 Units, Subcutaneous, Q8H LOYDA  losartan, 25 mg, Oral, Daily  metroNIDAZOLE, 500 mg, Intravenous, Q8H      Continuous IV Infusions:  sodium chloride, 75 mL/hr, Intravenous, Continuous      PRN Meds:  acetaminophen, 650 mg, Oral, Q6H PRN  morphine injection, 2 mg, Intravenous, Q3H PRN  oxyCODONE, 5 mg, Oral, Q6H PRN  traMADol, 50 mg, Oral, Q6H PRN      Discharge Plan: TBD     Vital Signs (last 3 days)       Date/Time Temp Pulse Resp BP MAP (mmHg) SpO2 O2 Device Patient Position - Orthostatic VS [place] Coma Scale Score Pain    12/16/24 0100 -- -- -- -- -- -- -- -- -- 2    12/15/24 1900 -- -- -- -- -- -- -- -- 15 No Pain    12/15/24 1500 97.6 °F (36.4 °C) 95 18 138/81 100 -- -- -- -- --    12/15/24 0915 -- -- -- -- -- 95 % None (Room air) -- 15 No Pain    12/15/24 0900 98 °F (36.7 °C) 73 18 152/78 107 -- -- Sitting -- --    12/14/24 2100 -- 85 -- 120/71 90 92 % None (Room air) Lying -- --    12/14/24 1930 -- -- -- -- -- -- -- -- 15 No Pain    12/14/24 1500 -- 85 -- 141/75 101 -- -- Lying -- --    12/14/24 1300 -- -- -- -- -- -- -- -- 15 --    12/14/24 1207 98.3 °F (36.8 °C) 81 17 141/74 101 91 % -- Sitting -- No Pain    12/14/24 0806 -- -- -- -- -- -- -- -- 15 --    12/14/24 0758 97.7 °F (36.5 °C) -- -- -- -- -- -- -- -- --    12/14/24 0756 -- 81 16 161/82 -- 92 % -- -- -- --          Weight (last 2 days)        None            Pertinent Labs/Diagnostic Results:   Radiology:  CT abdomen pelvis with contrast   Final Interpretation by Jb Sky MD (12/14 0948)      Findings consistent with acute diverticulitis at the junction of the distal descending and sigmoid colon complicated by a small 1.7 x 1.1 cm intramural abscess. No evidence for macroperforation.      Cholelithiasis; no pericholecystic inflammation.      Mild hepatomegaly/hepatic steatosis.      Spleen similarly top normal in size.      The study was marked in EPIC for immediate notification.               Workstation performed: [id number]           Cardiology:  ECG 12 lead   Final Result by Charles Monge MD (12/14 6887)   Sinus rhythm with Premature supraventricular complexes   Right bundle branch block   Abnormal ECG   When compared with ECG of 09-Oct-2024 18:39,   Premature supraventricular complexes are now Present   Confirmed by Charles Monge ([id number]) on 12/14/2024 1:34:09 PM        GI:  No orders to display           Results from last 7 days   Lab Units 12/15/24  0521 12/14/24  0826   WBC Thousand/uL 4.80 10.39*   HEMOGLOBIN g/dL 13.1 14.8   HEMATOCRIT % 38.8 44.6   PLATELETS Thousands/uL 159 182   TOTAL NEUT ABS Thousands/µL 3.43 8.52*         Results from last 7 days   Lab Units 12/16/24  0647 12/15/24  0521 12/14/24  0826   SODIUM mmol/L 138 139 139   POTASSIUM mmol/L 3.9 4.1 4.3   CHLORIDE mmol/L 105 106 103   CO2 mmol/L 25 28 30   ANION GAP mmol/L 8 5 6   BUN mg/dL 17 17 19   CREATININE mg/dL 0.88 0.90 0.87   EGFR ml/min/1.73sq m 77 76 77   CALCIUM mg/dL 8.7 8.5 9.4   MAGNESIUM mg/dL  --  1.8*  --    PHOSPHORUS mg/dL  --  2.8  --      Results from last 7 days   Lab Units 12/15/24  0521 12/14/24  0826   AST U/L 13 32   ALT U/L 3* 5*   ALK PHOS U/L 48 53   TOTAL PROTEIN g/dL 6.0* 7.6   ALBUMIN g/dL 3.8 5.0   TOTAL BILIRUBIN mg/dL 1.05* 1.14*       Results from last 7 days   Lab Units 12/16/24  0647 12/15/24  0521 12/14/24  0826   GLUCOSE  RANDOM mg/dL 88 94 99       Results from last 7 days   Lab Units 12/14/24  1040 12/14/24  0826   HS TNI 0HR ng/L  --  6   HS TNI 2HR ng/L 5  --    HSTNI D2 ng/L -1  --        Results from last 7 days   Lab Units 12/14/24  0845   LACTIC ACID mmol/L 1.2     Results from last 7 days   Lab Units 12/14/24  0826   LIPASE u/L 22     Results from last 7 days   Lab Units 12/14/24  0950   CLARITY UA  Clear   COLOR UA  Light Yellow   SPEC GRAV UA  >=1.050*   PH UA  5.5   GLUCOSE UA mg/dl Negative   KETONES UA mg/dl Trace*   BLOOD UA  Negative   PROTEIN UA mg/dl Trace*   NITRITE UA  Negative   BILIRUBIN UA  Negative   UROBILINOGEN UA (BE) mg/dl <2.0   LEUKOCYTES UA  Negative   WBC UA /hpf 1-2   RBC UA /hpf None Seen   BACTERIA UA /hpf None Seen   EPITHELIAL CELLS WET PREP /hpf Occasional   MUCUS THREADS  Occasional*         Network Utilization Review Department  ATTENTION: Please call with any questions or concerns to [phone] and carefully listen to the prompts so that you are directed to the right person. All voicemails are confidential.   For Discharge needs, contact Care Management DC Support Team at [phone] opt. [phone]  Send all requests for admission clinical reviews, approved or denied determinations and any other requests to dedicated fax number below belonging to the campus where the patient is receiving treatment. List of dedicated fax numbers for the Facilities:  FACILITY NAME UR FAX NUMBER   ADMISSION DENIALS (Administrative/Medical Necessity) [phone]   DISCHARGE SUPPORT TEAM (NETWORK) [phone]   PARENT CHILD HEALTH (Maternity/NICU/Pediatrics) [phone]   [facility] [phone]   [facility] [phone]   [facility] [phone]   [facility] [phone]   [facility] [phone]   [facility] [phone]   [facility]  [phone]   [facility] [phone]   [facility] [phone]   [facility] [phone]   [facility] [phone]   [facility] [phone]

## 2024-12-23 ENCOUNTER — TELEPHONE (OUTPATIENT)
Age: 89
End: 2024-12-23

## 2024-12-23 NOTE — TELEPHONE ENCOUNTER
Spoke with pt's wife and explained to her that he needs to follow up in office to makes sure he is doing much better. She states right now he is; I said I understand but he need to f/u with Dr. Nunez and talk with him in regards to what he can do if he has a flare up and diet options.she verbalized understanding and is scheduled for 1/6/25

## 2024-12-23 NOTE — TELEPHONE ENCOUNTER
Pt's spouse called in regarding AVS instructions. Pt is supposed to schedule f/u with Dr. Nunez. Spouse said pt is doing good. She states does not want to drive to [place] unless it is really necessary. She was told we would ask the Dr and then will give her a call back.

## (undated) DEVICE — EVACUATOR BLADDER ELLIK DISP STRL

## (undated) DEVICE — SURGICEL 4 X 8

## (undated) DEVICE — SYRINGE 20ML LL

## (undated) DEVICE — SUT PDS II 1 CT-1 27 IN Z347H

## (undated) DEVICE — UROCATCH BAG

## (undated) DEVICE — SUT VICRYL 3-0 SH 27 IN J416H

## (undated) DEVICE — CATH FOLEY 20FR 5ML 2 WAY SILICONE ELASTIMER

## (undated) DEVICE — PROXIMATE SKIN STAPLERS (35 WIDE) CONTAINS 35 STAINLESS STEEL STAPLES (FIXED HEAD): Brand: PROXIMATE

## (undated) DEVICE — TOWEL SURG XR DETECT GREEN STRL RFD

## (undated) DEVICE — GLOVE INDICATOR PI UNDERGLOVE SZ 8 BLUE

## (undated) DEVICE — BOWL: 16OZ PEELPOUCH 75/CS 16/PLT: Brand: MEDEGEN MEDICAL PRODUCTS, LLC

## (undated) DEVICE — CATH SECURE FOLEY

## (undated) DEVICE — CHLORHEXIDINE 4PCT 4 OZ

## (undated) DEVICE — GAUZE SPONGES,16 PLY: Brand: CURITY

## (undated) DEVICE — SPONGE 4 X 4 XRAY 16 PLY STRL LF RFD

## (undated) DEVICE — ELECTRODE BLADE MOD  E-Z CLEAN 6.5IN -0014M

## (undated) DEVICE — ELECTRODE EZ CLEAN BLADE -0012

## (undated) DEVICE — CATH FOLEY COUDE HEMATURIA 24FR 30ML 3 WAY LUBRICATH

## (undated) DEVICE — DRESSING MEPILEX BORDER 4 X 10 IN

## (undated) DEVICE — GLOVE SRG BIOGEL ECLIPSE 7.5

## (undated) DEVICE — INVIEW CLEAR LEGGINGS: Brand: CONVERTORS

## (undated) DEVICE — BAG URINE DRAINAGE 4000ML CONTINUOUS IRR

## (undated) DEVICE — MEDI-VAC YANK SUCT HNDL W/TPRD BULBOUS TIP: Brand: CARDINAL HEALTH

## (undated) DEVICE — HF-RESECTION ELECTRODE PLASMABUTTON BUTTON, 24 FR., 12°-30°, ESG TURIS: Brand: OLYMPUS

## (undated) DEVICE — PACK TUR

## (undated) DEVICE — SUT VICRYL 2-0 SH 27 IN UNDYED J417H

## (undated) DEVICE — CHLORAPREP HI-LITE 26ML ORANGE

## (undated) DEVICE — SCD SEQUENTIAL COMPRESSION COMFORT SLEEVE MEDIUM KNEE LENGTH: Brand: KENDALL SCD

## (undated) DEVICE — INTENDED FOR TISSUE SEPARATION, AND OTHER PROCEDURES THAT REQUIRE A SHARP SURGICAL BLADE TO PUNCTURE OR CUT.: Brand: BARD-PARKER SAFETY BLADES SIZE 10, STERILE

## (undated) DEVICE — SYRINGE 10ML LL

## (undated) DEVICE — INTENDED FOR TISSUE SEPARATION, AND OTHER PROCEDURES THAT REQUIRE A SHARP SURGICAL BLADE TO PUNCTURE OR CUT.: Brand: BARD-PARKER SAFETY BLADES SIZE 15, STERILE

## (undated) DEVICE — SYRINGE CATH TIP 50ML

## (undated) DEVICE — PAD GROUNDING ADULT

## (undated) DEVICE — BETHLEHEM MAJOR GENERAL PACK: Brand: CARDINAL HEALTH

## (undated) DEVICE — CYSTO TUBING TUR Y IRRIGATION

## (undated) DEVICE — HF-RESECTION ELECTRODE PLASMALOOP LOOP, MEDIUM, 24 FR., 12°-30°, ESG TURIS: Brand: OLYMPUS

## (undated) DEVICE — ADHESIVE SKIN HIGH VISCOSITY EXOFIN 1ML